# Patient Record
Sex: MALE | Race: WHITE | HISPANIC OR LATINO | Employment: OTHER | URBAN - METROPOLITAN AREA
[De-identification: names, ages, dates, MRNs, and addresses within clinical notes are randomized per-mention and may not be internally consistent; named-entity substitution may affect disease eponyms.]

---

## 2023-02-23 LAB
ANION GAP IN SER/PLAS: 12 MMOL/L (ref 10–20)
CALCIUM (MG/DL) IN SER/PLAS: 9.6 MG/DL (ref 8.6–10.3)
CARBON DIOXIDE, TOTAL (MMOL/L) IN SER/PLAS: 26 MMOL/L (ref 21–32)
CHLORIDE (MMOL/L) IN SER/PLAS: 105 MMOL/L (ref 98–107)
CREATININE (MG/DL) IN SER/PLAS: 1.44 MG/DL (ref 0.5–1.3)
GFR MALE: 47 ML/MIN/1.73M2
GLUCOSE (MG/DL) IN SER/PLAS: 86 MG/DL (ref 74–99)
INR IN PPP BY COAGULATION ASSAY: 1.9 (ref 0.9–1.1)
PARATHYRIN INTACT (PG/ML) IN SER/PLAS: 106 PG/ML (ref 18.5–88)
PHOSPHATE (MG/DL) IN SER/PLAS: 3 MG/DL (ref 2.5–4.9)
POTASSIUM (MMOL/L) IN SER/PLAS: 4.3 MMOL/L (ref 3.5–5.3)
PROTHROMBIN TIME (PT) IN PPP BY COAGULATION ASSAY: 22.1 SEC (ref 9.8–13.4)
SODIUM (MMOL/L) IN SER/PLAS: 139 MMOL/L (ref 136–145)
URATE (MG/DL) IN SER/PLAS: 4.9 MG/DL (ref 4–7.5)
UREA NITROGEN (MG/DL) IN SER/PLAS: 22 MG/DL (ref 6–23)

## 2023-03-28 LAB
INR IN PPP BY COAGULATION ASSAY: 1.7 (ref 0.9–1.1)
PROTHROMBIN TIME (PT) IN PPP BY COAGULATION ASSAY: 19.8 SEC (ref 9.8–13.4)

## 2023-04-26 LAB
INR IN PPP BY COAGULATION ASSAY: 2.2 (ref 0.9–1.1)
PROTHROMBIN TIME (PT) IN PPP BY COAGULATION ASSAY: 25.3 SEC (ref 9.8–13.4)

## 2023-05-25 LAB
INR IN PPP BY COAGULATION ASSAY: 2.3 (ref 0.9–1.1)
PROTHROMBIN TIME (PT) IN PPP BY COAGULATION ASSAY: 27.3 SEC (ref 9.8–13.4)

## 2023-06-21 LAB
INR IN PPP BY COAGULATION ASSAY: 1.9 (ref 0.9–1.1)
PROTHROMBIN TIME (PT) IN PPP BY COAGULATION ASSAY: 21.5 SEC (ref 9.8–12.8)

## 2023-07-19 LAB
INR IN PPP BY COAGULATION ASSAY: 2.3 (ref 0.9–1.1)
PROTHROMBIN TIME (PT) IN PPP BY COAGULATION ASSAY: 25.8 SEC (ref 9.8–12.8)

## 2023-08-16 LAB
ANION GAP IN SER/PLAS: 11 MMOL/L (ref 10–20)
CALCIUM (MG/DL) IN SER/PLAS: 9.4 MG/DL (ref 8.6–10.3)
CARBON DIOXIDE, TOTAL (MMOL/L) IN SER/PLAS: 27 MMOL/L (ref 21–32)
CHLORIDE (MMOL/L) IN SER/PLAS: 103 MMOL/L (ref 98–107)
CREATININE (MG/DL) IN SER/PLAS: 1.59 MG/DL (ref 0.5–1.3)
GFR MALE: 41 ML/MIN/1.73M2
GLUCOSE (MG/DL) IN SER/PLAS: 105 MG/DL (ref 74–99)
INR IN PPP BY COAGULATION ASSAY: 3.1 (ref 0.9–1.1)
PARATHYRIN INTACT (PG/ML) IN SER/PLAS: 70.7 PG/ML (ref 18.5–88)
PHOSPHATE (MG/DL) IN SER/PLAS: 3.2 MG/DL (ref 2.5–4.9)
POTASSIUM (MMOL/L) IN SER/PLAS: 4.4 MMOL/L (ref 3.5–5.3)
PROTHROMBIN TIME (PT) IN PPP BY COAGULATION ASSAY: 34.9 SEC (ref 9.8–12.8)
SODIUM (MMOL/L) IN SER/PLAS: 137 MMOL/L (ref 136–145)
UREA NITROGEN (MG/DL) IN SER/PLAS: 23 MG/DL (ref 6–23)

## 2023-09-13 LAB
INR IN PPP BY COAGULATION ASSAY: 1.8 (ref 0.9–1.1)
PROTHROMBIN TIME (PT) IN PPP BY COAGULATION ASSAY: 19.9 SEC (ref 9.8–12.8)

## 2023-10-11 ENCOUNTER — LAB (OUTPATIENT)
Dept: LAB | Facility: LAB | Age: 88
End: 2023-10-11
Payer: MEDICARE

## 2023-10-11 DIAGNOSIS — Z79.01 LONG TERM (CURRENT) USE OF ANTICOAGULANTS: Primary | ICD-10-CM

## 2023-10-11 LAB
INR PPP: 2.8 (ref 0.9–1.1)
PROTHROMBIN TIME: 31.7 SECONDS (ref 9.8–12.8)

## 2023-10-11 PROCEDURE — 85610 PROTHROMBIN TIME: CPT

## 2023-10-11 PROCEDURE — 36415 COLL VENOUS BLD VENIPUNCTURE: CPT

## 2023-11-08 ENCOUNTER — LAB (OUTPATIENT)
Dept: LAB | Facility: LAB | Age: 88
End: 2023-11-08
Payer: MEDICARE

## 2023-11-08 DIAGNOSIS — Z79.01 LONG TERM (CURRENT) USE OF ANTICOAGULANTS: Primary | ICD-10-CM

## 2023-11-08 LAB
INR PPP: 2.9 (ref 0.9–1.1)
PROTHROMBIN TIME: 33.4 SECONDS (ref 9.8–12.8)

## 2023-11-08 PROCEDURE — 85610 PROTHROMBIN TIME: CPT

## 2023-11-08 PROCEDURE — 36415 COLL VENOUS BLD VENIPUNCTURE: CPT

## 2023-11-22 DIAGNOSIS — N18.9 CHRONIC KIDNEY DISEASE, UNSPECIFIED CKD STAGE: ICD-10-CM

## 2023-11-28 RX ORDER — ALLOPURINOL 100 MG/1
100 TABLET ORAL DAILY
Qty: 90 TABLET | Refills: 2 | Status: SHIPPED | OUTPATIENT
Start: 2023-11-28

## 2023-12-06 ENCOUNTER — LAB (OUTPATIENT)
Dept: LAB | Facility: LAB | Age: 88
End: 2023-12-06
Payer: MEDICARE

## 2023-12-06 DIAGNOSIS — Z79.01 LONG TERM (CURRENT) USE OF ANTICOAGULANTS: Primary | ICD-10-CM

## 2023-12-06 LAB
INR PPP: 3.4 (ref 0.9–1.1)
PROTHROMBIN TIME: 39.4 SECONDS (ref 9.8–12.8)

## 2023-12-06 PROCEDURE — 36415 COLL VENOUS BLD VENIPUNCTURE: CPT

## 2023-12-06 PROCEDURE — 85610 PROTHROMBIN TIME: CPT

## 2023-12-11 ENCOUNTER — APPOINTMENT (OUTPATIENT)
Dept: PRIMARY CARE | Facility: CLINIC | Age: 88
End: 2023-12-11
Payer: MEDICARE

## 2023-12-28 ENCOUNTER — LAB (OUTPATIENT)
Dept: LAB | Facility: LAB | Age: 88
End: 2023-12-28
Payer: MEDICARE

## 2023-12-28 DIAGNOSIS — Z79.01 LONG TERM (CURRENT) USE OF ANTICOAGULANTS: Primary | ICD-10-CM

## 2023-12-28 LAB
INR PPP: 2.6 (ref 0.9–1.1)
PROTHROMBIN TIME: 29.1 SECONDS (ref 9.8–12.8)

## 2023-12-28 PROCEDURE — 36415 COLL VENOUS BLD VENIPUNCTURE: CPT

## 2023-12-28 PROCEDURE — 85610 PROTHROMBIN TIME: CPT

## 2024-01-25 ENCOUNTER — LAB (OUTPATIENT)
Dept: LAB | Facility: LAB | Age: 89
End: 2024-01-25
Payer: MEDICARE

## 2024-01-25 DIAGNOSIS — Z79.01 LONG TERM (CURRENT) USE OF ANTICOAGULANTS: Primary | ICD-10-CM

## 2024-01-25 LAB
INR PPP: 3.2 (ref 0.9–1.1)
PROTHROMBIN TIME: 36.1 SECONDS (ref 9.8–12.8)

## 2024-01-25 PROCEDURE — 36415 COLL VENOUS BLD VENIPUNCTURE: CPT

## 2024-01-25 PROCEDURE — 85610 PROTHROMBIN TIME: CPT

## 2024-02-22 ENCOUNTER — LAB (OUTPATIENT)
Dept: LAB | Facility: LAB | Age: 89
End: 2024-02-22
Payer: MEDICARE

## 2024-02-22 DIAGNOSIS — Z79.01 LONG TERM (CURRENT) USE OF ANTICOAGULANTS: Primary | ICD-10-CM

## 2024-02-22 DIAGNOSIS — I10 ESSENTIAL (PRIMARY) HYPERTENSION: ICD-10-CM

## 2024-02-22 DIAGNOSIS — N25.81 SECONDARY HYPERPARATHYROIDISM OF RENAL ORIGIN (MULTI): ICD-10-CM

## 2024-02-22 LAB
ANION GAP SERPL CALC-SCNC: 11 MMOL/L (ref 10–20)
BUN SERPL-MCNC: 24 MG/DL (ref 6–23)
CALCIUM SERPL-MCNC: 9.8 MG/DL (ref 8.6–10.3)
CHLORIDE SERPL-SCNC: 104 MMOL/L (ref 98–107)
CO2 SERPL-SCNC: 29 MMOL/L (ref 21–32)
CREAT SERPL-MCNC: 1.6 MG/DL (ref 0.5–1.3)
EGFRCR SERPLBLD CKD-EPI 2021: 41 ML/MIN/1.73M*2
GLUCOSE SERPL-MCNC: 89 MG/DL (ref 74–99)
INR PPP: 2.6 (ref 0.9–1.1)
POTASSIUM SERPL-SCNC: 4 MMOL/L (ref 3.5–5.3)
PROTHROMBIN TIME: 29.5 SECONDS (ref 9.8–12.8)
PTH-INTACT SERPL-MCNC: 51.3 PG/ML (ref 18.5–88)
SODIUM SERPL-SCNC: 140 MMOL/L (ref 136–145)

## 2024-02-22 PROCEDURE — 36415 COLL VENOUS BLD VENIPUNCTURE: CPT

## 2024-02-22 PROCEDURE — 85610 PROTHROMBIN TIME: CPT

## 2024-02-22 PROCEDURE — 83970 ASSAY OF PARATHORMONE: CPT

## 2024-02-22 PROCEDURE — 80048 BASIC METABOLIC PNL TOTAL CA: CPT

## 2024-02-26 ENCOUNTER — OFFICE VISIT (OUTPATIENT)
Dept: PRIMARY CARE | Facility: CLINIC | Age: 89
End: 2024-02-26
Payer: MEDICARE

## 2024-02-26 VITALS
DIASTOLIC BLOOD PRESSURE: 70 MMHG | HEIGHT: 70 IN | OXYGEN SATURATION: 96 % | BODY MASS INDEX: 21.33 KG/M2 | TEMPERATURE: 98 F | HEART RATE: 77 BPM | WEIGHT: 149 LBS | SYSTOLIC BLOOD PRESSURE: 110 MMHG

## 2024-02-26 DIAGNOSIS — E44.0 MODERATE PROTEIN-CALORIE MALNUTRITION (MULTI): ICD-10-CM

## 2024-02-26 DIAGNOSIS — N18.32 STAGE 3B CHRONIC KIDNEY DISEASE (MULTI): ICD-10-CM

## 2024-02-26 DIAGNOSIS — F03.90 DEMENTIA WITHOUT BEHAVIORAL DISTURBANCE (MULTI): ICD-10-CM

## 2024-02-26 DIAGNOSIS — Z00.00 MEDICARE ANNUAL WELLNESS VISIT, SUBSEQUENT: Primary | ICD-10-CM

## 2024-02-26 DIAGNOSIS — M1A.0790 CHRONIC IDIOPATHIC GOUT INVOLVING TOE WITHOUT TOPHUS, UNSPECIFIED LATERALITY: ICD-10-CM

## 2024-02-26 DIAGNOSIS — N25.81 SECONDARY HYPERPARATHYROIDISM OF RENAL ORIGIN (MULTI): ICD-10-CM

## 2024-02-26 PROCEDURE — 99497 ADVNCD CARE PLAN 30 MIN: CPT | Performed by: FAMILY MEDICINE

## 2024-02-26 PROCEDURE — 1036F TOBACCO NON-USER: CPT | Performed by: FAMILY MEDICINE

## 2024-02-26 PROCEDURE — G0439 PPPS, SUBSEQ VISIT: HCPCS | Performed by: FAMILY MEDICINE

## 2024-02-26 PROCEDURE — 1159F MED LIST DOCD IN RCRD: CPT | Performed by: FAMILY MEDICINE

## 2024-02-26 RX ORDER — CALCITRIOL 0.25 UG/1
0.25 CAPSULE ORAL
COMMUNITY
Start: 2017-11-04

## 2024-02-26 RX ORDER — METOPROLOL SUCCINATE 25 MG/1
TABLET, EXTENDED RELEASE ORAL
COMMUNITY

## 2024-02-26 RX ORDER — DONEPEZIL HYDROCHLORIDE 10 MG/1
10 TABLET, FILM COATED ORAL NIGHTLY
COMMUNITY
Start: 2020-09-18

## 2024-02-26 RX ORDER — MEMANTINE HYDROCHLORIDE 5 MG/1
5 TABLET ORAL 2 TIMES DAILY
COMMUNITY
End: 2024-02-26 | Stop reason: WASHOUT

## 2024-02-26 RX ORDER — LISINOPRIL 10 MG/1
TABLET ORAL
COMMUNITY
End: 2024-02-26 | Stop reason: WASHOUT

## 2024-02-26 RX ORDER — WARFARIN SODIUM 5 MG/1
5 TABLET ORAL
COMMUNITY
Start: 2024-01-31

## 2024-02-26 ASSESSMENT — PATIENT HEALTH QUESTIONNAIRE - PHQ9
2. FEELING DOWN, DEPRESSED OR HOPELESS: NOT AT ALL
SUM OF ALL RESPONSES TO PHQ9 QUESTIONS 1 AND 2: 0
1. LITTLE INTEREST OR PLEASURE IN DOING THINGS: NOT AT ALL

## 2024-02-26 NOTE — PROGRESS NOTES
Advance Care Planning Note     Discussion Date: 02/26/24   Discussion Participants: patient    The patient wishes to discuss Advance Care Planning today and the following is a brief summary of our discussion.     Patient has capacity to make their own medical decisions: Yes  Health Care Agent/Surrogate Decision Maker documented in chart: Yes    Documents on file and valid:  Advance Directive/Living Will: Yes   Health Care Power of : Yes  Other: [email or web address]    Communication of Medical Status/Prognosis:   na    Communication of Treatment Goals/Options:   yes     Treatment Decisions  Goals of Care: survival is paramount regardless of prognosis, treatment outcome, or burden   na  Follow Up Plan  na  Team Members  na  Time Statement: Total face to face time spent on advance care planning was >15 minutes with 16 minutes spent in counseling, including the explanation.    Marko Mcfarland, DO  2/26/2024 2:57 PM    Patient is here today with no issues problems or complaints is already had blood work and doing well from follow-up for his kidney specialist.  He has had a little bit of issues hearing    REVIEW OF SYSTEMS: 12 systems negative except for those mentioned in the HPI. Reviewed home risks with patient. Patient feels safe at home.    PHYSICAL EXAMINATION:   Constitutional: The patient is alert and oriented x3, in no acute  distress.  Eyes: Extraocular movements are intact. Pupils are equal and reactive to light  ENT: Bilateral ears are occluded with cerumen.  With patient's permission both are flushed clean patient is able to hear better with no complications. Nasal turbinates are within normal limits. Throat within normal limits.  Neck: Supple without lymphadenopathy or JVD.  Heart: Regular rate and rhythm without murmur, click, gallop, or rub.  Lungs: Clear to auscultation bilaterally. No rales, rhonchi, or  wheezing.  Abdomen: Soft, nontender, nondistended. Normoactive bowel sounds.   No palpable masses. Normal  percussion  Musculoskeletal: 5/5 motor, upper and lower extremities.  Neurologic: Cranial nerves II through XII fully intact. +2/4 DTRs  in ankle and knee.  Psychiatric: Good judgment and insight. Normal affect and mood. No cognitive defects noted.  Lymphatic: Negative as noted above.  Skin: no rashes or lesions    Assessment:  Per EMR    Plan:  Patient is doing excellent.  No issues problems or complaints.  Labs are reviewed otherwise medications to be refilled we will follow-up 6 months  Discussed with the patient and reviewed annual wellness questionnaire form as well as cognitive deficits. Also discussed with the patient immunizations and risks for colonoscopy and other screening procedures    This dictation was created using Dragon dictation and may contain errors

## 2024-03-12 ENCOUNTER — OFFICE VISIT (OUTPATIENT)
Dept: NEPHROLOGY | Facility: CLINIC | Age: 89
End: 2024-03-12
Payer: MEDICARE

## 2024-03-12 VITALS
WEIGHT: 145 LBS | HEART RATE: 74 BPM | DIASTOLIC BLOOD PRESSURE: 69 MMHG | BODY MASS INDEX: 20.76 KG/M2 | SYSTOLIC BLOOD PRESSURE: 103 MMHG | HEIGHT: 70 IN

## 2024-03-12 DIAGNOSIS — I10 ESSENTIAL HYPERTENSION: ICD-10-CM

## 2024-03-12 DIAGNOSIS — N18.31 STAGE 3A CHRONIC KIDNEY DISEASE (MULTI): Primary | ICD-10-CM

## 2024-03-12 PROCEDURE — 1159F MED LIST DOCD IN RCRD: CPT | Performed by: INTERNAL MEDICINE

## 2024-03-12 PROCEDURE — 3074F SYST BP LT 130 MM HG: CPT | Performed by: INTERNAL MEDICINE

## 2024-03-12 PROCEDURE — 1036F TOBACCO NON-USER: CPT | Performed by: INTERNAL MEDICINE

## 2024-03-12 PROCEDURE — 3078F DIAST BP <80 MM HG: CPT | Performed by: INTERNAL MEDICINE

## 2024-03-12 PROCEDURE — 99213 OFFICE O/P EST LOW 20 MIN: CPT | Performed by: INTERNAL MEDICINE

## 2024-03-12 NOTE — PROGRESS NOTES
Slim Saldivar   89 y.o.    @@  [facility]/Room: [id number]/Room/bed info not found    Subjective:   The patient is being seen for a routine clinic follow-up of chronic kidney disease. Recently, the disease has been stable. Disease complications:  No hyperkalemia, no hypocalcemia, no hyperphosphatemia, no metabolic acidosis, no coagulopathy, no uremic encephalopathy, no neuropathy and no renal osteodystrophy. The patient is currently asymptomatic. No associated symptoms are reported.       Meds:   Current Outpatient Medications   Medication Sig Dispense Refill    allopurinol (Zyloprim) 100 mg tablet TAKE 1 TABLET DAILY 90 tablet 2    calcitriol (Rocaltrol) 0.25 mcg capsule Take 1 capsule (0.25 mcg) by mouth. 2 Sunday and Saturday, 1 tab rest of week      donepezil (Aricept) 10 mg tablet Take 1 tablet (10 mg) by mouth once daily at bedtime.      metoprolol succinate XL (Toprol-XL) 25 mg 24 hr tablet Take by mouth once daily.      warfarin (Coumadin) 5 mg tablet Take 1 tablet (5 mg) by mouth once daily.       No current facility-administered medications for this visit.          ROS:  The patient is awake and oriented. No dizziness or lightheadedness. No chills and no fever. No headaches. No nausea and no vomiting. No shortness of breath. No cough. No sputum. No chest pain. No chest tightness. No abdominal pain. No diarrhea and no constipation. No hematemesis or hemoptysis. No hematuria. No rectal bleeding. No melena. No epistaxis. No urinary symptoms. No flank pain. No leg edema. No leg pain. No weakness. No itching. Overall, the rest of the review of systems is also negative.  12 point review of systems otherwise negative as stated in HPI.        Physical Examination:        Vitals:    03/12/24 1409   BP: 103/69   Pulse: 74     General: The patient is awake, oriented, and is not in any distress.  Head and Neck: Normocephalic. No periorbital edema.  Eyes: non-icteric  Respiratory: Symmetric air entry. Symmetric chest  expansion.No respiratory distress.  Cardiovascular: Symmetric peripheral pulses.  Skin: No maculopapular rash.  Abdomen: soft, nt/nd  Musculoskeletal: No peripheral edema in both left and right upper extremities.  No edema in either left or right lower extremities.  Neuro Exam: Speech is fluent. Moves extremities.    Imaging:  === 08/21/20 ===    US RENAL COMPLETE    - Impression -  Bilateral simple renal cysts which appear unchanged compared to March 2020 when accounting for differences in imaging technique.    Cortical thinning is present bilaterally.    I personally reviewed the images/study and I agree with the findings  as stated. This study was interpreted at [place], [place].       Blood Labs:  No results found for this or any previous visit (from the past 24 hour(s)).   Lab Results   Component Value Date    PTH 51.3 02/22/2024    PHOS 3.2 08/16/2023      Lab Results   Component Value Date    GLUCOSE 89 02/22/2024    CALCIUM 9.8 02/22/2024     02/22/2024    K 4.0 02/22/2024    CO2 29 02/22/2024     02/22/2024    BUN 24 (H) 02/22/2024    CREATININE 1.60 (H) 02/22/2024         Assessment and Plan:  1. Chronic kidney disease, stage III. Last Cr level is 1.6. Stable kidney function. His volume status is good. Normal potassium and bicarb level.     2. Secondary hyperparathyroidism. On calcitriol with good PTH level.     3. Dyslipidemia. He is on a statin and he is tolerating a statin     4. 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     5. Hypertension.      He will be seen in my office about 6 months for followup.           Landen Capps MD

## 2024-03-21 ENCOUNTER — LAB (OUTPATIENT)
Dept: LAB | Facility: LAB | Age: 89
End: 2024-03-21
Payer: MEDICARE

## 2024-03-21 DIAGNOSIS — Z79.01 LONG TERM (CURRENT) USE OF ANTICOAGULANTS: Primary | ICD-10-CM

## 2024-03-21 LAB
INR PPP: 2.3 (ref 0.9–1.1)
PROTHROMBIN TIME: 26.6 SECONDS (ref 9.8–12.8)

## 2024-03-21 PROCEDURE — 85610 PROTHROMBIN TIME: CPT

## 2024-03-21 PROCEDURE — 36415 COLL VENOUS BLD VENIPUNCTURE: CPT

## 2024-04-18 ENCOUNTER — LAB (OUTPATIENT)
Dept: LAB | Facility: LAB | Age: 89
End: 2024-04-18
Payer: MEDICARE

## 2024-04-18 DIAGNOSIS — Z79.01 LONG TERM (CURRENT) USE OF ANTICOAGULANTS: ICD-10-CM

## 2024-04-18 DIAGNOSIS — I48.11 LONGSTANDING PERSISTENT ATRIAL FIBRILLATION (MULTI): ICD-10-CM

## 2024-04-18 DIAGNOSIS — I48.19 OTHER PERSISTENT ATRIAL FIBRILLATION (MULTI): Primary | ICD-10-CM

## 2024-04-18 LAB
INR PPP: 1.2 (ref 0.9–1.1)
PROTHROMBIN TIME: 13.4 SECONDS (ref 9.8–12.8)

## 2024-04-18 PROCEDURE — 36415 COLL VENOUS BLD VENIPUNCTURE: CPT

## 2024-04-18 PROCEDURE — 85610 PROTHROMBIN TIME: CPT

## 2024-05-02 ENCOUNTER — LAB (OUTPATIENT)
Dept: LAB | Facility: LAB | Age: 89
End: 2024-05-02
Payer: MEDICARE

## 2024-05-02 DIAGNOSIS — I48.11 LONGSTANDING PERSISTENT ATRIAL FIBRILLATION (MULTI): ICD-10-CM

## 2024-05-02 DIAGNOSIS — I48.19 OTHER PERSISTENT ATRIAL FIBRILLATION (MULTI): ICD-10-CM

## 2024-05-02 DIAGNOSIS — Z79.01 LONG TERM (CURRENT) USE OF ANTICOAGULANTS: ICD-10-CM

## 2024-05-02 LAB
INR PPP: 1.4 (ref 0.9–1.1)
PROTHROMBIN TIME: 15.5 SECONDS (ref 9.8–12.8)

## 2024-05-02 PROCEDURE — 36415 COLL VENOUS BLD VENIPUNCTURE: CPT

## 2024-05-02 PROCEDURE — 85610 PROTHROMBIN TIME: CPT

## 2024-05-20 ENCOUNTER — LAB (OUTPATIENT)
Dept: LAB | Facility: LAB | Age: 89
End: 2024-05-20
Payer: MEDICARE

## 2024-05-20 DIAGNOSIS — Z79.01 LONG TERM (CURRENT) USE OF ANTICOAGULANTS: ICD-10-CM

## 2024-05-20 DIAGNOSIS — I48.11 LONGSTANDING PERSISTENT ATRIAL FIBRILLATION (MULTI): ICD-10-CM

## 2024-05-20 DIAGNOSIS — I48.19 OTHER PERSISTENT ATRIAL FIBRILLATION (MULTI): ICD-10-CM

## 2024-05-20 LAB
INR PPP: 1.8 (ref 0.9–1.1)
PROTHROMBIN TIME: 20.4 SECONDS (ref 9.8–12.8)

## 2024-05-20 PROCEDURE — 85610 PROTHROMBIN TIME: CPT

## 2024-05-20 PROCEDURE — 36415 COLL VENOUS BLD VENIPUNCTURE: CPT

## 2024-05-30 ENCOUNTER — LAB (OUTPATIENT)
Dept: LAB | Facility: LAB | Age: 89
End: 2024-05-30
Payer: MEDICARE

## 2024-05-30 DIAGNOSIS — Z79.01 LONG TERM (CURRENT) USE OF ANTICOAGULANTS: ICD-10-CM

## 2024-05-30 DIAGNOSIS — I48.19 OTHER PERSISTENT ATRIAL FIBRILLATION (MULTI): ICD-10-CM

## 2024-05-30 DIAGNOSIS — I48.11 LONGSTANDING PERSISTENT ATRIAL FIBRILLATION (MULTI): ICD-10-CM

## 2024-05-30 LAB
INR PPP: 1.5 (ref 0.9–1.1)
PROTHROMBIN TIME: 17.4 SECONDS (ref 9.8–12.8)

## 2024-05-30 PROCEDURE — 36415 COLL VENOUS BLD VENIPUNCTURE: CPT

## 2024-05-30 PROCEDURE — 85610 PROTHROMBIN TIME: CPT

## 2024-06-13 ENCOUNTER — LAB (OUTPATIENT)
Dept: LAB | Facility: LAB | Age: 89
End: 2024-06-13
Payer: MEDICARE

## 2024-06-13 DIAGNOSIS — I48.19 OTHER PERSISTENT ATRIAL FIBRILLATION (MULTI): ICD-10-CM

## 2024-06-13 DIAGNOSIS — Z79.01 LONG TERM (CURRENT) USE OF ANTICOAGULANTS: ICD-10-CM

## 2024-06-13 DIAGNOSIS — I48.11 LONGSTANDING PERSISTENT ATRIAL FIBRILLATION (MULTI): ICD-10-CM

## 2024-06-13 LAB
INR PPP: 1.5 (ref 0.9–1.1)
PROTHROMBIN TIME: 16.4 SECONDS (ref 9.8–12.8)

## 2024-06-13 PROCEDURE — 36415 COLL VENOUS BLD VENIPUNCTURE: CPT

## 2024-06-13 PROCEDURE — 85610 PROTHROMBIN TIME: CPT

## 2024-07-06 ENCOUNTER — APPOINTMENT (OUTPATIENT)
Dept: CARDIOLOGY | Facility: HOSPITAL | Age: 89
End: 2024-07-06
Payer: MEDICARE

## 2024-07-06 ENCOUNTER — HOSPITAL ENCOUNTER (OUTPATIENT)
Facility: HOSPITAL | Age: 89
Setting detail: OBSERVATION
End: 2024-07-06
Attending: EMERGENCY MEDICINE | Admitting: INTERNAL MEDICINE
Payer: MEDICARE

## 2024-07-06 ENCOUNTER — APPOINTMENT (OUTPATIENT)
Dept: RADIOLOGY | Facility: HOSPITAL | Age: 89
End: 2024-07-06
Payer: MEDICARE

## 2024-07-06 DIAGNOSIS — F03.911 AGITATION DUE TO DEMENTIA (MULTI): Primary | ICD-10-CM

## 2024-07-06 DIAGNOSIS — R79.1 ELEVATED INR: ICD-10-CM

## 2024-07-06 PROBLEM — F03.918 DEMENTIA WITH AGGRESSIVE BEHAVIOR (MULTI): Status: ACTIVE | Noted: 2024-02-26

## 2024-07-06 PROBLEM — R41.82 AMS (ALTERED MENTAL STATUS): Status: ACTIVE | Noted: 2024-07-06

## 2024-07-06 LAB
ALBUMIN SERPL BCP-MCNC: 3.7 G/DL (ref 3.4–5)
ALP SERPL-CCNC: 75 U/L (ref 33–136)
ALT SERPL W P-5'-P-CCNC: 9 U/L (ref 10–52)
AMPHETAMINES UR QL SCN: NORMAL
ANION GAP SERPL CALC-SCNC: 13 MMOL/L (ref 10–20)
APAP SERPL-MCNC: <10 UG/ML
APPEARANCE UR: CLEAR
AST SERPL W P-5'-P-CCNC: 15 U/L (ref 9–39)
BARBITURATES UR QL SCN: NORMAL
BASOPHILS # BLD AUTO: 0.06 X10*3/UL (ref 0–0.1)
BASOPHILS NFR BLD AUTO: 1.1 %
BENZODIAZ UR QL SCN: NORMAL
BILIRUB SERPL-MCNC: 1.1 MG/DL (ref 0–1.2)
BILIRUB UR STRIP.AUTO-MCNC: NEGATIVE MG/DL
BUN SERPL-MCNC: 21 MG/DL (ref 6–23)
BZE UR QL SCN: NORMAL
CALCIUM SERPL-MCNC: 9.3 MG/DL (ref 8.6–10.3)
CANNABINOIDS UR QL SCN: NORMAL
CARDIAC TROPONIN I PNL SERPL HS: 14 NG/L (ref 0–20)
CARDIAC TROPONIN I PNL SERPL HS: 16 NG/L (ref 0–20)
CHLORIDE SERPL-SCNC: 104 MMOL/L (ref 98–107)
CK SERPL-CCNC: 52 U/L (ref 0–325)
CO2 SERPL-SCNC: 25 MMOL/L (ref 21–32)
COLOR UR: ABNORMAL
CREAT SERPL-MCNC: 1.44 MG/DL (ref 0.5–1.3)
EGFRCR SERPLBLD CKD-EPI 2021: 46 ML/MIN/1.73M*2
EOSINOPHIL # BLD AUTO: 0.14 X10*3/UL (ref 0–0.4)
EOSINOPHIL NFR BLD AUTO: 2.6 %
ERYTHROCYTE [DISTWIDTH] IN BLOOD BY AUTOMATED COUNT: 13.2 % (ref 11.5–14.5)
ETHANOL SERPL-MCNC: <10 MG/DL
FENTANYL+NORFENTANYL UR QL SCN: NORMAL
GLUCOSE SERPL-MCNC: 91 MG/DL (ref 74–99)
GLUCOSE UR STRIP.AUTO-MCNC: NORMAL MG/DL
HCT VFR BLD AUTO: 43 % (ref 41–52)
HGB BLD-MCNC: 14.7 G/DL (ref 13.5–17.5)
IMM GRANULOCYTES # BLD AUTO: 0.01 X10*3/UL (ref 0–0.5)
IMM GRANULOCYTES NFR BLD AUTO: 0.2 % (ref 0–0.9)
INR PPP: 5.5 (ref 0.9–1.1)
KETONES UR STRIP.AUTO-MCNC: NEGATIVE MG/DL
LEUKOCYTE ESTERASE UR QL STRIP.AUTO: NEGATIVE
LYMPHOCYTES # BLD AUTO: 1.46 X10*3/UL (ref 0.8–3)
LYMPHOCYTES NFR BLD AUTO: 26.9 %
MAGNESIUM SERPL-MCNC: 1.68 MG/DL (ref 1.6–2.4)
MCH RBC QN AUTO: 30.9 PG (ref 26–34)
MCHC RBC AUTO-ENTMCNC: 34.2 G/DL (ref 32–36)
MCV RBC AUTO: 90 FL (ref 80–100)
METHADONE UR QL SCN: NORMAL
MONOCYTES # BLD AUTO: 0.47 X10*3/UL (ref 0.05–0.8)
MONOCYTES NFR BLD AUTO: 8.7 %
NEUTROPHILS # BLD AUTO: 3.29 X10*3/UL (ref 1.6–5.5)
NEUTROPHILS NFR BLD AUTO: 60.5 %
NITRITE UR QL STRIP.AUTO: NEGATIVE
NRBC BLD-RTO: 0 /100 WBCS (ref 0–0)
OPIATES UR QL SCN: NORMAL
OXYCODONE+OXYMORPHONE UR QL SCN: NORMAL
PCP UR QL SCN: NORMAL
PH UR STRIP.AUTO: 5.5 [PH]
PLATELET # BLD AUTO: 184 X10*3/UL (ref 150–450)
POTASSIUM SERPL-SCNC: 3.8 MMOL/L (ref 3.5–5.3)
PROT SERPL-MCNC: 6.1 G/DL (ref 6.4–8.2)
PROT UR STRIP.AUTO-MCNC: NEGATIVE MG/DL
PROTHROMBIN TIME: 62.7 SECONDS (ref 9.8–12.8)
RBC # BLD AUTO: 4.76 X10*6/UL (ref 4.5–5.9)
RBC # UR STRIP.AUTO: ABNORMAL /UL
RBC #/AREA URNS AUTO: >20 /HPF
SALICYLATES SERPL-MCNC: <3 MG/DL
SODIUM SERPL-SCNC: 138 MMOL/L (ref 136–145)
SP GR UR STRIP.AUTO: 1.01
UROBILINOGEN UR STRIP.AUTO-MCNC: NORMAL MG/DL
WBC # BLD AUTO: 5.4 X10*3/UL (ref 4.4–11.3)
WBC #/AREA URNS AUTO: ABNORMAL /HPF

## 2024-07-06 PROCEDURE — 99285 EMERGENCY DEPT VISIT HI MDM: CPT | Performed by: EMERGENCY MEDICINE

## 2024-07-06 PROCEDURE — 81001 URINALYSIS AUTO W/SCOPE: CPT

## 2024-07-06 PROCEDURE — 85025 COMPLETE CBC W/AUTO DIFF WBC: CPT

## 2024-07-06 PROCEDURE — 71045 X-RAY EXAM CHEST 1 VIEW: CPT

## 2024-07-06 PROCEDURE — 70450 CT HEAD/BRAIN W/O DYE: CPT

## 2024-07-06 PROCEDURE — 2500000004 HC RX 250 GENERAL PHARMACY W/ HCPCS (ALT 636 FOR OP/ED): Performed by: EMERGENCY MEDICINE

## 2024-07-06 PROCEDURE — G0378 HOSPITAL OBSERVATION PER HR: HCPCS

## 2024-07-06 PROCEDURE — 99223 1ST HOSP IP/OBS HIGH 75: CPT | Performed by: INTERNAL MEDICINE

## 2024-07-06 PROCEDURE — 96360 HYDRATION IV INFUSION INIT: CPT

## 2024-07-06 PROCEDURE — 99285 EMERGENCY DEPT VISIT HI MDM: CPT | Mod: 25

## 2024-07-06 PROCEDURE — 93005 ELECTROCARDIOGRAM TRACING: CPT

## 2024-07-06 PROCEDURE — 85610 PROTHROMBIN TIME: CPT

## 2024-07-06 PROCEDURE — 80053 COMPREHEN METABOLIC PANEL: CPT

## 2024-07-06 PROCEDURE — 84484 ASSAY OF TROPONIN QUANT: CPT

## 2024-07-06 PROCEDURE — 36415 COLL VENOUS BLD VENIPUNCTURE: CPT

## 2024-07-06 PROCEDURE — 82550 ASSAY OF CK (CPK): CPT

## 2024-07-06 PROCEDURE — 80307 DRUG TEST PRSMV CHEM ANLYZR: CPT

## 2024-07-06 PROCEDURE — 2500000001 HC RX 250 WO HCPCS SELF ADMINISTERED DRUGS (ALT 637 FOR MEDICARE OP): Performed by: INTERNAL MEDICINE

## 2024-07-06 PROCEDURE — 83735 ASSAY OF MAGNESIUM: CPT

## 2024-07-06 PROCEDURE — 80143 DRUG ASSAY ACETAMINOPHEN: CPT

## 2024-07-06 PROCEDURE — 71045 X-RAY EXAM CHEST 1 VIEW: CPT | Performed by: RADIOLOGY

## 2024-07-06 PROCEDURE — 70450 CT HEAD/BRAIN W/O DYE: CPT | Performed by: RADIOLOGY

## 2024-07-06 RX ORDER — ONDANSETRON 4 MG/1
4 TABLET, ORALLY DISINTEGRATING ORAL EVERY 8 HOURS PRN
Status: DISCONTINUED | OUTPATIENT
Start: 2024-07-06 | End: 2024-07-09 | Stop reason: HOSPADM

## 2024-07-06 RX ORDER — ACETAMINOPHEN 650 MG/1
650 SUPPOSITORY RECTAL EVERY 4 HOURS PRN
Status: DISCONTINUED | OUTPATIENT
Start: 2024-07-06 | End: 2024-07-09 | Stop reason: HOSPADM

## 2024-07-06 RX ORDER — METOCLOPRAMIDE HYDROCHLORIDE 5 MG/ML
5 INJECTION INTRAMUSCULAR; INTRAVENOUS EVERY 6 HOURS PRN
Status: DISCONTINUED | OUTPATIENT
Start: 2024-07-06 | End: 2024-07-07

## 2024-07-06 RX ORDER — DIVALPROEX SODIUM 125 MG/1
125 TABLET, DELAYED RELEASE ORAL ONCE
Status: COMPLETED | OUTPATIENT
Start: 2024-07-06 | End: 2024-07-06

## 2024-07-06 RX ORDER — ACETAMINOPHEN 160 MG/5ML
650 SOLUTION ORAL EVERY 4 HOURS PRN
Status: DISCONTINUED | OUTPATIENT
Start: 2024-07-06 | End: 2024-07-09 | Stop reason: HOSPADM

## 2024-07-06 RX ORDER — METOCLOPRAMIDE 10 MG/1
5 TABLET ORAL EVERY 6 HOURS PRN
Status: DISCONTINUED | OUTPATIENT
Start: 2024-07-06 | End: 2024-07-07

## 2024-07-06 RX ORDER — TALC
3 POWDER (GRAM) TOPICAL NIGHTLY PRN
Status: DISCONTINUED | OUTPATIENT
Start: 2024-07-06 | End: 2024-07-09 | Stop reason: HOSPADM

## 2024-07-06 RX ORDER — ACETAMINOPHEN 325 MG/1
650 TABLET ORAL EVERY 4 HOURS PRN
Status: DISCONTINUED | OUTPATIENT
Start: 2024-07-06 | End: 2024-07-09 | Stop reason: HOSPADM

## 2024-07-06 RX ORDER — PANTOPRAZOLE SODIUM 40 MG/10ML
40 INJECTION, POWDER, LYOPHILIZED, FOR SOLUTION INTRAVENOUS
Status: DISCONTINUED | OUTPATIENT
Start: 2024-07-07 | End: 2024-07-07

## 2024-07-06 RX ORDER — ONDANSETRON HYDROCHLORIDE 2 MG/ML
4 INJECTION, SOLUTION INTRAVENOUS EVERY 8 HOURS PRN
Status: DISCONTINUED | OUTPATIENT
Start: 2024-07-06 | End: 2024-07-09 | Stop reason: HOSPADM

## 2024-07-06 RX ORDER — PANTOPRAZOLE SODIUM 40 MG/1
40 TABLET, DELAYED RELEASE ORAL
Status: DISCONTINUED | OUTPATIENT
Start: 2024-07-07 | End: 2024-07-09 | Stop reason: HOSPADM

## 2024-07-06 RX ADMIN — DIVALPROEX SODIUM 125 MG: 125 TABLET, DELAYED RELEASE ORAL at 22:34

## 2024-07-06 RX ADMIN — Medication 3 MG: at 22:34

## 2024-07-06 RX ADMIN — SODIUM CHLORIDE, POTASSIUM CHLORIDE, SODIUM LACTATE AND CALCIUM CHLORIDE 1000 ML: 600; 310; 30; 20 INJECTION, SOLUTION INTRAVENOUS at 18:55

## 2024-07-06 SDOH — HEALTH STABILITY: MENTAL HEALTH: CONTENT: UNABLE TO ASSESS

## 2024-07-06 SDOH — SOCIAL STABILITY: SOCIAL NETWORK: PARENT/GUARDIAN/SIGNIFICANT OTHER INVOLVEMENT: NO INVOLVEMENT

## 2024-07-06 SDOH — HEALTH STABILITY: MENTAL HEALTH: HAVE YOU WISHED YOU WERE DEAD OR WISHED YOU COULD GO TO SLEEP AND NOT WAKE UP?: NO

## 2024-07-06 SDOH — HEALTH STABILITY: MENTAL HEALTH: SLEEP PATTERN: UNABLE TO ASSESS

## 2024-07-06 SDOH — SOCIAL STABILITY: SOCIAL INSECURITY: DO YOU FEEL UNSAFE GOING BACK TO THE PLACE WHERE YOU ARE LIVING?: UNABLE TO ASSESS

## 2024-07-06 SDOH — HEALTH STABILITY: MENTAL HEALTH: BEHAVIORS/MOOD: APPROPRIATE FOR SITUATION;FLAT AFFECT;COOPERATIVE

## 2024-07-06 SDOH — SOCIAL STABILITY: SOCIAL NETWORK: VISITOR BEHAVIORS: UNABLE TO ASSESS

## 2024-07-06 SDOH — HEALTH STABILITY: MENTAL HEALTH: SUICIDE ASSESSMENT: ADULT (C-SSRS)

## 2024-07-06 SDOH — SOCIAL STABILITY: SOCIAL INSECURITY: FAMILY BEHAVIORS: UNABLE TO ASSESS

## 2024-07-06 SDOH — SOCIAL STABILITY: SOCIAL INSECURITY: WERE YOU ABLE TO COMPLETE ALL THE BEHAVIORAL HEALTH SCREENINGS?: YES

## 2024-07-06 SDOH — HEALTH STABILITY: MENTAL HEALTH: NEEDS EXPRESSED: DENIES

## 2024-07-06 SDOH — SOCIAL STABILITY: SOCIAL INSECURITY: HAS ANYONE EVER THREATENED TO HURT YOUR FAMILY OR YOUR PETS?: UNABLE TO ASSESS

## 2024-07-06 SDOH — HEALTH STABILITY: MENTAL HEALTH: HALLUCINATION: UNABLE TO ASSESS

## 2024-07-06 SDOH — SOCIAL STABILITY: SOCIAL INSECURITY: DO YOU FEEL ANYONE HAS EXPLOITED OR TAKEN ADVANTAGE OF YOU FINANCIALLY OR OF YOUR PERSONAL PROPERTY?: UNABLE TO ASSESS

## 2024-07-06 SDOH — HEALTH STABILITY: MENTAL HEALTH: HAVE YOU EVER DONE ANYTHING, STARTED TO DO ANYTHING, OR PREPARED TO DO ANYTHING TO END YOUR LIFE?: NO

## 2024-07-06 SDOH — SOCIAL STABILITY: SOCIAL INSECURITY: DOES ANYONE TRY TO KEEP YOU FROM HAVING/CONTACTING OTHER FRIENDS OR DOING THINGS OUTSIDE YOUR HOME?: UNABLE TO ASSESS

## 2024-07-06 SDOH — SOCIAL STABILITY: SOCIAL INSECURITY: ARE THERE ANY APPARENT SIGNS OF INJURIES/BEHAVIORS THAT COULD BE RELATED TO ABUSE/NEGLECT?: UNABLE TO ASSESS

## 2024-07-06 SDOH — SOCIAL STABILITY: SOCIAL INSECURITY: HAVE YOU HAD THOUGHTS OF HARMING ANYONE ELSE?: YES

## 2024-07-06 SDOH — HEALTH STABILITY: MENTAL HEALTH: HAVE YOU ACTUALLY HAD ANY THOUGHTS OF KILLING YOURSELF?: NO

## 2024-07-06 SDOH — SOCIAL STABILITY: SOCIAL INSECURITY: ARE YOU OR HAVE YOU BEEN THREATENED OR ABUSED PHYSICALLY, EMOTIONALLY, OR SEXUALLY BY ANYONE?: UNABLE TO ASSESS

## 2024-07-06 SDOH — HEALTH STABILITY: MENTAL HEALTH

## 2024-07-06 SDOH — SOCIAL STABILITY: SOCIAL INSECURITY: ABUSE: ADULT

## 2024-07-06 ASSESSMENT — PATIENT HEALTH QUESTIONNAIRE - PHQ9
SUM OF ALL RESPONSES TO PHQ9 QUESTIONS 1 & 2: 0
2. FEELING DOWN, DEPRESSED OR HOPELESS: NOT AT ALL
1. LITTLE INTEREST OR PLEASURE IN DOING THINGS: NOT AT ALL

## 2024-07-06 ASSESSMENT — COGNITIVE AND FUNCTIONAL STATUS - GENERAL
MOBILITY SCORE: 19
TOILETING: A LITTLE
PATIENT BASELINE BEDBOUND: NO
MOVING TO AND FROM BED TO CHAIR: A LITTLE
WALKING IN HOSPITAL ROOM: A LITTLE
DAILY ACTIVITIY SCORE: 20
STANDING UP FROM CHAIR USING ARMS: A LITTLE
HELP NEEDED FOR BATHING: A LITTLE
PERSONAL GROOMING: A LITTLE
CLIMB 3 TO 5 STEPS WITH RAILING: A LOT
DRESSING REGULAR LOWER BODY CLOTHING: A LITTLE

## 2024-07-06 ASSESSMENT — ACTIVITIES OF DAILY LIVING (ADL)
TOILETING: INDEPENDENT
HEARING - RIGHT EAR: FUNCTIONAL
ADEQUATE_TO_COMPLETE_ADL: YES
WALKS IN HOME: INDEPENDENT
PATIENT'S MEMORY ADEQUATE TO SAFELY COMPLETE DAILY ACTIVITIES?: NO
DRESSING YOURSELF: NEEDS ASSISTANCE
FEEDING YOURSELF: NEEDS ASSISTANCE
JUDGMENT_ADEQUATE_SAFELY_COMPLETE_DAILY_ACTIVITIES: NO
GROOMING: NEEDS ASSISTANCE
BATHING: NEEDS ASSISTANCE
LACK_OF_TRANSPORTATION: NO
ASSISTIVE_DEVICE: EYEGLASSES
HEARING - LEFT EAR: FUNCTIONAL

## 2024-07-06 ASSESSMENT — LIFESTYLE VARIABLES
HOW OFTEN DO YOU HAVE A DRINK CONTAINING ALCOHOL: NEVER
SKIP TO QUESTIONS 9-10: 1
AUDIT-C TOTAL SCORE: 0
HOW MANY STANDARD DRINKS CONTAINING ALCOHOL DO YOU HAVE ON A TYPICAL DAY: PATIENT DOES NOT DRINK
AUDIT-C TOTAL SCORE: 0
HOW OFTEN DO YOU HAVE 6 OR MORE DRINKS ON ONE OCCASION: NEVER

## 2024-07-06 ASSESSMENT — COLUMBIA-SUICIDE SEVERITY RATING SCALE - C-SSRS
1. IN THE PAST MONTH, HAVE YOU WISHED YOU WERE DEAD OR WISHED YOU COULD GO TO SLEEP AND NOT WAKE UP?: NO
2. HAVE YOU ACTUALLY HAD ANY THOUGHTS OF KILLING YOURSELF?: NO
6. HAVE YOU EVER DONE ANYTHING, STARTED TO DO ANYTHING, OR PREPARED TO DO ANYTHING TO END YOUR LIFE?: NO
6. HAVE YOU EVER DONE ANYTHING, STARTED TO DO ANYTHING, OR PREPARED TO DO ANYTHING TO END YOUR LIFE?: NO
2. HAVE YOU ACTUALLY HAD ANY THOUGHTS OF KILLING YOURSELF?: NO
1. IN THE PAST MONTH, HAVE YOU WISHED YOU WERE DEAD OR WISHED YOU COULD GO TO SLEEP AND NOT WAKE UP?: NO

## 2024-07-06 ASSESSMENT — PAIN - FUNCTIONAL ASSESSMENT
PAIN_FUNCTIONAL_ASSESSMENT: 0-10
PAIN_FUNCTIONAL_ASSESSMENT: 0-10

## 2024-07-06 ASSESSMENT — PAIN SCALES - GENERAL
PAINLEVEL_OUTOF10: 0 - NO PAIN
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
## 2024-07-07 VITALS
BODY MASS INDEX: 18.76 KG/M2 | HEART RATE: 58 BPM | RESPIRATION RATE: 16 BRPM | WEIGHT: 134 LBS | TEMPERATURE: 97.7 F | SYSTOLIC BLOOD PRESSURE: 116 MMHG | HEIGHT: 71 IN | DIASTOLIC BLOOD PRESSURE: 73 MMHG | OXYGEN SATURATION: 97 %

## 2024-07-07 LAB
ALBUMIN SERPL BCP-MCNC: 3.5 G/DL (ref 3.4–5)
ALP SERPL-CCNC: 76 U/L (ref 33–136)
ALT SERPL W P-5'-P-CCNC: 10 U/L (ref 10–52)
ANION GAP SERPL CALC-SCNC: 11 MMOL/L (ref 10–20)
AST SERPL W P-5'-P-CCNC: 16 U/L (ref 9–39)
BILIRUB SERPL-MCNC: 0.9 MG/DL (ref 0–1.2)
BUN SERPL-MCNC: 24 MG/DL (ref 6–23)
CALCIUM SERPL-MCNC: 9 MG/DL (ref 8.6–10.3)
CHLORIDE SERPL-SCNC: 105 MMOL/L (ref 98–107)
CO2 SERPL-SCNC: 27 MMOL/L (ref 21–32)
CREAT SERPL-MCNC: 1.41 MG/DL (ref 0.5–1.3)
EGFRCR SERPLBLD CKD-EPI 2021: 48 ML/MIN/1.73M*2
ERYTHROCYTE [DISTWIDTH] IN BLOOD BY AUTOMATED COUNT: 13.4 % (ref 11.5–14.5)
GLUCOSE SERPL-MCNC: 93 MG/DL (ref 74–99)
HCT VFR BLD AUTO: 43.9 % (ref 41–52)
HGB BLD-MCNC: 14.5 G/DL (ref 13.5–17.5)
HOLD SPECIMEN: NORMAL
INR PPP: 5.6 (ref 0.9–1.1)
MCH RBC QN AUTO: 30.7 PG (ref 26–34)
MCHC RBC AUTO-ENTMCNC: 33 G/DL (ref 32–36)
MCV RBC AUTO: 93 FL (ref 80–100)
NRBC BLD-RTO: 0 /100 WBCS (ref 0–0)
PLATELET # BLD AUTO: 198 X10*3/UL (ref 150–450)
POTASSIUM SERPL-SCNC: 3.9 MMOL/L (ref 3.5–5.3)
PROT SERPL-MCNC: 5.8 G/DL (ref 6.4–8.2)
PROTHROMBIN TIME: 64.1 SECONDS (ref 9.8–12.8)
RBC # BLD AUTO: 4.73 X10*6/UL (ref 4.5–5.9)
SODIUM SERPL-SCNC: 139 MMOL/L (ref 136–145)
WBC # BLD AUTO: 6.2 X10*3/UL (ref 4.4–11.3)

## 2024-07-07 PROCEDURE — G0378 HOSPITAL OBSERVATION PER HR: HCPCS

## 2024-07-07 PROCEDURE — 36415 COLL VENOUS BLD VENIPUNCTURE: CPT | Performed by: INTERNAL MEDICINE

## 2024-07-07 PROCEDURE — 99233 SBSQ HOSP IP/OBS HIGH 50: CPT | Performed by: INTERNAL MEDICINE

## 2024-07-07 PROCEDURE — 85027 COMPLETE CBC AUTOMATED: CPT | Performed by: INTERNAL MEDICINE

## 2024-07-07 PROCEDURE — 2500000002 HC RX 250 W HCPCS SELF ADMINISTERED DRUGS (ALT 637 FOR MEDICARE OP, ALT 636 FOR OP/ED): Performed by: INTERNAL MEDICINE

## 2024-07-07 PROCEDURE — 2500000001 HC RX 250 WO HCPCS SELF ADMINISTERED DRUGS (ALT 637 FOR MEDICARE OP): Performed by: INTERNAL MEDICINE

## 2024-07-07 PROCEDURE — 85610 PROTHROMBIN TIME: CPT | Performed by: INTERNAL MEDICINE

## 2024-07-07 PROCEDURE — 80053 COMPREHEN METABOLIC PANEL: CPT | Performed by: INTERNAL MEDICINE

## 2024-07-07 RX ORDER — CALCITRIOL 0.25 UG/1
0.25 CAPSULE ORAL DAILY
Status: DISCONTINUED | OUTPATIENT
Start: 2024-07-07 | End: 2024-07-09 | Stop reason: HOSPADM

## 2024-07-07 RX ORDER — QUETIAPINE FUMARATE 25 MG/1
25 TABLET, FILM COATED ORAL NIGHTLY
Status: DISCONTINUED | OUTPATIENT
Start: 2024-07-07 | End: 2024-07-09 | Stop reason: HOSPADM

## 2024-07-07 RX ORDER — DONEPEZIL HYDROCHLORIDE 10 MG/1
10 TABLET, FILM COATED ORAL NIGHTLY
Status: DISCONTINUED | OUTPATIENT
Start: 2024-07-07 | End: 2024-07-09 | Stop reason: HOSPADM

## 2024-07-07 RX ORDER — ALLOPURINOL 100 MG/1
100 TABLET ORAL DAILY
Status: DISCONTINUED | OUTPATIENT
Start: 2024-07-07 | End: 2024-07-09 | Stop reason: HOSPADM

## 2024-07-07 RX ADMIN — Medication 3 MG: at 21:43

## 2024-07-07 RX ADMIN — QUETIAPINE FUMARATE 25 MG: 25 TABLET ORAL at 21:43

## 2024-07-07 RX ADMIN — ALLOPURINOL 100 MG: 100 TABLET ORAL at 10:57

## 2024-07-07 RX ADMIN — CALCITRIOL CAPSULES 0.25 MCG 0.25 MCG: 0.25 CAPSULE ORAL at 10:57

## 2024-07-07 RX ADMIN — DONEPEZIL HYDROCHLORIDE 10 MG: 10 TABLET ORAL at 21:43

## 2024-07-07 RX ADMIN — PANTOPRAZOLE SODIUM 40 MG: 40 TABLET, DELAYED RELEASE ORAL at 07:06

## 2024-07-07 ASSESSMENT — PAIN SCALES - GENERAL
PAINLEVEL_OUTOF10: 0 - NO PAIN
PAINLEVEL_OUTOF10: 0 - NO PAIN
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
## 2024-07-07 NOTE — PROGRESS NOTES
Slim Saldivar is a 89 y.o. male on day 0 of admission presenting with AMS (altered mental status).      Subjective   Patient doing well.  Talked about reason for being in the hospital.  He is much more calm and cooperative.  States that he is agreeable to going to rehab.  INR is elevated.  Denies taking extra Coumadin reported already eating breakfast    Objective     Last Recorded Vitals  /64 (BP Location: Right arm, Patient Position: Lying)   Pulse 62   Temp 36.4 °C (97.5 °F) (Temporal)   Resp 16   Wt 60.8 kg (134 lb)   SpO2 97%   Intake/Output last 3 Shifts:    Intake/Output Summary (Last 24 hours) at 7/7/2024 1021  Last data filed at 7/6/2024 2215  Gross per 24 hour   Intake 999 ml   Output 100 ml   Net 899 ml       Admission Weight  Weight: 68 kg (150 lb) (07/06/24 1752)    Daily Weight  07/06/24 : 60.8 kg (134 lb)      Physical Exam:  General: Not in acute distress, alert.  Disheveled  HEENT: PERRLA, head intact and normocephalic  Neck: Normal to inspection  Lungs: Clear to auscultation, work of breathing within normal limit  Cardiac: Regular rate and rhythm  Abdomen: Soft nontender, positive bowel sounds  : Exam deferred  Skin: Intact  Hematology: No petechia or excessive ecchymosis  Musculoskeletal: Without significant trauma  Neurological: Alert awake oriented, no focal deficit, cranial nerves grossly intact  Psych: Calm and cooperative    Relevant Results  Scheduled medications  allopurinol, 100 mg, oral, Daily  calcitriol, 0.25 mcg, oral, Daily  donepezil, 10 mg, oral, Nightly  pantoprazole, 40 mg, oral, Daily before breakfast   Or  pantoprazole, 40 mg, intravenous, Daily before breakfast      Continuous medications     PRN medications  PRN medications: acetaminophen **OR** acetaminophen **OR** acetaminophen, acetaminophen **OR** acetaminophen **OR** acetaminophen, melatonin, metoclopramide **OR** metoclopramide, ondansetron ODT **OR** ondansetron   Labs  Results from last 7 days   Lab Units  07/07/24  0638 07/06/24  1807   WBC AUTO x10*3/uL 6.2 5.4   HEMOGLOBIN g/dL 14.5 14.7   HEMATOCRIT % 43.9 43.0   PLATELETS AUTO x10*3/uL 198 184     Results from last 7 days   Lab Units 07/07/24  0638 07/06/24  1807   SODIUM mmol/L 139 138   POTASSIUM mmol/L 3.9 3.8   CHLORIDE mmol/L 105 104   CO2 mmol/L 27 25   BUN mg/dL 24* 21   CREATININE mg/dL 1.41* 1.44*   CALCIUM mg/dL 9.0 9.3   PROTEIN TOTAL g/dL 5.8* 6.1*   BILIRUBIN TOTAL mg/dL 0.9 1.1   ALK PHOS U/L 76 75   ALT U/L 10 9*   AST U/L 16 15   GLUCOSE mg/dL 93 91       CT head wo IV contrast    Result Date: 7/6/2024  Interpreted By:  Huyen Liu, STUDY: CT HEAD WO IV CONTRAST;  7/6/2024 7:59 pm   INDICATION: Signs/Symptoms:altered mental status.   COMPARISON: None.   ACCESSION NUMBER(S): [id number]   ORDERING CLINICIAN: CODY ARCEO   TECHNIQUE: Noncontrast axial CT scan of head was performed. Angled reformats in brain and bone windows were generated. The images were reviewed in bone, brain, blood and soft tissue windows.   FINDINGS:   The ventricles, cisterns and sulci are prominent, consistent with mild diffuse volume loss. There are areas of nonspecific white matter hypodensity, which are probably age-related or microvascular in nature.   Gray-white differentiation is intact and there is no evidence of acute cortical infarct. No mass, mass effect or midline shift is seen. There is no evidence of hemorrhage.   The visualized paranasal sinuses are clear. Nasal septal deviation to the right.       No evidence of acute cortical infarct or intracranial hemorrhage. Chronic changes as described. .   MACRO: None   Signed by: Huyen Liu 7/6/2024 8:21 PM Dictation workstation:   [id number]    XR chest 1 view    Result Date: 7/6/2024  Interpreted By:  Brendan Fonseca, STUDY: XR CHEST 1 VIEW;  7/6/2024 6:03 pm   INDICATION: Signs/Symptoms:Chest Pain.   COMPARISON: 10/03/2020   ACCESSION NUMBER(S): [id number]   ORDERING CLINICIAN: CODY ARCEO   FINDINGS:          CARDIOMEDIASTINAL SILHOUETTE: Cardiomediastinal silhouette is normal in size and configuration.   LUNGS: The lungs are hyperinflated but clear. No consolidation or effusion   ABDOMEN: No remarkable upper abdominal findings.   BONES: No acute osseous changes.       Hyperinflated lungs. No evidence of acute cardiopulmonary process.     MACRO: None   Signed by: Brendan Fonseca 7/6/2024 6:05 PM Dictation workstation:   [id number]          Assessment/Plan   Slim Saldivar is a 89 y.o. male on day 0 of admission presenting with AMS (altered mental status).  Principal Problem:    AMS (altered mental status)  Active Problems:    Dementia with aggressive behavior (Multi)    Stage 3b chronic kidney disease (Multi)    Supratherapeutic INR      89-year-old male with past medical history of dementia, CKD, atrial fibrillation on Coumadin, hyperlipidemia, mitral valve disorder, hypertension presented to emergency department for failure to thrive with agitation and inability to care for himself.    Agitation/altered mental status  Resolved  Patient is calm and cooperative and doing well  Could be sundowning at nighttime  Will monitor closely  As needed Seroquel ordered and scheduled Seroquel    Dementia  Continue with Aricept  Received Depakote one-time dose last night  Will monitor  PT OT evaluation    Failure to thrive  Could consider Remeron if patient has lack of appetite  PT OT evaluation    Gout  Continue with allopurinol    Atrial fibrillation  INR is supratherapeutic  Hold Coumadin and monitor INR  Likely combination of poor oral intake causing higher INR  Encouraging patient to increase oral intake  Telemetry    CKD  Creatinine close to baseline  Can discontinue IV fluids  Encouraging oral intake  UA is unremarkable on admission besides small amount of blood  Monitor clinically with supratherapeutic INR    DVT prophylaxis  SCDs  INR is supratherapeutic       Plan discussed with patient at bedside  High level of MDM based  on above issue and discussing plan    This note is created using voice recognition software. All efforts are made to minimize errors, if there are errors there due to transcription.    Alex Jerry  [facility]

## 2024-07-07 NOTE — CARE PLAN
The patient's goals for the shift include      The clinical goals for the shift include labs/vital signs stable, no complaints/agitation, and patient safety maintained

## 2024-07-07 NOTE — CARE PLAN
The patient's goals for the shift include     Problem: Pain - Adult  Goal: Verbalizes/displays adequate comfort level or baseline comfort level  Outcome: Progressing     Problem: Safety - Adult  Goal: Free from fall injury  Outcome: Progressing     Problem: Discharge Planning  Goal: Discharge to home or other facility with appropriate resources  Outcome: Progressing     Problem: Chronic Conditions and Co-morbidities  Goal: Patient's chronic conditions and co-morbidity symptoms are monitored and maintained or improved  Outcome: Progressing     Problem: Cognitive Loss/Dementia  Goal: I will return to previous cognitive status  Outcome: Progressing      The clinical goals for the shift include labs/vital signs stable, no complaints/agitation, and patient safety maintained    Over the shift, the patient did make progress toward the following goals. Patient's labs and vital signs stable. No complaints of agitation. Patient safety maintained.

## 2024-07-08 LAB
ALBUMIN SERPL BCP-MCNC: 3.6 G/DL (ref 3.4–5)
ALP SERPL-CCNC: 76 U/L (ref 33–136)
ALT SERPL W P-5'-P-CCNC: 9 U/L (ref 10–52)
ANION GAP SERPL CALC-SCNC: 13 MMOL/L (ref 10–20)
AST SERPL W P-5'-P-CCNC: 13 U/L (ref 9–39)
ATRIAL RATE: 75 BPM
ATRIAL RATE: 92 BPM
BASOPHILS # BLD AUTO: 0.06 X10*3/UL (ref 0–0.1)
BASOPHILS NFR BLD AUTO: 1 %
BILIRUB SERPL-MCNC: 0.9 MG/DL (ref 0–1.2)
BUN SERPL-MCNC: 22 MG/DL (ref 6–23)
CALCIUM SERPL-MCNC: 9.2 MG/DL (ref 8.6–10.3)
CHLORIDE SERPL-SCNC: 105 MMOL/L (ref 98–107)
CO2 SERPL-SCNC: 26 MMOL/L (ref 21–32)
CREAT SERPL-MCNC: 1.39 MG/DL (ref 0.5–1.3)
EGFRCR SERPLBLD CKD-EPI 2021: 48 ML/MIN/1.73M*2
EOSINOPHIL # BLD AUTO: 0.23 X10*3/UL (ref 0–0.4)
EOSINOPHIL NFR BLD AUTO: 3.7 %
ERYTHROCYTE [DISTWIDTH] IN BLOOD BY AUTOMATED COUNT: 13.6 % (ref 11.5–14.5)
GLUCOSE SERPL-MCNC: 94 MG/DL (ref 74–99)
HCT VFR BLD AUTO: 43.4 % (ref 41–52)
HGB BLD-MCNC: 14.3 G/DL (ref 13.5–17.5)
IMM GRANULOCYTES # BLD AUTO: 0.03 X10*3/UL (ref 0–0.5)
IMM GRANULOCYTES NFR BLD AUTO: 0.5 % (ref 0–0.9)
INR PPP: 4.6 (ref 0.9–1.1)
LYMPHOCYTES # BLD AUTO: 1.68 X10*3/UL (ref 0.8–3)
LYMPHOCYTES NFR BLD AUTO: 27.3 %
MAGNESIUM SERPL-MCNC: 1.85 MG/DL (ref 1.6–2.4)
MCH RBC QN AUTO: 29.9 PG (ref 26–34)
MCHC RBC AUTO-ENTMCNC: 32.9 G/DL (ref 32–36)
MCV RBC AUTO: 91 FL (ref 80–100)
MONOCYTES # BLD AUTO: 0.55 X10*3/UL (ref 0.05–0.8)
MONOCYTES NFR BLD AUTO: 8.9 %
NEUTROPHILS # BLD AUTO: 3.6 X10*3/UL (ref 1.6–5.5)
NEUTROPHILS NFR BLD AUTO: 58.6 %
NRBC BLD-RTO: 0 /100 WBCS (ref 0–0)
PLATELET # BLD AUTO: 190 X10*3/UL (ref 150–450)
POTASSIUM SERPL-SCNC: 3.5 MMOL/L (ref 3.5–5.3)
PROT SERPL-MCNC: 5.9 G/DL (ref 6.4–8.2)
PROTHROMBIN TIME: 52.4 SECONDS (ref 9.8–12.8)
Q ONSET: 222 MS
Q ONSET: 224 MS
QRS COUNT: 12 BEATS
QRS COUNT: 13 BEATS
QRS DURATION: 72 MS
QRS DURATION: 80 MS
QT INTERVAL: 384 MS
QT INTERVAL: 392 MS
QTC CALCULATION(BAZETT): 432 MS
QTC CALCULATION(BAZETT): 446 MS
QTC FREDERICIA: 415 MS
QTC FREDERICIA: 427 MS
R AXIS: 51 DEGREES
R AXIS: 67 DEGREES
RBC # BLD AUTO: 4.78 X10*6/UL (ref 4.5–5.9)
SODIUM SERPL-SCNC: 140 MMOL/L (ref 136–145)
T AXIS: 56 DEGREES
T AXIS: 65 DEGREES
T OFFSET: 416 MS
T OFFSET: 418 MS
VENTRICULAR RATE: 76 BPM
VENTRICULAR RATE: 78 BPM
WBC # BLD AUTO: 6.2 X10*3/UL (ref 4.4–11.3)

## 2024-07-08 PROCEDURE — 84075 ASSAY ALKALINE PHOSPHATASE: CPT | Performed by: INTERNAL MEDICINE

## 2024-07-08 PROCEDURE — 97535 SELF CARE MNGMENT TRAINING: CPT | Mod: GO

## 2024-07-08 PROCEDURE — 2500000004 HC RX 250 GENERAL PHARMACY W/ HCPCS (ALT 636 FOR OP/ED): Performed by: INTERNAL MEDICINE

## 2024-07-08 PROCEDURE — 99231 SBSQ HOSP IP/OBS SF/LOW 25: CPT | Performed by: INTERNAL MEDICINE

## 2024-07-08 PROCEDURE — 85610 PROTHROMBIN TIME: CPT | Performed by: INTERNAL MEDICINE

## 2024-07-08 PROCEDURE — 36415 COLL VENOUS BLD VENIPUNCTURE: CPT | Performed by: INTERNAL MEDICINE

## 2024-07-08 PROCEDURE — 96372 THER/PROPH/DIAG INJ SC/IM: CPT | Performed by: INTERNAL MEDICINE

## 2024-07-08 PROCEDURE — 83735 ASSAY OF MAGNESIUM: CPT | Performed by: INTERNAL MEDICINE

## 2024-07-08 PROCEDURE — 97161 PT EVAL LOW COMPLEX 20 MIN: CPT | Mod: GP

## 2024-07-08 PROCEDURE — 2500000001 HC RX 250 WO HCPCS SELF ADMINISTERED DRUGS (ALT 637 FOR MEDICARE OP): Performed by: INTERNAL MEDICINE

## 2024-07-08 PROCEDURE — 2500000002 HC RX 250 W HCPCS SELF ADMINISTERED DRUGS (ALT 637 FOR MEDICARE OP, ALT 636 FOR OP/ED): Performed by: INTERNAL MEDICINE

## 2024-07-08 PROCEDURE — 97165 OT EVAL LOW COMPLEX 30 MIN: CPT | Mod: GO

## 2024-07-08 PROCEDURE — 85025 COMPLETE CBC W/AUTO DIFF WBC: CPT | Performed by: INTERNAL MEDICINE

## 2024-07-08 PROCEDURE — G0378 HOSPITAL OBSERVATION PER HR: HCPCS

## 2024-07-08 RX ORDER — HALOPERIDOL 5 MG/ML
5 INJECTION INTRAMUSCULAR ONCE
Status: COMPLETED | OUTPATIENT
Start: 2024-07-08 | End: 2024-07-08

## 2024-07-08 RX ORDER — POTASSIUM CHLORIDE 20 MEQ/1
20 TABLET, EXTENDED RELEASE ORAL ONCE
Status: COMPLETED | OUTPATIENT
Start: 2024-07-08 | End: 2024-07-08

## 2024-07-08 RX ORDER — HALOPERIDOL 5 MG/ML
3 INJECTION INTRAMUSCULAR ONCE
Status: DISCONTINUED | OUTPATIENT
Start: 2024-07-08 | End: 2024-07-08

## 2024-07-08 RX ORDER — WARFARIN 3 MG/1
3 TABLET ORAL NIGHTLY
Qty: 30 TABLET | Refills: 0 | Status: SHIPPED | OUTPATIENT
Start: 2024-07-10

## 2024-07-08 RX ORDER — QUETIAPINE FUMARATE 25 MG/1
25 TABLET, FILM COATED ORAL NIGHTLY
Qty: 30 TABLET | Refills: 0 | Status: SHIPPED | OUTPATIENT
Start: 2024-07-08 | End: 2024-08-07

## 2024-07-08 RX ADMIN — HALOPERIDOL LACTATE 5 MG: 5 INJECTION, SOLUTION INTRAMUSCULAR at 21:13

## 2024-07-08 RX ADMIN — ALLOPURINOL 100 MG: 100 TABLET ORAL at 09:28

## 2024-07-08 RX ADMIN — QUETIAPINE FUMARATE 25 MG: 25 TABLET ORAL at 20:27

## 2024-07-08 RX ADMIN — DONEPEZIL HYDROCHLORIDE 10 MG: 10 TABLET ORAL at 20:26

## 2024-07-08 RX ADMIN — CALCITRIOL CAPSULES 0.25 MCG 0.25 MCG: 0.25 CAPSULE ORAL at 09:28

## 2024-07-08 RX ADMIN — POTASSIUM CHLORIDE 20 MEQ: 1500 TABLET, EXTENDED RELEASE ORAL at 09:28

## 2024-07-08 RX ADMIN — PANTOPRAZOLE SODIUM 40 MG: 40 TABLET, DELAYED RELEASE ORAL at 05:54

## 2024-07-08 ASSESSMENT — COGNITIVE AND FUNCTIONAL STATUS - GENERAL
DRESSING REGULAR LOWER BODY CLOTHING: A LITTLE
MOBILITY SCORE: 18
PERSONAL GROOMING: A LITTLE
DRESSING REGULAR UPPER BODY CLOTHING: A LITTLE
HELP NEEDED FOR BATHING: A LITTLE
TOILETING: A LITTLE
STANDING UP FROM CHAIR USING ARMS: A LITTLE
DAILY ACTIVITIY SCORE: 18
EATING MEALS: A LITTLE
STANDING UP FROM CHAIR USING ARMS: A LITTLE
TURNING FROM BACK TO SIDE WHILE IN FLAT BAD: A LITTLE
DAILY ACTIVITIY SCORE: 18
WALKING IN HOSPITAL ROOM: A LITTLE
MOVING FROM LYING ON BACK TO SITTING ON SIDE OF FLAT BED WITH BEDRAILS: A LITTLE
MOVING TO AND FROM BED TO CHAIR: A LITTLE
CLIMB 3 TO 5 STEPS WITH RAILING: A LITTLE
HELP NEEDED FOR BATHING: A LITTLE
TURNING FROM BACK TO SIDE WHILE IN FLAT BAD: A LITTLE
MOVING FROM LYING ON BACK TO SITTING ON SIDE OF FLAT BED WITH BEDRAILS: A LITTLE
PERSONAL GROOMING: A LITTLE
MOVING TO AND FROM BED TO CHAIR: A LITTLE
MOBILITY SCORE: 18
WALKING IN HOSPITAL ROOM: A LITTLE
DRESSING REGULAR LOWER BODY CLOTHING: A LITTLE
CLIMB 3 TO 5 STEPS WITH RAILING: A LITTLE
EATING MEALS: A LITTLE
DRESSING REGULAR UPPER BODY CLOTHING: A LITTLE
TOILETING: A LITTLE

## 2024-07-08 ASSESSMENT — PAIN - FUNCTIONAL ASSESSMENT
PAIN_FUNCTIONAL_ASSESSMENT: 0-10

## 2024-07-08 ASSESSMENT — PAIN SCALES - GENERAL
PAINLEVEL_OUTOF10: 0 - NO PAIN

## 2024-07-08 ASSESSMENT — ACTIVITIES OF DAILY LIVING (ADL)
HOME_MANAGEMENT_TIME_ENTRY: 10
BATHING_ASSISTANCE: MODERATE

## 2024-07-08 NOTE — DISCHARGE SUMMARY
Discharge Diagnosis  AMS (altered mental status)    Issues Requiring Follow-Up  Follow-up with primary care provider after discharge  We are recommending holding Coumadin on 7/9 and resuming it on 7/10/2024    Discharge Meds     Your medication list        START taking these medications        Instructions Last Dose Given Next Dose Due   QUEtiapine 25 mg tablet  Commonly known as: SEROquel      Take 1 tablet (25 mg) by mouth once daily at bedtime.              CHANGE how you take these medications        Instructions Last Dose Given Next Dose Due   warfarin 3 mg tablet  Commonly known as: Coumadin  Start taking on: July 10, 2024  What changed:   medication strength  how much to take  when to take this  These instructions start on July 10, 2024. If you are unsure what to do until then, ask your doctor or other care provider.      Take 1 tablet (3 mg) by mouth once daily at bedtime. Do not fill before July 10, 2024.              CONTINUE taking these medications        Instructions Last Dose Given Next Dose Due   allopurinol 100 mg tablet  Commonly known as: Zyloprim      TAKE 1 TABLET DAILY       calcitriol 0.25 mcg capsule  Commonly known as: Rocaltrol           donepezil 10 mg tablet  Commonly known as: Aricept           metoprolol succinate XL 25 mg 24 hr tablet  Commonly known as: Toprol-XL                     Where to Get Your Medications        These medications were sent to [id number] [place] [id number] [address] [address]      Phone: [phone]   QUEtiapine 25 mg tablet  warfarin 3 mg tablet         Test Results Pending At Discharge  Pending Labs       No current pending labs.            Hospital Course  89-year-old male with past medical history of dementia, CKD, atrial fibrillation on Coumadin, hyperlipidemia, mitral valve disorder, hypertension presented to emergency department for failure to thrive with agitation and inability to care for himself.   Patient was admitted and started on Seroquel after which she has been calm and cooperative and he was given one-time dose of Depakote at nighttime on the very first night.  He also had elevated INR for which Coumadin was held and his INR is coming down.  We are recommending to hold Coumadin until the 10th and that he can resume it.  His INR is downtrending and patient is doing really well.  He will be discharged.    New discharge date of 7/9/2024 to [facility]    32 minutes spent in discharge timing    Pertinent Physical Exam At Time of Discharge  General: Not in acute distress, alert  HEENT: PERRLA, head intact and normocephalic  Neck: Normal to inspection  Lungs: Clear to auscultation, work of breathing within normal limit  Cardiac: Regular rate and rhythm  Abdomen: Soft nontender, positive bowel sounds  : Exam deferred  Skin: Intact  Hematology: No petechia or excessive ecchymosis  Musculoskeletal: Without significant trauma  Neurological: Alert awake oriented, slightly confused, no focal deficit, cranial nerves grossly intact  Psych: Calm and cooperative    Outpatient Follow-Up  Future Appointments   Date Time Provider Department Center   8/26/2024  2:00 PM Jessie Martinez MD [id number] [place]   9/10/2024  2:30 PM Landen Capps MD [id number] [place]   9/26/2024  2:00 PM JOVON Lorenzana-CNP [id number] [place]         Alex Jerry MD

## 2024-07-08 NOTE — PROGRESS NOTES
"Nutrition Initial Assessment:   Nutrition Assessment    Reason for Assessment: Admission nursing screening (MST=3; unsure weight loss with poor PO)    Patient is a 89 y.o. male presenting with altered mental status and agitation with an inability to maintain his current living environment.   Past medical history includes dementia, CKD, atrial fibrillation on Coumadin, hyperlipidemia, mitral valve disorder, hypertension     Nutrition History:  Energy Intake: Fair 50-75 %, Good > 75 % (Regular diet (7/07) 100% breakfast & 100% lunch)  Food and Nutrient History: Pt answers all questions with \"I don't know\". He was not interested in breakfast while laying in bed, but once up in the chair he made a sandwich with the toast and eggs and ate very well. Called his spouse who is a patient here at [facility] (room 3003) due to a recent fall. She explains that pt has lost weight from his usual weight of 152 lbs. She also states his intake is \"hit or miss\". If he is not hungry, he will not eat. She notes that eating has become pretty tricky with him. She is hopeful that once they move to [facility] together and they resume eating together, he will start to eat better. She thinks the Ensure supplement (no екатерина) will be good for him.  Vitamin/Herbal Supplement Use: None noted on home med list.  Food Allergies/Intolerances:  None  GI Symptoms: None  Oral Problems: None   0-10 (Numeric) Pain Score: 0 - No pain      Anthropometrics:  Height: 180.3 cm (5' 11\")   Weight: 60.8 kg (134 lb)   BMI (Calculated): 18.7  IBW/kg (Dietitian Calculated): 78.02 kg  Percent of IBW: 77.91 %       Weight History:   Wt Readings from Last 10 Encounters:   07/06/24 60.8 kg (134 lb)   03/12/24 65.8 kg (145 lb)   02/26/24 67.6 kg (149 lb)   09/12/23 67.6 kg (149 lb)   08/21/23 65.3 kg (144 lb)   08/04/23 70.3 kg (155 lb)   03/14/23 70.5 kg (155 lb 8 oz)   09/20/22 70.3 kg (155 lb)   08/18/22 69.9 kg (154 lb)   05/12/22 68 kg (150 lb)       Weight Change %:  Weight " History / % Weight Change: loss of 6.8kg(10%) in 5 months  Significant Weight Loss: Yes    Nutrition Focused Physical Exam Findings:    Subcutaneous Fat Loss:   Orbital Fat Pads: Mild-Moderate (slight dark circles and slight hollowing)  Buccal Fat Pads: Mild-Moderate (flat cheeks, minimal bounce)  Triceps: Mild-Moderate (less than ample fat tissue)  Ribs: Mild-Moderate (ribs apparent, depressions between them less pronounced, iliac crest somewhat prominent)  Muscle Wasting:  Temporalis: Mild-Moderate (slight depression)  Pectoralis (Clavicular Region): Mild-Moderate (some protrusion of clavicle)  Deltoid/Trapezius: Mild-Moderate (slight protrusion of acromion process)  Interosseous: Mild-Moderate (slightly depressed area between thumb and forefinger)  Trapezius/Infraspinatus/Supraspinatus (Scapular Region): Defer  Quadriceps: Severe (depressions on inner and outer thigh)  Gastrocnemius: Severe (minimal muscle definition)  Edema:  Edema: none  Physical Findings:  Hair: Negative  Eyes: Negative  Mouth: Negative  Nails: Negative  Skin: Negative    Nutrition Significant Labs:  CBC Trend:   Results from last 7 days   Lab Units 07/08/24 0531 07/07/24  0638 07/06/24  1807   WBC AUTO x10*3/uL 6.2 6.2 5.4   RBC AUTO x10*6/uL 4.78 4.73 4.76   HEMOGLOBIN g/dL 14.3 14.5 14.7   HEMATOCRIT % 43.4 43.9 43.0   MCV fL 91 93 90   PLATELETS AUTO x10*3/uL 190 198 184    , BMP Trend:   Results from last 7 days   Lab Units 07/08/24 0531 07/07/24  0638 07/06/24  1807   GLUCOSE mg/dL 94 93 91   CALCIUM mg/dL 9.2 9.0 9.3   SODIUM mmol/L 140 139 138   POTASSIUM mmol/L 3.5 3.9 3.8   CO2 mmol/L 26 27 25   CHLORIDE mmol/L 105 105 104   BUN mg/dL 22 24* 21   CREATININE mg/dL 1.39* 1.41* 1.44*        Nutrition Specific Medications:  Current Outpatient Medications   Medication Instructions    allopurinol (ZYLOPRIM) 100 mg, oral, Daily    calcitriol (Rocaltrol) 0.25 mcg capsule Take 1 capsule (0.25 mcg) by mouth 5 times a week Mondays through  Fridays. Take 2 capsules (0.5 mcg) by mouth 2 times a week on Sundays and Saturdays.    donepezil (ARICEPT) 10 mg, oral, Nightly    metoprolol succinate XL (TOPROL-XL) 50 mg, oral, Daily    QUEtiapine (SEROQUEL) 25 mg, oral, Nightly    [START ON 7/10/2024] warfarin (COUMADIN) 3 mg, oral, Nightly         I/O:   Last BM Date: 07/07/24;      Dietary Orders (From admission, onward)       Start     Ordered    07/08/24 1445  Oral nutritional supplements  Until discontinued        Question Answer Comment   Deliver with All meals    Select supplement: Product from home - please specify    Additional Details ENSURE COMPACT - vanilla only        07/08/24 1444 07/06/24 2142  Adult diet Regular  Diet effective now        Question:  Diet type  Answer:  Regular    07/06/24 2141                     Estimated Needs:   Total Energy Estimated Needs (kCal):  (2125-3647 kcal (25-30 kcal/kg))     Total Protein Estimated Needs (g):  (61-73g protein (1.0-1.2g/kg))     Total Fluid Estimated Needs (mL):  (1500-1800mL (1mL/kcal))           Nutrition Diagnosis   Malnutrition Diagnosis  Patient has Malnutrition Diagnosis: Yes  Diagnosis Status: New  Malnutrition Diagnosis: Moderate malnutrition related to chronic disease or condition  As Evidenced by: cognitive decline with significant weight loss of 10% in 5 months and decreased oral intakes of <75% of estimated nutrition needs.    Nutrition Diagnosis  Patient has Nutrition Diagnosis: Yes  Diagnosis Status (1): New  Nutrition Diagnosis 1: Impaired ability to prepare foods/meals  Related to (1): necessity of hospital admit for placement while his spouse is unable to care for him.  As Evidenced by (1): Spouse currently in hospital due to need for surgery after a recent fall.       Nutrition Interventions/Recommendations         Nutrition Prescription:  Individualized Nutrition Prescription Provided for : Continue REGULAR diet as ordered.        Nutrition Interventions:   Interventions: Meals  and snacks, Medical food supplement  Meals and Snacks: General healthful diet  Goal: Send 3 meals daily with HS snack.  Medical Food Supplement: Commercial beverage  Goal: Supplement intakes with 4-ounce ENSURE COMPACT at all meals (220 kcal/9g protein each) - no chocolate.         Nutrition Education:   Pt not appropriate.       Nutrition Monitoring and Evaluation   Food/Nutrient Related History Monitoring  Monitoring and Evaluation Plan: Amount of food, Mealtime behavior  Amount of Food: Estimated amout of food  Criteria: Pt to consume >75% of meals, ONS and snacks.  Criteria: Pt to receive non-select meals automatically to encourage intakes.    Body Composition/Growth/Weight History  Monitoring and Evaluation Plan: Weight  Weight: Measured weight  Criteria: Maintain stable weight.                   Time Spent (min): 30 minutes

## 2024-07-08 NOTE — PROGRESS NOTES
07/08/24 1031   Discharge Planning   Living Arrangements Spouse/significant other   Support Systems Spouse/significant other;Children   Type of Residence Private residence   Home or Post Acute Services Post acute facilities (Rehab/SNF/etc)   Type of Post Acute Facility Services Long term care   Patient expects to be discharged to: referral sent to [place] for their memory unit     The pt has a  hx of dementia. He lives with his partner who is his caregiver but she is currently a pt here in the hospital. Family is  unable to manage him at home and would like placement. Spoke with the pt's partner, Idania Caal, who is his POA. She states that they are working on going to an apartment at [facility] but the final paperwork has not been completed yet. Family had been working with [facility] to get the pt into their memory unit. The POA agrees with placement at [facility]. Asked the DSC to send a referral.

## 2024-07-08 NOTE — HOSPITAL COURSE
89-year-old male with past medical history of dementia, CKD, atrial fibrillation on Coumadin, hyperlipidemia, mitral valve disorder, hypertension presented to emergency department for failure to thrive with agitation and inability to care for himself.  Patient was admitted and started on Seroquel after which she has been calm and cooperative and he was given one-time dose of Depakote at nighttime on the very first night.  He also had elevated INR for which Coumadin was held and his INR is coming down.  We are recommending to hold Coumadin until the 10th and that he can resume it.  His INR is downtrending and patient is doing really well.  He will be discharged.    32 minutes spent in discharge timing

## 2024-07-08 NOTE — PROGRESS NOTES
[facility] is reviewing the case for respite care.  Message sent to [facility] for pasrr.      3:45pm; Met with pt's significant other/POA and pt's son.  Discussed options for pt's respite care needs, as pt's POA remains in the hospital.  They are in agreement with moving forward with [facility].  [facility] needs to confirm payment plan, but have overall accepted.

## 2024-07-08 NOTE — PROGRESS NOTES
Occupational Therapy    Evaluation    Patient Name: Slim Saldivar  MRN: [id number]  Today's Date: 7/8/2024  Time Calculation  Start Time: 1115  Stop Time: 1131  Time Calculation (min): 16 min  3134/3134-A    Assessment  IP OT Assessment  OT Assessment: Received orders for OT eval 7/6. Completed chart review, and  completed OT eval. Pt with decreased overall endurance, and with cognitive deficits, has increased risk for falls/injury. Recommend SNF to increase safety and independence with ADL's.  Prognosis: Good  End of Session Communication: Bedside nurse  End of Session Patient Position: Up in chair, Alarm on    Plan:  Treatment Interventions: ADL retraining, Functional transfer training, UE strengthening/ROM, Endurance training, Neuromuscular reeducation  OT Frequency: 3 times per week  OT Discharge Recommendations: Moderate intensity level of continued care (SNF)  OT Recommended Transfer Status: Minimal assist, Assist of 1  OT - OK to Discharge: Yes (to next level of care)    Subjective     Current Problem:  1. Agitation due to dementia (Multi)  QUEtiapine (SEROquel) 25 mg tablet      2. Elevated INR  warfarin (Coumadin) 3 mg tablet          General:  General  Reason for Referral: ADL's; Safety Assessment  Referred By: Alex Jerry  Prior to Session Communication: Bedside nurse  Patient Position Received: Bed, 3 rail up, Alarm on    General Visit Information:  Per EMR: pt presenting with altered mental status and agitation with an inability to maintain his current living environment.  Per ED report, patient has been living with a female caregiver until she was recently hospitalized several days prior.  There is some question as to whether or not patient may have become aggressive resulting in her injuries however that has not been made clear.  Family does endorse baseline dementia with episodes of agitation/aggression which they state limits their ability to care for him in their own homes.  They endorse working  with case management/social work to get patient placed into a more stable living environment.  They state that they have been trying to check on the patient for the last 24 hours however he has been refusing contact.  When family was finally able to touch base, they noted the patient had been lying in bed most of the time and do not believe he had been maintaining his own ADLs effectively.  At the time of arrival, patient is noted to be in no acute distress and at his reported baseline.  Laboratory studies obtained are consistent with his baseline values.  However his INR was noted to be elevated at 5.5 with no signs of active bleeding.  Admitted for further evaluation.      On arrival, pt supine in bed.  Pt in no apparent distress and agreeable to therapy.    Precautions:  Medical Precautions: Fall precautions  Precautions Comment: bed/chair alarm    Pain:  Pain Assessment  Pain Assessment: 0-10  0-10 (Numeric) Pain Score: 0 - No pain    Objective     Cognition:  Overall Cognitive Status: Impaired (Pt with dementia, and per EMR, can get aggressive at times)  Orientation Level: Disoriented to situation, Disoriented to time, Disoriented to place (pt A&O to name only; unable to state )  Memory:  (Pt is a poor historian)  Safety/Judgement:  (poor safety with impaired judgement)  Impulsive:  (Pt required cuing for safety throughout the eval)     Home Living:  Pt is a poor historian, and most information obtained from EMR. Pt  lives with his s/o (caregiver) in a 1 floor condo with 1 KIEL. Pt's s/o is currently admitted at hospital. Pt has a walk in tub. PLOF MOD I with 3ww. States is in the process of looking into care home at [facility]     Prior Function:  Pt was completing ADL's independently, and relying on s/o for household tasks, meds, shopping and transportation     ADL:  Eating Assistance: Minimal  Eating Deficit:  (cuing and assist to prep breakfast tray)  Grooming Assistance: Minimal  Bathing Assistance: Moderate  UE  Dressing Assistance: Moderate  LE Dressing Assistance: Moderate  Toileting Assistance with Device: Moderate  ADL Comments: Pt requires assist with all ADL's at baseline    Activity Tolerance:  Endurance: Endurance does not limit participation in activity    Functional Mobility:  Bed mobility  Supine to sit: SBA     Transfers  Sit to stand: CGA  Stand to sit: CGA; Vcs for guidance      Ambulation/Stairs  Pt ambulated 50 ft with CGA; pt with 1 mild LOB requiring Min A to self correct     Sitting Balance:  Static Sitting Balance  Static Sitting-Comment/Number of Minutes: good  Dynamic Sitting Balance  Dynamic Sitting-Comments: fair+    Standing Balance:  Static Standing Balance  Static Standing-Comment/Number of Minutes: fair  Dynamic Standing Balance  Dynamic Standing-Comments: fair- (Pt with small LOB initially upon ambulation without DME)    Sensation:  Sensation Comment: pt denies any numbness/tingling    Strength:  Strength Comments: RADAMES NOEL's    Extremities: RUE   RUE : Within Functional Limits and LUE   LUE: Within Functional Limits    Outcome Measures: [facility] Daily Activity  Putting on and taking off regular lower body clothing: A little  Bathing (including washing, rinsing, drying): A little  Putting on and taking off regular upper body clothing: A little  Toileting, which includes using toilet, bedpan or urinal: A little  Taking care of personal grooming such as brushing teeth: A little  Eating Meals: A little  Daily Activity - Total Score: 18    EDUCATION:  Education  Individual(s) Educated: Patient  Education Provided:  (safety)  Education Comment: Pt did not have carry over with education provided  Education Documentation  Body Mechanics, taught by Shana Costa OT at 7/8/2024  1:59 PM.  Learner: Patient  Readiness: Acceptance  Method: Explanation  Response: Verbalizes Understanding    Precautions, taught by Shana Costa OT at 7/8/2024  1:59 PM.  Learner: Patient  Readiness: Acceptance  Method:  Explanation  Response: Verbalizes Understanding    Education Comments  No comments found.        Goals:   Encounter Problems       Encounter Problems (Active)       OT Goals       OT Goal 1 (Progressing)       Start:  07/08/24    Expected End:  07/22/24       Pt will complete all bed mobility independently safely            OT Goal 2 (Progressing)       Start:  07/08/24    Expected End:  07/22/24       Pt will complete ADL's and mobility with good sit balance and fair+ stand balance            OT Goal 3 (Progressing)       Start:  07/08/24    Expected End:  07/22/24       Pt with complete all transfers safely with SBA             OT Goal 4 (Progressing)       Start:  07/08/24    Expected End:  07/22/24       Pt will complete LB dressing ADL's with SBA using adaptive device(s) as needed           OT Goal 5 (Progressing)       Start:  07/08/24    Expected End:  07/22/24       Pt with complete grooming ADL's with supervision and good stand balance                 Treatment: Pt was able to complete bed mobility with SBA. Pt with good sit balance on EOB. Pt completed all transfers with CGA. Pt ambulated in room/olvera with CGA using wheeled walker. Pt stood at sink with fair stand balance to complete grooming ADL's. Pt returned to and remained in bedside chair with chair alarm on and call light within reach.    Pt did then require Min A to setup breakfast tray. Pt was able to feed himself, once tray setup.

## 2024-07-08 NOTE — PROGRESS NOTES
Occupational Therapy    Occupational Therapy    Evaluation    Patient Name: Slim Saldivar  MRN: [id number]  Today's Date: 7/8/2024  Time Calculation  Start Time: 1115  Stop Time: 1131  Time Calculation (min): 16 min  3134/3134-A    Assessment  IP OT Assessment  OT Assessment: Received orders for OT eval 7/6. Completed chart review, and  completed OT eval. Pt with decreased overall endurance, and with cognitive deficits, has increased risk for falls/injury. Recommend SNF to increase safety and independence with ADL's.  Prognosis: Good  End of Session Communication: Bedside nurse  End of Session Patient Position: Up in chair, Alarm on    Plan:  Treatment Interventions: ADL retraining, Functional transfer training, UE strengthening/ROM, Endurance training, Neuromuscular reeducation  OT Frequency: 3 times per week  OT Discharge Recommendations: Moderate intensity level of continued care (SNF)  OT Recommended Transfer Status: Minimal assist, Assist of 1  OT - OK to Discharge: Yes (to next level of care)    Subjective     Current Problem:  1. Agitation due to dementia (Multi)  QUEtiapine (SEROquel) 25 mg tablet      2. Elevated INR  warfarin (Coumadin) 3 mg tablet          General:  General  Reason for Referral: ADL's; Safety Assessment  Referred By: Alex Jerry  Prior to Session Communication: Bedside nurse  Patient Position Received: Bed, 3 rail up, Alarm on    General Visit Information:  Per EMR: pt presenting with altered mental status and agitation with an inability to maintain his current living environment.  Per ED report, patient has been living with a female caregiver until she was recently hospitalized several days prior.  There is some question as to whether or not patient may have become aggressive resulting in her injuries however that has not been made clear.  Family does endorse baseline dementia with episodes of agitation/aggression which they state limits their ability to care for him in their own homes.   They endorse working with case management/social work to get patient placed into a more stable living environment.  They state that they have been trying to check on the patient for the last 24 hours however he has been refusing contact.  When family was finally able to touch base, they noted the patient had been lying in bed most of the time and do not believe he had been maintaining his own ADLs effectively.  At the time of arrival, patient is noted to be in no acute distress and at his reported baseline.  Laboratory studies obtained are consistent with his baseline values.  However his INR was noted to be elevated at 5.5 with no signs of active bleeding.  Admitted for further evaluation.      On arrival, pt supine in bed.  Pt in no apparent distress and agreeable to therapy.    Precautions:  Medical Precautions: Fall precautions  Precautions Comment: bed/chair alarm    Pain:  Pain Assessment  Pain Assessment: 0-10  0-10 (Numeric) Pain Score: 0 - No pain    Objective     Cognition:  Overall Cognitive Status: Impaired (Pt with dementia, and per EMR, can get aggressive at times)  Orientation Level: Disoriented to situation, Disoriented to time, Disoriented to place (pt A&O to name only; unable to state )  Memory:  (Pt is a poor historian)  Safety/Judgement:  (poor safety with impaired judgement)  Impulsive:  (Pt required cuing for safety throughout the eval)       ADL:  Eating Assistance: Minimal  Eating Deficit:  (cuing and assist to prep breakfast tray)  Grooming Assistance: Minimal  Bathing Assistance: Moderate  UE Dressing Assistance: Moderate  LE Dressing Assistance: Moderate  Toileting Assistance with Device: Moderate  ADL Comments: Pt requires assist with all ADL's at baseline    Activity Tolerance:  Endurance: Endurance does not limit participation in activity    Functional Mobility:  Bed mobility  Supine to sit: SBA     Transfers  Sit to stand: CGA  Stand to sit: CGA; Vcs for guidance       Ambulation/Stairs  Pt ambulated 50 ft with CGA; pt with 1 mild LOB requiring Min A to self correct     Sitting Balance:  Static Sitting Balance  Static Sitting-Comment/Number of Minutes: good  Dynamic Sitting Balance  Dynamic Sitting-Comments: fair+    Standing Balance:  Static Standing Balance  Static Standing-Comment/Number of Minutes: fair  Dynamic Standing Balance  Dynamic Standing-Comments: fair- (Pt with small LOB initially upon ambulation without DME)    Sensation:  Sensation Comment: pt denies any numbness/tingling    Strength:  Strength Comments: RADAMES UDANYA WFL's    Extremities: RUE   RUE : Within Functional Limits and LUE   LUE: Within Functional Limits    Outcome Measures: [facility] Daily Activity  Putting on and taking off regular lower body clothing: A little  Bathing (including washing, rinsing, drying): A little  Putting on and taking off regular upper body clothing: A little  Toileting, which includes using toilet, bedpan or urinal: A little  Taking care of personal grooming such as brushing teeth: A little  Eating Meals: A little  Daily Activity - Total Score: 18    EDUCATION:  Education  Individual(s) Educated: Patient  Education Provided:  (safety)  Education Comment: Pt did not have carry over with education provided  Education Documentation  Body Mechanics, taught by Shana Costa OT at 7/8/2024  1:59 PM.  Learner: Patient  Readiness: Acceptance  Method: Explanation  Response: Verbalizes Understanding    Precautions, taught by Shana Costa OT at 7/8/2024  1:59 PM.  Learner: Patient  Readiness: Acceptance  Method: Explanation  Response: Verbalizes Understanding    Education Comments  No comments found.        Goals:   Encounter Problems       Encounter Problems (Active)       OT Goals       OT Goal 1 (Progressing)       Start:  07/08/24    Expected End:  07/22/24       Pt will complete all bed mobility independently safely            OT Goal 2 (Progressing)       Start:  07/08/24    Expected End:   07/22/24       Pt will complete ADL's and mobility with good sit balance and fair+ stand balance            OT Goal 3 (Progressing)       Start:  07/08/24    Expected End:  07/22/24       Pt with complete all transfers safely with SBA             OT Goal 4 (Progressing)       Start:  07/08/24    Expected End:  07/22/24       Pt will complete LB dressing ADL's with SBA using adaptive device(s) as needed           OT Goal 5 (Progressing)       Start:  07/08/24    Expected End:  07/22/24       Pt with complete grooming ADL's with supervision and good stand balance                 Treatment: Pt was able to complete bed mobility with SBA. Pt with fair+ sit balance on EOB. Pt completed all transfers with Min A. Pt ambulated in room/olvera with Min HHA (no DME). Pt stood at sink with joe stand balance to complete grooming ADL's. Pt returned to and remained in bedside chair with chair alarm on and call light within reach.
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
## 2024-07-08 NOTE — CARE PLAN
Problem: Pain - Adult  Goal: Verbalizes/displays adequate comfort level or baseline comfort level  Outcome: Progressing     Problem: Safety - Adult  Goal: Free from fall injury  Outcome: Progressing     Problem: Discharge Planning  Goal: Discharge to home or other facility with appropriate resources  Outcome: Progressing     Problem: Chronic Conditions and Co-morbidities  Goal: Patient's chronic conditions and co-morbidity symptoms are monitored and maintained or improved  Outcome: Progressing     Problem: Cognitive Loss/Dementia  Goal: I will return to previous cognitive status  Outcome: Progressing   The patient's goals for the shift include      The clinical goals for the shift include Pt will remain free from fall/injury

## 2024-07-09 VITALS
DIASTOLIC BLOOD PRESSURE: 73 MMHG | HEART RATE: 96 BPM | RESPIRATION RATE: 16 BRPM | WEIGHT: 134 LBS | HEIGHT: 71 IN | BODY MASS INDEX: 18.76 KG/M2 | SYSTOLIC BLOOD PRESSURE: 124 MMHG | TEMPERATURE: 98.1 F | OXYGEN SATURATION: 96 %

## 2024-07-09 PROCEDURE — 2500000001 HC RX 250 WO HCPCS SELF ADMINISTERED DRUGS (ALT 637 FOR MEDICARE OP): Performed by: INTERNAL MEDICINE

## 2024-07-09 PROCEDURE — 99239 HOSP IP/OBS DSCHRG MGMT >30: CPT | Performed by: INTERNAL MEDICINE

## 2024-07-09 PROCEDURE — G0378 HOSPITAL OBSERVATION PER HR: HCPCS

## 2024-07-09 RX ADMIN — ALLOPURINOL 100 MG: 100 TABLET ORAL at 09:04

## 2024-07-09 RX ADMIN — PANTOPRAZOLE SODIUM 40 MG: 40 TABLET, DELAYED RELEASE ORAL at 06:49

## 2024-07-09 RX ADMIN — CALCITRIOL CAPSULES 0.25 MCG 0.25 MCG: 0.25 CAPSULE ORAL at 09:04

## 2024-07-09 ASSESSMENT — COGNITIVE AND FUNCTIONAL STATUS - GENERAL
PERSONAL GROOMING: A LITTLE
DRESSING REGULAR LOWER BODY CLOTHING: A LITTLE
MOVING TO AND FROM BED TO CHAIR: A LITTLE
STANDING UP FROM CHAIR USING ARMS: A LITTLE
DAILY ACTIVITIY SCORE: 18
TURNING FROM BACK TO SIDE WHILE IN FLAT BAD: A LITTLE
MOVING FROM LYING ON BACK TO SITTING ON SIDE OF FLAT BED WITH BEDRAILS: A LITTLE
HELP NEEDED FOR BATHING: A LITTLE
EATING MEALS: A LITTLE
TOILETING: A LITTLE
CLIMB 3 TO 5 STEPS WITH RAILING: A LITTLE
MOBILITY SCORE: 18
WALKING IN HOSPITAL ROOM: A LITTLE
DRESSING REGULAR UPPER BODY CLOTHING: A LITTLE

## 2024-07-09 ASSESSMENT — PAIN SCALES - GENERAL: PAINLEVEL_OUTOF10: 0 - NO PAIN

## 2024-07-09 NOTE — PROGRESS NOTES
[facility] is able to accept pt today for respite.  Met with pt's POA/significant other who confirms this plan.  Transportation arranged for 11:30am; facility, bedside RN, TCC, pt's POA and pt's son (Herbert via voicemail) all aware.  [place], MAR, AVS and dc summary sent through [facility].

## 2024-07-09 NOTE — CARE PLAN
The patient's goals for the shift include      The clinical goals for the shift include no falls/able to get some rest

## 2024-07-09 NOTE — PROGRESS NOTES
Slim Saldivar is a 89 y.o. male on day 0 of admission presenting with AMS (altered mental status).      Subjective   Patient is calm and cooperative.  Case management and  working on placement    Objective     Last Recorded Vitals  /73 (BP Location: Right arm, Patient Position: Lying)   Pulse 96   Temp 36.7 °C (98.1 °F) (Temporal)   Resp 16   Wt 60.8 kg (134 lb)   SpO2 96%   Intake/Output last 3 Shifts:    Intake/Output Summary (Last 24 hours) at 7/9/2024 1207  Last data filed at 7/9/2024 0856  Gross per 24 hour   Intake 850 ml   Output --   Net 850 ml       Admission Weight  Weight: 68 kg (150 lb) (07/06/24 1752)    Daily Weight  07/06/24 : 60.8 kg (134 lb)      Physical Exam:  General: Not in acute distress, alert.  Disheveled  HEENT: PERRLA, head intact and normocephalic  Neck: Normal to inspection  Lungs: Clear to auscultation, work of breathing within normal limit  Cardiac: Regular rate and rhythm  Abdomen: Soft nontender, positive bowel sounds  : Exam deferred  Skin: Intact  Hematology: No petechia or excessive ecchymosis  Musculoskeletal: Without significant trauma  Neurological: Alert awake oriented, no focal deficit, cranial nerves grossly intact  Psych: Calm and cooperative    Relevant Results  Scheduled medications  allopurinol, 100 mg, oral, Daily  calcitriol, 0.25 mcg, oral, Daily  donepezil, 10 mg, oral, Nightly  pantoprazole, 40 mg, oral, Daily before breakfast  QUEtiapine, 25 mg, oral, Nightly      Continuous medications     PRN medications  PRN medications: acetaminophen **OR** acetaminophen **OR** acetaminophen, acetaminophen **OR** acetaminophen **OR** acetaminophen, melatonin, ondansetron ODT **OR** ondansetron   Labs  Results from last 7 days   Lab Units 07/08/24  0531 07/07/24  0638 07/06/24  1807   WBC AUTO x10*3/uL 6.2 6.2 5.4   HEMOGLOBIN g/dL 14.3 14.5 14.7   HEMATOCRIT % 43.4 43.9 43.0   PLATELETS AUTO x10*3/uL 190 198 184     Results from last 7 days   Lab Units  07/08/24  0531 07/07/24  0638 07/06/24  1807   SODIUM mmol/L 140 139 138   POTASSIUM mmol/L 3.5 3.9 3.8   CHLORIDE mmol/L 105 105 104   CO2 mmol/L 26 27 25   BUN mg/dL 22 24* 21   CREATININE mg/dL 1.39* 1.41* 1.44*   CALCIUM mg/dL 9.2 9.0 9.3   PROTEIN TOTAL g/dL 5.9* 5.8* 6.1*   BILIRUBIN TOTAL mg/dL 0.9 0.9 1.1   ALK PHOS U/L 76 76 75   ALT U/L 9* 10 9*   AST U/L 13 16 15   GLUCOSE mg/dL 94 93 91       CT head wo IV contrast    Result Date: 7/6/2024  Interpreted By:  Huyen Liu, STUDY: CT HEAD WO IV CONTRAST;  7/6/2024 7:59 pm   INDICATION: Signs/Symptoms:altered mental status.   COMPARISON: None.   ACCESSION NUMBER(S): [id number]   ORDERING CLINICIAN: CODY ARCEO   TECHNIQUE: Noncontrast axial CT scan of head was performed. Angled reformats in brain and bone windows were generated. The images were reviewed in bone, brain, blood and soft tissue windows.   FINDINGS:   The ventricles, cisterns and sulci are prominent, consistent with mild diffuse volume loss. There are areas of nonspecific white matter hypodensity, which are probably age-related or microvascular in nature.   Gray-white differentiation is intact and there is no evidence of acute cortical infarct. No mass, mass effect or midline shift is seen. There is no evidence of hemorrhage.   The visualized paranasal sinuses are clear. Nasal septal deviation to the right.       No evidence of acute cortical infarct or intracranial hemorrhage. Chronic changes as described. .   MACRO: None   Signed by: Huyen Liu 7/6/2024 8:21 PM Dictation workstation:   [id number]    XR chest 1 view    Result Date: 7/6/2024  Interpreted By:  Brendan Fonseca, STUDY: XR CHEST 1 VIEW;  7/6/2024 6:03 pm   INDICATION: Signs/Symptoms:Chest Pain.   COMPARISON: 10/03/2020   ACCESSION NUMBER(S): [id number]   ORDERING CLINICIAN: CODY ARCEO   FINDINGS:         CARDIOMEDIASTINAL SILHOUETTE: Cardiomediastinal silhouette is normal in size and configuration.   LUNGS: The lungs are  hyperinflated but clear. No consolidation or effusion   ABDOMEN: No remarkable upper abdominal findings.   BONES: No acute osseous changes.       Hyperinflated lungs. No evidence of acute cardiopulmonary process.     MACRO: None   Signed by: Brendan Fonseca 7/6/2024 6:05 PM Dictation workstation:   [id number]          Assessment/Plan   Slim Saldivar is a 89 y.o. male on day 0 of admission presenting with AMS (altered mental status).  Principal Problem:    AMS (altered mental status)  Active Problems:    Dementia with aggressive behavior (Multi)    Stage 3b chronic kidney disease (Multi)    Supratherapeutic INR      89-year-old male with past medical history of dementia, CKD, atrial fibrillation on Coumadin, hyperlipidemia, mitral valve disorder, hypertension presented to emergency department for failure to thrive with agitation and inability to care for himself.    Agitation/altered mental status  Resolved  Patient is calm and cooperative and doing well  Could be sundowning at nighttime  Will monitor closely  As needed Seroquel ordered and scheduled Seroquel    Dementia  Continue with Aricept  Received Depakote one-time dose last night  Will monitor  PT OT evaluation    Failure to thrive  Could consider Remeron if patient has lack of appetite  PT OT evaluation    Gout  Continue with allopurinol    Atrial fibrillation  Hold Coumadin tonight and resume from 7/10/2024  Telemetry    CKD  Creatinine close to baseline  Off IV fluids  Patient is eating and drinking  Small amount of hematuria on urinalysis which is likely secondary to supratherapeutic INR    DVT prophylaxis  SCDs  INR is supratherapeutic       Plan discussed with patient at bedside along with primary nurse and   Low level of MDM based on above issue and discussing plan    This note is created using voice recognition software. All efforts are made to minimize errors, if there are errors there due to transcription.    Alex  Claritza  Hospitalist

## 2024-07-09 NOTE — CARE PLAN
The patient's goals for the shift include      The clinical goals for the shift include no falls/able to get some rest    Problem: Fall/Injury  Goal: Not fall by end of shift  Outcome: Progressing  Goal: Be free from injury by end of the shift  Outcome: Progressing  Goal: Verbalize understanding of personal risk factors for fall in the hospital  Outcome: Progressing  Goal: Verbalize understanding of risk factor reduction measures to prevent injury from fall in the home  Outcome: Progressing  Goal: Use assistive devices by end of the shift  Outcome: Progressing     Problem: Skin  Goal: Participates in plan/prevention/treatment measures  Outcome: Progressing  Goal: Prevent/manage excess moisture  Outcome: Progressing  Goal: Prevent/minimize sheer/friction injuries  Outcome: Progressing  Goal: Promote/optimize nutrition  Outcome: Progressing

## 2024-07-15 ENCOUNTER — APPOINTMENT (OUTPATIENT)
Dept: PRIMARY CARE | Facility: CLINIC | Age: 89
End: 2024-07-15
Payer: MEDICARE

## 2024-07-15 DIAGNOSIS — F03.918 DEMENTIA WITH AGGRESSIVE BEHAVIOR (MULTI): Primary | ICD-10-CM

## 2024-07-15 DIAGNOSIS — N18.32 STAGE 3B CHRONIC KIDNEY DISEASE (MULTI): ICD-10-CM

## 2024-07-15 PROCEDURE — 1157F ADVNC CARE PLAN IN RCRD: CPT | Performed by: FAMILY MEDICINE

## 2024-07-15 PROCEDURE — 1036F TOBACCO NON-USER: CPT | Performed by: FAMILY MEDICINE

## 2024-07-15 PROCEDURE — 1159F MED LIST DOCD IN RCRD: CPT | Performed by: FAMILY MEDICINE

## 2024-07-15 PROCEDURE — 99443 PR PHYS/QHP TELEPHONE EVALUATION 21-30 MIN: CPT | Performed by: FAMILY MEDICINE

## 2024-07-15 ASSESSMENT — PATIENT HEALTH QUESTIONNAIRE - PHQ9
1. LITTLE INTEREST OR PLEASURE IN DOING THINGS: NOT AT ALL
SUM OF ALL RESPONSES TO PHQ9 QUESTIONS 1 AND 2: 0
2. FEELING DOWN, DEPRESSED OR HOPELESS: NOT AT ALL

## 2024-07-15 NOTE — PROGRESS NOTES
TCM   Form needs filled out - on desk   #[phone] - yasmin bright, daughter   Sundowning around 4pm - pacing, nervous, shakes, anxiety

## 2024-07-15 NOTE — PROGRESS NOTES
Virtual or Telephone Consent    A telephone visit (audio only) between the patient (at the originating site) and the provider (at the distant site) was utilized to provide this telehealth service.   Verbal consent was requested and obtained from Slim Saldivar as well as daughter Lynda on this date, 07/15/24 for a telehealth visit.   This is a follow-up call from discharge as secondary to dementia and getting ready go into by [facility].  We do have health evaluation forms patient to go into assisted living care help with bathing as well as also with medications.  Patient has been doing well is having some issues with sundowning around 4 PM like the Seroquel to take at that time.  Otherwise she has been doing well.  Has signed DNR CC paperwork and it is noted in the chart with his evaluation to the assisted living.    Assessment per EMR    Plan: 22 minutes on the phone as well as coordination of care review of chart production and paperwork.  Daughter will  paperwork tomorrow.  Answered all other questions and concerns

## 2024-07-18 ENCOUNTER — PATIENT OUTREACH (OUTPATIENT)
Dept: PRIMARY CARE | Facility: CLINIC | Age: 89
End: 2024-07-18
Payer: MEDICARE

## 2024-07-18 ENCOUNTER — DOCUMENTATION (OUTPATIENT)
Dept: PRIMARY CARE | Facility: CLINIC | Age: 89
End: 2024-07-18
Payer: MEDICARE

## 2024-07-18 NOTE — PROGRESS NOTES
CM will not enroll in TCM program at this time. Was discharged from [facility] memory unit yesterday where he was in respite care to assisted living. Patient had virtual appt with Dr Mcfarland on 7/15/24.    Keystone Flap Text: The defect edges were debeveled with a #15 scalpel blade.  Given the location of the defect, shape of the defect a keystone flap was deemed most appropriate.  Using a sterile surgical marker, an appropriate keystone flap was drawn incorporating the defect, outlining the appropriate donor tissue and placing the expected incisions within the relaxed skin tension lines where possible. The area thus outlined was incised deep to adipose tissue with a #15 scalpel blade.  The skin margins were undermined to an appropriate distance in all directions around the primary defect and laterally outward around the flap utilizing iris scissors.

## 2024-07-24 LAB
ATRIAL RATE: 75 BPM
ATRIAL RATE: 92 BPM
Q ONSET: 222 MS
Q ONSET: 224 MS
QRS COUNT: 12 BEATS
QRS COUNT: 13 BEATS
QRS DURATION: 72 MS
QRS DURATION: 80 MS
QT INTERVAL: 384 MS
QT INTERVAL: 392 MS
QTC CALCULATION(BAZETT): 432 MS
QTC CALCULATION(BAZETT): 446 MS
QTC FREDERICIA: 415 MS
QTC FREDERICIA: 427 MS
R AXIS: 51 DEGREES
R AXIS: 67 DEGREES
T AXIS: 56 DEGREES
T AXIS: 65 DEGREES
T OFFSET: 416 MS
T OFFSET: 418 MS
VENTRICULAR RATE: 76 BPM
VENTRICULAR RATE: 78 BPM

## 2024-08-08 ENCOUNTER — APPOINTMENT (OUTPATIENT)
Dept: NEUROLOGY | Facility: CLINIC | Age: 89
End: 2024-08-08
Payer: MEDICARE

## 2024-08-25 PROBLEM — E55.9 VITAMIN D DEFICIENCY: Status: ACTIVE | Noted: 2019-02-28

## 2024-08-25 PROBLEM — N25.81 SECONDARY HYPERPARATHYROIDISM OF RENAL ORIGIN (MULTI): Status: RESOLVED | Noted: 2024-02-26 | Resolved: 2024-08-25

## 2024-08-25 PROBLEM — N25.81 HYPERPARATHYROIDISM, SECONDARY (MULTI): Status: ACTIVE | Noted: 2024-08-25

## 2024-08-25 PROBLEM — D64.9 ANEMIA: Status: ACTIVE | Noted: 2024-08-25

## 2024-08-25 PROBLEM — Z00.00 MEDICARE ANNUAL WELLNESS VISIT, SUBSEQUENT: Status: RESOLVED | Noted: 2024-02-26 | Resolved: 2024-08-25

## 2024-08-25 PROBLEM — I05.9 MITRAL VALVE DISORDER: Status: ACTIVE | Noted: 2024-08-25

## 2024-08-26 ENCOUNTER — APPOINTMENT (OUTPATIENT)
Dept: PRIMARY CARE | Facility: CLINIC | Age: 89
End: 2024-08-26
Payer: MEDICARE

## 2024-09-10 ENCOUNTER — APPOINTMENT (OUTPATIENT)
Dept: NEPHROLOGY | Facility: CLINIC | Age: 89
End: 2024-09-10
Payer: MEDICARE

## 2024-09-26 ENCOUNTER — APPOINTMENT (OUTPATIENT)
Dept: NEUROLOGY | Facility: CLINIC | Age: 89
End: 2024-09-26
Payer: MEDICARE

## 2024-10-28 ENCOUNTER — APPOINTMENT (OUTPATIENT)
Dept: RADIOLOGY | Facility: HOSPITAL | Age: 89
End: 2024-10-28
Payer: MEDICARE

## 2024-10-28 ENCOUNTER — APPOINTMENT (OUTPATIENT)
Dept: CARDIOLOGY | Facility: HOSPITAL | Age: 89
End: 2024-10-28
Payer: MEDICARE

## 2024-10-28 ENCOUNTER — HOSPITAL ENCOUNTER (INPATIENT)
Facility: HOSPITAL | Age: 89
End: 2024-10-28
Attending: EMERGENCY MEDICINE | Admitting: INTERNAL MEDICINE
Payer: MEDICARE

## 2024-10-28 DIAGNOSIS — R79.1 SUPRATHERAPEUTIC INR: ICD-10-CM
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
## 2024-10-28 DIAGNOSIS — R33.9 URINARY RETENTION: ICD-10-CM

## 2024-10-28 DIAGNOSIS — N17.9 AKI (ACUTE KIDNEY INJURY) (CMS-HCC): Primary | ICD-10-CM

## 2024-10-28 DIAGNOSIS — I48.91 ATRIAL FIBRILLATION, UNSPECIFIED TYPE (MULTI): ICD-10-CM

## 2024-10-28 LAB
ALBUMIN SERPL BCP-MCNC: 3.8 G/DL (ref 3.4–5)
ALP SERPL-CCNC: 64 U/L (ref 33–136)
ALT SERPL W P-5'-P-CCNC: 12 U/L (ref 10–52)
ANION GAP SERPL CALC-SCNC: 15 MMOL/L (ref 10–20)
APPEARANCE UR: ABNORMAL
AST SERPL W P-5'-P-CCNC: 20 U/L (ref 9–39)
BASOPHILS # BLD AUTO: 0.03 X10*3/UL (ref 0–0.1)
BASOPHILS NFR BLD AUTO: 0.5 %
BILIRUB SERPL-MCNC: 0.7 MG/DL (ref 0–1.2)
BILIRUB UR STRIP.AUTO-MCNC: NEGATIVE MG/DL
BUN SERPL-MCNC: 33 MG/DL (ref 6–23)
CALCIUM SERPL-MCNC: 8.8 MG/DL (ref 8.6–10.3)
CARDIAC TROPONIN I PNL SERPL HS: 19 NG/L (ref 0–20)
CHLORIDE SERPL-SCNC: 102 MMOL/L (ref 98–107)
CO2 SERPL-SCNC: 26 MMOL/L (ref 21–32)
COLOR UR: YELLOW
CREAT SERPL-MCNC: 2.99 MG/DL (ref 0.5–1.3)
EGFRCR SERPLBLD CKD-EPI 2021: 19 ML/MIN/1.73M*2
EOSINOPHIL # BLD AUTO: 0.2 X10*3/UL (ref 0–0.4)
EOSINOPHIL NFR BLD AUTO: 3 %
ERYTHROCYTE [DISTWIDTH] IN BLOOD BY AUTOMATED COUNT: 14.4 % (ref 11.5–14.5)
GLUCOSE SERPL-MCNC: 86 MG/DL (ref 74–99)
GLUCOSE UR STRIP.AUTO-MCNC: NEGATIVE MG/DL
HCT VFR BLD AUTO: 39.3 % (ref 41–52)
HGB BLD-MCNC: 13 G/DL (ref 13.5–17.5)
IMM GRANULOCYTES # BLD AUTO: 0.03 X10*3/UL (ref 0–0.5)
IMM GRANULOCYTES NFR BLD AUTO: 0.5 % (ref 0–0.9)
INR PPP: 4.1 (ref 0.9–1.1)
KETONES UR STRIP.AUTO-MCNC: NEGATIVE MG/DL
LEUKOCYTE ESTERASE UR QL STRIP.AUTO: ABNORMAL
LYMPHOCYTES # BLD AUTO: 1.66 X10*3/UL (ref 0.8–3)
LYMPHOCYTES NFR BLD AUTO: 25.2 %
MAGNESIUM SERPL-MCNC: 1.75 MG/DL (ref 1.6–2.4)
MCH RBC QN AUTO: 31 PG (ref 26–34)
MCHC RBC AUTO-ENTMCNC: 33.1 G/DL (ref 32–36)
MCV RBC AUTO: 94 FL (ref 80–100)
MONOCYTES # BLD AUTO: 0.8 X10*3/UL (ref 0.05–0.8)
MONOCYTES NFR BLD AUTO: 12.1 %
NEUTROPHILS # BLD AUTO: 3.87 X10*3/UL (ref 1.6–5.5)
NEUTROPHILS NFR BLD AUTO: 58.7 %
NITRITE UR QL STRIP.AUTO: NEGATIVE
NRBC BLD-RTO: 0 /100 WBCS (ref 0–0)
PH UR STRIP.AUTO: 5 [PH]
PLATELET # BLD AUTO: 201 X10*3/UL (ref 150–450)
POTASSIUM SERPL-SCNC: 4 MMOL/L (ref 3.5–5.3)
PROT SERPL-MCNC: 6.4 G/DL (ref 6.4–8.2)
PROT UR STRIP.AUTO-MCNC: NEGATIVE MG/DL
PROTHROMBIN TIME: 47.2 SECONDS (ref 9.8–12.8)
RBC # BLD AUTO: 4.2 X10*6/UL (ref 4.5–5.9)
RBC # UR STRIP.AUTO: NEGATIVE /UL
RBC #/AREA URNS AUTO: ABNORMAL /HPF
SODIUM SERPL-SCNC: 139 MMOL/L (ref 136–145)
SP GR UR STRIP.AUTO: 1.02
SQUAMOUS #/AREA URNS AUTO: ABNORMAL /HPF
UROBILINOGEN UR STRIP.AUTO-MCNC: ABNORMAL MG/DL
WBC # BLD AUTO: 6.6 X10*3/UL (ref 4.4–11.3)
WBC #/AREA URNS AUTO: ABNORMAL /HPF

## 2024-10-28 PROCEDURE — 70450 CT HEAD/BRAIN W/O DYE: CPT | Performed by: RADIOLOGY

## 2024-10-28 PROCEDURE — 72125 CT NECK SPINE W/O DYE: CPT | Performed by: RADIOLOGY

## 2024-10-28 PROCEDURE — 99285 EMERGENCY DEPT VISIT HI MDM: CPT | Performed by: EMERGENCY MEDICINE

## 2024-10-28 PROCEDURE — 71045 X-RAY EXAM CHEST 1 VIEW: CPT

## 2024-10-28 PROCEDURE — 85610 PROTHROMBIN TIME: CPT | Performed by: EMERGENCY MEDICINE

## 2024-10-28 PROCEDURE — 72125 CT NECK SPINE W/O DYE: CPT

## 2024-10-28 PROCEDURE — 85025 COMPLETE CBC W/AUTO DIFF WBC: CPT | Performed by: EMERGENCY MEDICINE

## 2024-10-28 PROCEDURE — 36415 COLL VENOUS BLD VENIPUNCTURE: CPT | Performed by: EMERGENCY MEDICINE

## 2024-10-28 PROCEDURE — 93005 ELECTROCARDIOGRAM TRACING: CPT

## 2024-10-28 PROCEDURE — 83735 ASSAY OF MAGNESIUM: CPT | Performed by: EMERGENCY MEDICINE

## 2024-10-28 PROCEDURE — 80053 COMPREHEN METABOLIC PANEL: CPT | Performed by: EMERGENCY MEDICINE

## 2024-10-28 PROCEDURE — 81001 URINALYSIS AUTO W/SCOPE: CPT | Performed by: EMERGENCY MEDICINE

## 2024-10-28 PROCEDURE — 70450 CT HEAD/BRAIN W/O DYE: CPT

## 2024-10-28 PROCEDURE — 71045 X-RAY EXAM CHEST 1 VIEW: CPT | Performed by: RADIOLOGY

## 2024-10-28 PROCEDURE — 99223 1ST HOSP IP/OBS HIGH 75: CPT | Performed by: INTERNAL MEDICINE

## 2024-10-28 PROCEDURE — 84484 ASSAY OF TROPONIN QUANT: CPT | Performed by: EMERGENCY MEDICINE

## 2024-10-28 PROCEDURE — 87086 URINE CULTURE/COLONY COUNT: CPT | Mod: STJLAB | Performed by: EMERGENCY MEDICINE

## 2024-10-28 PROCEDURE — 93010 ELECTROCARDIOGRAM REPORT: CPT | Performed by: EMERGENCY MEDICINE

## 2024-10-28 PROCEDURE — 99285 EMERGENCY DEPT VISIT HI MDM: CPT

## 2024-10-28 RX ORDER — ACETAMINOPHEN 650 MG/1
650 SUPPOSITORY RECTAL EVERY 4 HOURS PRN
Status: DISCONTINUED | OUTPATIENT
Start: 2024-10-28 | End: 2024-11-08 | Stop reason: HOSPADM

## 2024-10-28 RX ORDER — ONDANSETRON 4 MG/1
4 TABLET, ORALLY DISINTEGRATING ORAL EVERY 8 HOURS PRN
Status: DISCONTINUED | OUTPATIENT
Start: 2024-10-28 | End: 2024-11-08 | Stop reason: HOSPADM

## 2024-10-28 RX ORDER — TALC
3 POWDER (GRAM) TOPICAL NIGHTLY PRN
Status: DISCONTINUED | OUTPATIENT
Start: 2024-10-28 | End: 2024-11-08 | Stop reason: HOSPADM

## 2024-10-28 RX ORDER — ONDANSETRON HYDROCHLORIDE 2 MG/ML
4 INJECTION, SOLUTION INTRAVENOUS EVERY 8 HOURS PRN
Status: DISCONTINUED | OUTPATIENT
Start: 2024-10-28 | End: 2024-11-08 | Stop reason: HOSPADM

## 2024-10-28 RX ORDER — METOCLOPRAMIDE HYDROCHLORIDE 5 MG/ML
5 INJECTION INTRAMUSCULAR; INTRAVENOUS EVERY 6 HOURS PRN
Status: DISCONTINUED | OUTPATIENT
Start: 2024-10-28 | End: 2024-10-29

## 2024-10-28 RX ORDER — ACETAMINOPHEN 160 MG/5ML
650 SOLUTION ORAL EVERY 4 HOURS PRN
Status: DISCONTINUED | OUTPATIENT
Start: 2024-10-28 | End: 2024-11-08 | Stop reason: HOSPADM

## 2024-10-28 RX ORDER — ACETAMINOPHEN 325 MG/1
650 TABLET ORAL EVERY 4 HOURS PRN
Status: DISCONTINUED | OUTPATIENT
Start: 2024-10-28 | End: 2024-11-08 | Stop reason: HOSPADM

## 2024-10-28 RX ORDER — METOCLOPRAMIDE 10 MG/1
5 TABLET ORAL EVERY 6 HOURS PRN
Status: DISCONTINUED | OUTPATIENT
Start: 2024-10-28 | End: 2024-10-29

## 2024-10-28 RX ORDER — LORAZEPAM 0.5 MG/1
0.25 TABLET ORAL ONCE
Status: DISCONTINUED | OUTPATIENT
Start: 2024-10-28 | End: 2024-10-29

## 2024-10-28 RX ORDER — PANTOPRAZOLE SODIUM 40 MG/10ML
40 INJECTION, POWDER, LYOPHILIZED, FOR SOLUTION INTRAVENOUS
Status: DISCONTINUED | OUTPATIENT
Start: 2024-10-29 | End: 2024-11-08 | Stop reason: HOSPADM

## 2024-10-28 RX ORDER — PANTOPRAZOLE SODIUM 40 MG/1
40 TABLET, DELAYED RELEASE ORAL
Status: DISCONTINUED | OUTPATIENT
Start: 2024-10-29 | End: 2024-11-08 | Stop reason: HOSPADM

## 2024-10-28 ASSESSMENT — PAIN - FUNCTIONAL ASSESSMENT
PAIN_FUNCTIONAL_ASSESSMENT: 0-10
PAIN_FUNCTIONAL_ASSESSMENT: 0-10

## 2024-10-28 ASSESSMENT — LIFESTYLE VARIABLES
EVER HAD A DRINK FIRST THING IN THE MORNING TO STEADY YOUR NERVES TO GET RID OF A HANGOVER: NO
HAVE PEOPLE ANNOYED YOU BY CRITICIZING YOUR DRINKING: NO
TOTAL SCORE: 0
EVER FELT BAD OR GUILTY ABOUT YOUR DRINKING: NO
HAVE YOU EVER FELT YOU SHOULD CUT DOWN ON YOUR DRINKING: NO

## 2024-10-28 ASSESSMENT — COLUMBIA-SUICIDE SEVERITY RATING SCALE - C-SSRS
2. HAVE YOU ACTUALLY HAD ANY THOUGHTS OF KILLING YOURSELF?: NO
1. IN THE PAST MONTH, HAVE YOU WISHED YOU WERE DEAD OR WISHED YOU COULD GO TO SLEEP AND NOT WAKE UP?: NO
6. HAVE YOU EVER DONE ANYTHING, STARTED TO DO ANYTHING, OR PREPARED TO DO ANYTHING TO END YOUR LIFE?: NO

## 2024-10-28 ASSESSMENT — PAIN SCALES - GENERAL
PAINLEVEL_OUTOF10: 0 - NO PAIN
PAINLEVEL_OUTOF10: 0 - NO PAIN

## 2024-10-29 ENCOUNTER — APPOINTMENT (OUTPATIENT)
Dept: RADIOLOGY | Facility: HOSPITAL | Age: 89
End: 2024-10-29
Payer: MEDICARE
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
## 2024-10-29 PROBLEM — N30.00 ACUTE CYSTITIS: Status: ACTIVE | Noted: 2024-10-29

## 2024-10-29 LAB
ALBUMIN SERPL BCP-MCNC: 3.1 G/DL (ref 3.4–5)
ALP SERPL-CCNC: 53 U/L (ref 33–136)
ALT SERPL W P-5'-P-CCNC: 9 U/L (ref 10–52)
ANION GAP SERPL CALC-SCNC: 14 MMOL/L (ref 10–20)
APTT PPP: 50 SECONDS (ref 27–38)
AST SERPL W P-5'-P-CCNC: 16 U/L (ref 9–39)
BILIRUB SERPL-MCNC: 0.5 MG/DL (ref 0–1.2)
BUN SERPL-MCNC: 31 MG/DL (ref 6–23)
CALCIUM SERPL-MCNC: 8.3 MG/DL (ref 8.6–10.3)
CHLORIDE SERPL-SCNC: 106 MMOL/L (ref 98–107)
CO2 SERPL-SCNC: 25 MMOL/L (ref 21–32)
CREAT SERPL-MCNC: 2.59 MG/DL (ref 0.5–1.3)
CREAT UR-MCNC: 58.5 MG/DL (ref 20–370)
CREAT UR-MCNC: 58.5 MG/DL (ref 20–370)
EGFRCR SERPLBLD CKD-EPI 2021: 23 ML/MIN/1.73M*2
ERYTHROCYTE [DISTWIDTH] IN BLOOD BY AUTOMATED COUNT: 14.4 % (ref 11.5–14.5)
GLUCOSE SERPL-MCNC: 102 MG/DL (ref 74–99)
HCT VFR BLD AUTO: 34.3 % (ref 41–52)
HGB BLD-MCNC: 10.6 G/DL (ref 13.5–17.5)
HOLD SPECIMEN: NORMAL
INR PPP: 4.1 (ref 0.9–1.1)
MCH RBC QN AUTO: 30.1 PG (ref 26–34)
MCHC RBC AUTO-ENTMCNC: 30.9 G/DL (ref 32–36)
MCV RBC AUTO: 97 FL (ref 80–100)
NRBC BLD-RTO: 0 /100 WBCS (ref 0–0)
PLATELET # BLD AUTO: 154 X10*3/UL (ref 150–450)
POTASSIUM SERPL-SCNC: 3.8 MMOL/L (ref 3.5–5.3)
PROT SERPL-MCNC: 5.2 G/DL (ref 6.4–8.2)
PROTHROMBIN TIME: 46.9 SECONDS (ref 9.8–12.8)
RBC # BLD AUTO: 3.52 X10*6/UL (ref 4.5–5.9)
SODIUM SERPL-SCNC: 141 MMOL/L (ref 136–145)
SODIUM UR-SCNC: 71 MMOL/L
SODIUM/CREAT UR-RTO: 121 MMOL/G CREAT
UREA/CREAT UR-SRTO: 5.2 G/G CREAT
UUN UR-MCNC: 304 MG/DL
WBC # BLD AUTO: 7.9 X10*3/UL (ref 4.4–11.3)

## 2024-10-29 PROCEDURE — 84540 ASSAY OF URINE/UREA-N: CPT | Performed by: INTERNAL MEDICINE

## 2024-10-29 PROCEDURE — 51701 INSERT BLADDER CATHETER: CPT

## 2024-10-29 PROCEDURE — 2500000001 HC RX 250 WO HCPCS SELF ADMINISTERED DRUGS (ALT 637 FOR MEDICARE OP): Performed by: INTERNAL MEDICINE

## 2024-10-29 PROCEDURE — 2500000004 HC RX 250 GENERAL PHARMACY W/ HCPCS (ALT 636 FOR OP/ED): Performed by: INTERNAL MEDICINE

## 2024-10-29 PROCEDURE — 99233 SBSQ HOSP IP/OBS HIGH 50: CPT | Performed by: INTERNAL MEDICINE

## 2024-10-29 PROCEDURE — 85027 COMPLETE CBC AUTOMATED: CPT | Performed by: INTERNAL MEDICINE

## 2024-10-29 PROCEDURE — 76770 US EXAM ABDO BACK WALL COMP: CPT

## 2024-10-29 PROCEDURE — 2500000004 HC RX 250 GENERAL PHARMACY W/ HCPCS (ALT 636 FOR OP/ED): Performed by: EMERGENCY MEDICINE

## 2024-10-29 PROCEDURE — 36415 COLL VENOUS BLD VENIPUNCTURE: CPT | Performed by: INTERNAL MEDICINE

## 2024-10-29 PROCEDURE — 1100000001 HC PRIVATE ROOM DAILY

## 2024-10-29 PROCEDURE — 2500000002 HC RX 250 W HCPCS SELF ADMINISTERED DRUGS (ALT 637 FOR MEDICARE OP, ALT 636 FOR OP/ED): Performed by: INTERNAL MEDICINE

## 2024-10-29 PROCEDURE — 99222 1ST HOSP IP/OBS MODERATE 55: CPT | Performed by: UROLOGY

## 2024-10-29 PROCEDURE — 76770 US EXAM ABDO BACK WALL COMP: CPT | Performed by: RADIOLOGY

## 2024-10-29 PROCEDURE — 96372 THER/PROPH/DIAG INJ SC/IM: CPT | Performed by: EMERGENCY MEDICINE

## 2024-10-29 PROCEDURE — 84300 ASSAY OF URINE SODIUM: CPT | Performed by: INTERNAL MEDICINE

## 2024-10-29 PROCEDURE — 99221 1ST HOSP IP/OBS SF/LOW 40: CPT | Performed by: NURSE PRACTITIONER

## 2024-10-29 PROCEDURE — 80053 COMPREHEN METABOLIC PANEL: CPT | Performed by: INTERNAL MEDICINE

## 2024-10-29 PROCEDURE — 96372 THER/PROPH/DIAG INJ SC/IM: CPT | Performed by: INTERNAL MEDICINE

## 2024-10-29 PROCEDURE — 85610 PROTHROMBIN TIME: CPT | Performed by: INTERNAL MEDICINE

## 2024-10-29 RX ORDER — LORAZEPAM 2 MG/ML
0.5 INJECTION INTRAMUSCULAR ONCE
Status: COMPLETED | OUTPATIENT
Start: 2024-10-29 | End: 2024-10-29

## 2024-10-29 RX ORDER — DIVALPROEX SODIUM 125 MG/1
250 CAPSULE, COATED PELLETS ORAL 2 TIMES DAILY
COMMUNITY
Start: 2024-10-10

## 2024-10-29 RX ORDER — WARFARIN 4 MG/1
4 TABLET ORAL DAILY
COMMUNITY
End: 2024-11-08 | Stop reason: HOSPADM

## 2024-10-29 RX ORDER — ASCORBIC ACID 1000 MG
30 TABLET ORAL DAILY PRN
COMMUNITY
Start: 2024-07-16

## 2024-10-29 RX ORDER — CEFTRIAXONE 1 G/50ML
1 INJECTION, SOLUTION INTRAVENOUS EVERY 24 HOURS
Status: DISCONTINUED | OUTPATIENT
Start: 2024-10-29 | End: 2024-10-30

## 2024-10-29 RX ORDER — LORAZEPAM 0.5 MG/1
0.5 TABLET ORAL 2 TIMES DAILY
COMMUNITY
Start: 2024-09-03

## 2024-10-29 RX ORDER — TAMSULOSIN HYDROCHLORIDE 0.4 MG/1
0.4 CAPSULE ORAL DAILY
Status: DISCONTINUED | OUTPATIENT
Start: 2024-10-29 | End: 2024-11-08 | Stop reason: HOSPADM

## 2024-10-29 RX ORDER — HALOPERIDOL 5 MG/ML
5 INJECTION INTRAMUSCULAR ONCE
Status: COMPLETED | OUTPATIENT
Start: 2024-10-29 | End: 2024-10-29

## 2024-10-29 RX ORDER — CALCIUM CARBONATE 500(1250)
2 TABLET ORAL 3 TIMES DAILY PRN
COMMUNITY
Start: 2024-07-16

## 2024-10-29 RX ORDER — ACETAMINOPHEN 325 MG/1
650 TABLET ORAL EVERY 4 HOURS PRN
COMMUNITY
Start: 2024-07-16

## 2024-10-29 RX ORDER — DEXTROSE, SODIUM CHLORIDE, SODIUM LACTATE, POTASSIUM CHLORIDE, AND CALCIUM CHLORIDE 5; .6; .31; .03; .02 G/100ML; G/100ML; G/100ML; G/100ML; G/100ML
75 INJECTION, SOLUTION INTRAVENOUS CONTINUOUS
Status: ACTIVE | OUTPATIENT
Start: 2024-10-29 | End: 2024-10-29

## 2024-10-29 RX ORDER — QUETIAPINE FUMARATE 25 MG/1
25 TABLET, FILM COATED ORAL ONCE AS NEEDED
Status: DISCONTINUED | OUTPATIENT
Start: 2024-10-29 | End: 2024-10-30

## 2024-10-29 RX ORDER — VENLAFAXINE 50 MG/1
50 TABLET ORAL DAILY
COMMUNITY
Start: 2024-10-15

## 2024-10-29 RX ORDER — MELATONIN 10 MG/ML
2 DROPS ORAL DAILY
COMMUNITY
Start: 2024-08-27

## 2024-10-29 SDOH — ECONOMIC STABILITY: TRANSPORTATION INSECURITY
IN THE PAST 12 MONTHS, HAS LACK OF TRANSPORTATION KEPT YOU FROM MEDICAL APPOINTMENTS OR FROM GETTING MEDICATIONS?: PATIENT UNABLE TO ANSWER

## 2024-10-29 SDOH — SOCIAL STABILITY: SOCIAL INSECURITY: DO YOU FEEL UNSAFE GOING BACK TO THE PLACE WHERE YOU ARE LIVING?: UNABLE TO ASSESS

## 2024-10-29 SDOH — ECONOMIC STABILITY: HOUSING INSECURITY: AT ANY TIME IN THE PAST 12 MONTHS, WERE YOU HOMELESS OR LIVING IN A SHELTER (INCLUDING NOW)?: PATIENT UNABLE TO ANSWER

## 2024-10-29 SDOH — SOCIAL STABILITY: SOCIAL NETWORK
DO YOU BELONG TO ANY CLUBS OR ORGANIZATIONS SUCH AS CHURCH GROUPS, UNIONS, FRATERNAL OR ATHLETIC GROUPS, OR SCHOOL GROUPS?: PATIENT UNABLE TO ANSWER

## 2024-10-29 SDOH — ECONOMIC STABILITY: FOOD INSECURITY
WITHIN THE PAST 12 MONTHS, YOU WORRIED THAT YOUR FOOD WOULD RUN OUT BEFORE YOU GOT THE MONEY TO BUY MORE.: PATIENT UNABLE TO ANSWER

## 2024-10-29 SDOH — SOCIAL STABILITY: SOCIAL INSECURITY: WERE YOU ABLE TO COMPLETE ALL THE BEHAVIORAL HEALTH SCREENINGS?: NO

## 2024-10-29 SDOH — HEALTH STABILITY: PHYSICAL HEALTH: ON AVERAGE, HOW MANY DAYS PER WEEK DO YOU ENGAGE IN MODERATE TO STRENUOUS EXERCISE (LIKE A BRISK WALK)?: 0 DAYS

## 2024-10-29 SDOH — SOCIAL STABILITY: SOCIAL INSECURITY: ABUSE: ADULT

## 2024-10-29 SDOH — SOCIAL STABILITY: SOCIAL INSECURITY: DOES ANYONE TRY TO KEEP YOU FROM HAVING/CONTACTING OTHER FRIENDS OR DOING THINGS OUTSIDE YOUR HOME?: UNABLE TO ASSESS

## 2024-10-29 SDOH — ECONOMIC STABILITY: FOOD INSECURITY
HOW HARD IS IT FOR YOU TO PAY FOR THE VERY BASICS LIKE FOOD, HOUSING, MEDICAL CARE, AND HEATING?: PATIENT UNABLE TO ANSWER

## 2024-10-29 SDOH — SOCIAL STABILITY: SOCIAL INSECURITY
WITHIN THE LAST YEAR, HAVE YOU BEEN KICKED, HIT, SLAPPED, OR OTHERWISE PHYSICALLY HURT BY YOUR PARTNER OR EX-PARTNER?: PATIENT UNABLE TO ANSWER

## 2024-10-29 SDOH — ECONOMIC STABILITY: FOOD INSECURITY
WITHIN THE PAST 12 MONTHS, THE FOOD YOU BOUGHT JUST DIDN'T LAST AND YOU DIDN'T HAVE MONEY TO GET MORE.: PATIENT UNABLE TO ANSWER

## 2024-10-29 SDOH — SOCIAL STABILITY: SOCIAL INSECURITY: HAS ANYONE EVER THREATENED TO HURT YOUR FAMILY OR YOUR PETS?: UNABLE TO ASSESS

## 2024-10-29 SDOH — SOCIAL STABILITY: SOCIAL INSECURITY: ARE THERE ANY APPARENT SIGNS OF INJURIES/BEHAVIORS THAT COULD BE RELATED TO ABUSE/NEGLECT?: UNABLE TO ASSESS

## 2024-10-29 SDOH — SOCIAL STABILITY: SOCIAL INSECURITY
WITHIN THE LAST YEAR, HAVE YOU BEEN RAPED OR FORCED TO HAVE ANY KIND OF SEXUAL ACTIVITY BY YOUR PARTNER OR EX-PARTNER?: PATIENT UNABLE TO ANSWER

## 2024-10-29 SDOH — SOCIAL STABILITY: SOCIAL INSECURITY: HAVE YOU HAD THOUGHTS OF HARMING ANYONE ELSE?: UNABLE TO ASSESS

## 2024-10-29 SDOH — HEALTH STABILITY: MENTAL HEALTH
DO YOU FEEL STRESS - TENSE, RESTLESS, NERVOUS, OR ANXIOUS, OR UNABLE TO SLEEP AT NIGHT BECAUSE YOUR MIND IS TROUBLED ALL THE TIME - THESE DAYS?: PATIENT UNABLE TO ANSWER

## 2024-10-29 SDOH — HEALTH STABILITY: MENTAL HEALTH: HOW OFTEN DO YOU HAVE SIX OR MORE DRINKS ON ONE OCCASION?: PATIENT UNABLE TO ANSWER

## 2024-10-29 SDOH — HEALTH STABILITY: PHYSICAL HEALTH
HOW OFTEN DO YOU NEED TO HAVE SOMEONE HELP YOU WHEN YOU READ INSTRUCTIONS, PAMPHLETS, OR OTHER WRITTEN MATERIAL FROM YOUR DOCTOR OR PHARMACY?: ALWAYS

## 2024-10-29 SDOH — SOCIAL STABILITY: SOCIAL INSECURITY: ARE YOU OR HAVE YOU BEEN THREATENED OR ABUSED PHYSICALLY, EMOTIONALLY, OR SEXUALLY BY ANYONE?: UNABLE TO ASSESS

## 2024-10-29 SDOH — SOCIAL STABILITY: SOCIAL NETWORK: HOW OFTEN DO YOU GET TOGETHER WITH FRIENDS OR RELATIVES?: PATIENT UNABLE TO ANSWER

## 2024-10-29 SDOH — SOCIAL STABILITY: SOCIAL NETWORK: HOW OFTEN DO YOU ATTEND CHURCH OR RELIGIOUS SERVICES?: PATIENT UNABLE TO ANSWER

## 2024-10-29 SDOH — ECONOMIC STABILITY: HOUSING INSECURITY
IN THE LAST 12 MONTHS, WAS THERE A TIME WHEN YOU WERE NOT ABLE TO PAY THE MORTGAGE OR RENT ON TIME?: PATIENT UNABLE TO ANSWER

## 2024-10-29 SDOH — ECONOMIC STABILITY: INCOME INSECURITY
IN THE PAST 12 MONTHS HAS THE ELECTRIC, GAS, OIL, OR WATER COMPANY THREATENED TO SHUT OFF SERVICES IN YOUR HOME?: PATIENT UNABLE TO ANSWER

## 2024-10-29 SDOH — SOCIAL STABILITY: SOCIAL INSECURITY: HAVE YOU HAD ANY THOUGHTS OF HARMING ANYONE ELSE?: UNABLE TO ASSESS

## 2024-10-29 SDOH — HEALTH STABILITY: MENTAL HEALTH: HOW MANY DRINKS CONTAINING ALCOHOL DO YOU HAVE ON A TYPICAL DAY WHEN YOU ARE DRINKING?: PATIENT UNABLE TO ANSWER

## 2024-10-29 SDOH — SOCIAL STABILITY: SOCIAL NETWORK: IN A TYPICAL WEEK, HOW MANY TIMES DO YOU TALK ON THE PHONE WITH FAMILY, FRIENDS, OR NEIGHBORS?: PATIENT UNABLE TO ANSWER

## 2024-10-29 SDOH — SOCIAL STABILITY: SOCIAL INSECURITY: WITHIN THE LAST YEAR, HAVE YOU BEEN AFRAID OF YOUR PARTNER OR EX-PARTNER?: PATIENT UNABLE TO ANSWER

## 2024-10-29 SDOH — HEALTH STABILITY: PHYSICAL HEALTH: ON AVERAGE, HOW MANY MINUTES DO YOU ENGAGE IN EXERCISE AT THIS LEVEL?: 0 MIN

## 2024-10-29 SDOH — SOCIAL STABILITY: SOCIAL INSECURITY: DO YOU FEEL ANYONE HAS EXPLOITED OR TAKEN ADVANTAGE OF YOU FINANCIALLY OR OF YOUR PERSONAL PROPERTY?: UNABLE TO ASSESS

## 2024-10-29 SDOH — ECONOMIC STABILITY: HOUSING INSECURITY: IN THE PAST 12 MONTHS, HOW MANY TIMES HAVE YOU MOVED WHERE YOU WERE LIVING?: 0

## 2024-10-29 SDOH — SOCIAL STABILITY: SOCIAL INSECURITY
WITHIN THE LAST YEAR, HAVE YOU BEEN HUMILIATED OR EMOTIONALLY ABUSED IN OTHER WAYS BY YOUR PARTNER OR EX-PARTNER?: PATIENT UNABLE TO ANSWER

## 2024-10-29 SDOH — SOCIAL STABILITY: SOCIAL NETWORK: HOW OFTEN DO YOU ATTEND MEETINGS OF THE CLUBS OR ORGANIZATIONS YOU BELONG TO?: PATIENT UNABLE TO ANSWER

## 2024-10-29 SDOH — SOCIAL STABILITY: SOCIAL INSECURITY: ARE YOU MARRIED, WIDOWED, DIVORCED, SEPARATED, NEVER MARRIED, OR LIVING WITH A PARTNER?: PATIENT UNABLE TO ANSWER

## 2024-10-29 SDOH — HEALTH STABILITY: MENTAL HEALTH: HOW OFTEN DO YOU HAVE A DRINK CONTAINING ALCOHOL?: PATIENT UNABLE TO ANSWER

## 2024-10-29 ASSESSMENT — COGNITIVE AND FUNCTIONAL STATUS - GENERAL
DRESSING REGULAR LOWER BODY CLOTHING: A LITTLE
WALKING IN HOSPITAL ROOM: A LITTLE
HELP NEEDED FOR BATHING: A LOT
MOBILITY SCORE: 18
MOVING FROM LYING ON BACK TO SITTING ON SIDE OF FLAT BED WITH BEDRAILS: A LITTLE
WALKING IN HOSPITAL ROOM: A LITTLE
PERSONAL GROOMING: A LOT
DAILY ACTIVITIY SCORE: 18
CLIMB 3 TO 5 STEPS WITH RAILING: A LITTLE
TOILETING: A LITTLE
STANDING UP FROM CHAIR USING ARMS: A LITTLE
HELP NEEDED FOR BATHING: A LOT
DRESSING REGULAR LOWER BODY CLOTHING: A LOT
EATING MEALS: A LITTLE
DAILY ACTIVITIY SCORE: 13
CLIMB 3 TO 5 STEPS WITH RAILING: A LITTLE
DRESSING REGULAR UPPER BODY CLOTHING: A LOT
DAILY ACTIVITIY SCORE: 13
PERSONAL GROOMING: A LOT
MOVING TO AND FROM BED TO CHAIR: A LITTLE
TOILETING: A LOT
TOILETING: A LOT
HELP NEEDED FOR BATHING: A LITTLE
TURNING FROM BACK TO SIDE WHILE IN FLAT BAD: A LITTLE
DRESSING REGULAR LOWER BODY CLOTHING: A LOT
PERSONAL GROOMING: A LITTLE
EATING MEALS: A LITTLE
WALKING IN HOSPITAL ROOM: A LITTLE
DRESSING REGULAR UPPER BODY CLOTHING: A LITTLE
MOBILITY SCORE: 18
MOVING TO AND FROM BED TO CHAIR: A LITTLE
STANDING UP FROM CHAIR USING ARMS: A LITTLE
STANDING UP FROM CHAIR USING ARMS: A LITTLE
TURNING FROM BACK TO SIDE WHILE IN FLAT BAD: A LITTLE
EATING MEALS: A LITTLE
MOVING TO AND FROM BED TO CHAIR: A LITTLE
PATIENT BASELINE BEDBOUND: NO
DRESSING REGULAR UPPER BODY CLOTHING: A LOT
MOVING FROM LYING ON BACK TO SITTING ON SIDE OF FLAT BED WITH BEDRAILS: A LITTLE
MOBILITY SCORE: 20
CLIMB 3 TO 5 STEPS WITH RAILING: A LITTLE

## 2024-10-29 ASSESSMENT — ACTIVITIES OF DAILY LIVING (ADL)
WALKS IN HOME: NEEDS ASSISTANCE
HEARING - RIGHT EAR: FUNCTIONAL
HEARING - LEFT EAR: FUNCTIONAL
LACK_OF_TRANSPORTATION: NO
ADEQUATE_TO_COMPLETE_ADL: NO
DRESSING YOURSELF: NEEDS ASSISTANCE
LACK_OF_TRANSPORTATION: PATIENT UNABLE TO ANSWER
GROOMING: NEEDS ASSISTANCE
FEEDING YOURSELF: NEEDS ASSISTANCE
BATHING: NEEDS ASSISTANCE
PATIENT'S MEMORY ADEQUATE TO SAFELY COMPLETE DAILY ACTIVITIES?: NO
JUDGMENT_ADEQUATE_SAFELY_COMPLETE_DAILY_ACTIVITIES: NO
TOILETING: NEEDS ASSISTANCE
LACK_OF_TRANSPORTATION: PATIENT UNABLE TO ANSWER

## 2024-10-29 ASSESSMENT — LIFESTYLE VARIABLES
SKIP TO QUESTIONS 9-10: 0
HOW OFTEN DO YOU HAVE 6 OR MORE DRINKS ON ONE OCCASION: NEVER
PRESCIPTION_ABUSE_PAST_12_MONTHS: NO
AUDIT-C TOTAL SCORE: 0
HOW OFTEN DO YOU HAVE A DRINK CONTAINING ALCOHOL: NEVER
HOW MANY STANDARD DRINKS CONTAINING ALCOHOL DO YOU HAVE ON A TYPICAL DAY: PATIENT DOES NOT DRINK
SKIP TO QUESTIONS 9-10: 1
AUDIT-C TOTAL SCORE: -1
AUDIT-C TOTAL SCORE: 0
SUBSTANCE_ABUSE_PAST_12_MONTHS: NO

## 2024-10-29 ASSESSMENT — PAIN SCALES - GENERAL
PAINLEVEL_OUTOF10: 0 - NO PAIN
PAINLEVEL_OUTOF10: 0 - NO PAIN

## 2024-10-29 ASSESSMENT — PAIN - FUNCTIONAL ASSESSMENT
PAIN_FUNCTIONAL_ASSESSMENT: 0-10
PAIN_FUNCTIONAL_ASSESSMENT: UNABLE TO SELF-REPORT

## 2024-10-29 ASSESSMENT — PATIENT HEALTH QUESTIONNAIRE - PHQ9
2. FEELING DOWN, DEPRESSED OR HOPELESS: NOT AT ALL
1. LITTLE INTEREST OR PLEASURE IN DOING THINGS: NOT AT ALL
SUM OF ALL RESPONSES TO PHQ9 QUESTIONS 1 & 2: 0

## 2024-10-29 ASSESSMENT — PAIN SCALES - WONG BAKER: WONGBAKER_NUMERICALRESPONSE: NO HURT

## 2024-10-29 NOTE — PROGRESS NOTES
10/29/24 1143   Discharge Planning   Living Arrangements Alone   Support Systems Children   Assistance Needed yes   Type of Residence Assisted living   Care Facility Name [facility] in memory care unit   Home or Post Acute Services Post acute facilities (Rehab/SNF/etc)   Type of Post Acute Facility Services Assisted living   Expected Discharge Disposition Home   Does the patient need discharge transport arranged? Yes   RoundTrip coordination needed? Yes   Has discharge transport been arranged? No   Financial Resource Strain   How hard is it for you to pay for the very basics like food, housing, medical care, and heating? Not hard   Housing Stability   In the last 12 months, was there a time when you were not able to pay the mortgage or rent on time? N   At any time in the past 12 months, were you homeless or living in a shelter (including now)? N   Transportation Needs   In the past 12 months, has lack of transportation kept you from medical appointments or from getting medications? no   In the past 12 months, has lack of transportation kept you from meetings, work, or from getting things needed for daily living? No     Spoke to patient's son, Herbert, on the phone to explain my role in discharge planning. He states patient lives at [facility] in the memory care unit. Plan is for patient to return to facility when medically ready.

## 2024-10-29 NOTE — ASSESSMENT & PLAN NOTE
FLORIN with creatinine of 2.99.  Baseline creatinine noted to be approximately 1.50.  Suspected mechanism is postrenal obstruction with patient having greater than 1200 cc of urine on bladder scan and ultimately in excess of 1500 cc of urine once straight cath was performed.    Will start Flomax at this time.  Does have noted BPH on imaging.  If patient continues to demonstrate difficulty with voiding despite initiation of Flomax, would consider urology consultation at that time.  Now that his obstruction has been resolved, monitor renal function with morning labs.  Anticipate interval improvement in creatinine

## 2024-10-29 NOTE — ASSESSMENT & PLAN NOTE
History of dementia with baseline described as A&O x 1.  His mentation does wax and wane.  One-time dose of as needed Haldol was administered for acute agitation and aggression.  Home medications to be reviewed and reconciled pending verification.  Additional as needed Haldol to be made available for agitation/aggressive features as it relates to his known dementia

## 2024-10-29 NOTE — ASSESSMENT & PLAN NOTE
Fall without any immediate traumatic sequelae.  Concern with patient on Coumadin however no evidence of intracranial bleed or other significant injury.  Holding Coumadin at this time due to supratherapeutic INR.  Maintain fall precautions.  PT/OT assessment

## 2024-10-29 NOTE — PROGRESS NOTES
Pharmacy Medication History Review    Slim Saldivar is a 89 y.o. male admitted for FLORIN (acute kidney injury) (CMS-[facility]). Pharmacy reviewed the patient's [id number] medications and allergies for accuracy.    The list below reflects the updated PTA list.   Prior to Admission Medications   Prescriptions Last Dose Informant   Anti-DiarrheaL, loperamide, 2 mg tablet Unknown Other   Sig: Take 1 tablet (2 mg) by mouth 3 times a day as needed for diarrhea.   LORazepam (Ativan) 0.5 mg tablet Unknown Other   Sig: Take 0.5 tablets (0.25 mg) by mouth 2 times a day.   Milk of Magnesia 400 mg/5 mL suspension Unknown Other   Sig: Take 30 mL by mouth once daily as needed for constipation.   QUEtiapine (SEROquel) 25 mg tablet Unknown Other   Sig: Take 1 tablet (25 mg) by mouth once daily at bedtime.   acetaminophen (Tylenol) 325 mg tablet Unknown Other   Sig: Take 2 tablets (650 mg) by mouth every 4 hours if needed for mild pain (1 - 3).   allopurinol (Zyloprim) 100 mg tablet Unknown Other   Sig: TAKE 1 TABLET DAILY   cholecalciferol, vitamin D3, 50 mcg (2,000 unit) tablet,chewable Unknown Other   Sig: Chew 2 tablets once daily.   divalproex sprinkle (Depakote Sprinkle) 125 mg DR capsule Unknown Other   Sig: Take 2 capsules (250 mg) by mouth 2 times a day.   metoprolol succinate XL (Toprol-XL) 50 mg 24 hr tablet Unknown Other   Sig: Take 1 tablet (50 mg) by mouth once daily.   venlafaxine (Effexor) 50 mg tablet Unknown Other   Sig: Take 1 tablet (50 mg) by mouth once daily.   warfarin (Coumadin) 3 mg tablet Not Taking Other   Sig: Take 1 tablet (3 mg) by mouth once daily at bedtime. Do not fill before July 10, 2024.   Patient not taking: Reported on 10/29/2024   warfarin (Coumadin) 4 mg tablet Unknown Other   Sig: Take 1 tablet (4 mg) by mouth once daily. Take as directed per After Visit Summary.      Facility-Administered Medications: None        The list below reflects the updated allergy list. Please review each  "documented allergy for additional clarification and justification.  Allergies  Reviewed by Patria Mena RN on 10/28/2024   No Known Allergies         Patient was unable to be assessed for M2B at discharge.     Sources:   [facility] Medication Review Report printed at facility 10/2/28, faxed from [facility] ED  EPIC dispense report run 10/29/AM    Medications ADDED:  Acetaminophen 325mg   Loperamide 2mg  Divalproex 125mg DR cap  Lorazepam 0.5mg  Milk of Magnesia  Venlafaxine 50mg  Vitamin D 2000IU  Warfarin 4mg   Medications CHANGED:  Warfarin strength changed from 3mg --> 4mg  Medications REMOVED/MARKED NOT TAKING:   Calcitriol 0.25mcg  Donepezil 10mg     Additional Comments:  None    Sushila Aquino, PharmD  Transitions of Care Pharmacist  10/29/24     Secure Chat preferred   If no response call [phone] or Vocera \"Med Rec\"    "

## 2024-10-29 NOTE — ED PROVIDER NOTES
EMERGENCY DEPARTMENT ENCOUNTER      Pt Name: Slim Saldivar  MRN: [id number]  Birthdate 1935  Date of evaluation: 10/28/2024  Provider: Mark Blunt DO    CHIEF COMPLAINT       Chief Complaint   Patient presents with    Fall     HISTORY OF PRESENT ILLNESS    Slim Saldivar is a 89 y.o. year old male who presents to the ER for fall on Coumadin.  Per EMS the patient fell, landed on his butt, did not hit his head.  Initially the facility-concerned that he may have hit his head, refer on for interview revealed that he was leaning over his bed when he slumped backwards and landed without head injury.  He is oriented x 1 at baseline.  Patient denies chest pain shortness of breath nausea vomiting changes to urine or stool or acute pain in his head back or extremities.  EMS reports that he did get himself back up after falling.  PMH dementia, hypertension, A-fib on Coumadin, CKD 3     PAST MEDICAL HISTORY     Past Medical History:   Diagnosis Date    Anemia     Chronic kidney disease     Varicella      CURRENT MEDICATIONS       Previous Medications    ALLOPURINOL (ZYLOPRIM) 100 MG TABLET    TAKE 1 TABLET DAILY    CALCITRIOL (ROCALTROL) 0.25 MCG CAPSULE    Take 1 capsule (0.25 mcg) by mouth 5 times a week Mondays through Fridays. Take 2 capsules (0.5 mcg) by mouth 2 times a week on Sundays and Saturdays.    DONEPEZIL (ARICEPT) 10 MG TABLET    Take 1 tablet (10 mg) by mouth once daily at bedtime.    METOPROLOL SUCCINATE XL (TOPROL-XL) 25 MG 24 HR TABLET    Take 2 tablets (50 mg) by mouth once daily.    QUETIAPINE (SEROQUEL) 25 MG TABLET    Take 1 tablet (25 mg) by mouth once daily at bedtime.    WARFARIN (COUMADIN) 3 MG TABLET    Take 1 tablet (3 mg) by mouth once daily at bedtime. Do not fill before July 10, 2024.     SURGICAL HISTORY       Past Surgical History:   Procedure Laterality Date    CIRCUMCISION, PRIMARY      OTHER SURGICAL HISTORY  01/20/2022    Tonsillectomy    TONSILLECTOMY      VASECTOMY      NITA  TOOTH EXTRACTION       ALLERGIES     Patient has no known allergies.  FAMILY HISTORY       Family History   Problem Relation Name Age of Onset    Hypertension Mother Vida Saldivar     Stroke Mother Vida Saldivar     Heart disease Father Bird Saldivar     Cancer Sister Ashley Brownlee      SOCIAL HISTORY       Social History     Tobacco Use    Smoking status: Former     Current packs/day: 1.00     Types: Cigarettes    Smokeless tobacco: Never   Vaping Use    Vaping status: Never Used   Substance Use Topics    Alcohol use: Not Currently    Drug use: Never     PHYSICAL EXAM  (up to 7 for level 4, 8 or more for level 5)     ED Triage Vitals   Temperature Heart Rate Respirations BP   10/28/24 2144 10/28/24 2142 10/28/24 2142 10/28/24 2142   36.5 °C (97.7 °F) 93 18 147/87      Pulse Ox Temp Source Heart Rate Source Patient Position   10/28/24 2142 10/28/24 2144 10/28/24 2142 10/28/24 2142   95 % Temporal Monitor Sitting      BP Location FiO2 (%)     10/28/24 2142 --     Right arm        Physical Exam  Constitutional:       General: He is not in acute distress.     Appearance: He is not ill-appearing, toxic-appearing or diaphoretic.   HENT:      Head: Normocephalic and atraumatic.      Mouth/Throat:      Mouth: Mucous membranes are moist.      Pharynx: Oropharynx is clear.   Eyes:      Extraocular Movements: Extraocular movements intact.      Pupils: Pupils are equal, round, and reactive to light.   Cardiovascular:      Rate and Rhythm: Normal rate and regular rhythm.      Pulses:           Radial pulses are 2+ on the right side and 2+ on the left side.        Dorsalis pedis pulses are 2+ on the right side and 2+ on the left side.   Pulmonary:      Effort: Pulmonary effort is normal. No respiratory distress.      Breath sounds: No stridor. No wheezing, rhonchi or rales.   Chest:      Chest wall: No tenderness.   Abdominal:      General: Abdomen is flat.      Palpations: Abdomen is soft.      Tenderness: There is no abdominal  tenderness. There is no guarding or rebound.   Musculoskeletal:      Right shoulder: No deformity or tenderness. Normal range of motion.      Left shoulder: No deformity or tenderness. Normal range of motion.      Right upper arm: No deformity or tenderness.      Left upper arm: No deformity or tenderness.      Right elbow: No deformity. Normal range of motion. No tenderness.      Left elbow: No deformity. Normal range of motion. No tenderness.      Right forearm: No deformity or tenderness.      Left forearm: No deformity or tenderness.      Right wrist: No deformity, tenderness or snuff box tenderness. Normal range of motion.      Left wrist: No deformity, tenderness or snuff box tenderness. Normal range of motion.      Right hand: No tenderness. Normal range of motion.      Left hand: No tenderness. Normal range of motion.      Cervical back: Neck supple. No deformity or tenderness.      Thoracic back: No deformity or tenderness.      Lumbar back: No deformity or tenderness.      Right hip: No deformity or tenderness. Normal range of motion.      Left hip: No deformity or tenderness. Normal range of motion.      Right upper leg: No deformity or tenderness.      Left upper leg: No deformity or tenderness.      Right knee: No deformity. Normal range of motion. No tenderness.      Left knee: No deformity. Normal range of motion. No tenderness.      Right lower leg: No deformity or tenderness.      Left lower leg: No deformity or tenderness.      Right ankle: No deformity. No tenderness.      Left ankle: No deformity. No tenderness.      Right foot: Normal range of motion. No deformity or tenderness.      Left foot: Normal range of motion. No deformity or tenderness.   Skin:     General: Skin is warm and dry.      Capillary Refill: Capillary refill takes less than 2 seconds.      Findings: No bruising or lesion.   Neurological:      General: No focal deficit present.      Mental Status: Mental status is at baseline.       Cranial Nerves: No cranial nerve deficit.      Sensory: No sensory deficit.      Motor: No weakness.      Coordination: Coordination normal.      Comments: Oriented x self, birthday        DIAGNOSTIC RESULTS   LABS:  Labs Reviewed   CBC WITH AUTO DIFFERENTIAL - Abnormal       Result Value    WBC 6.6      nRBC 0.0      RBC 4.20 (*)     Hemoglobin 13.0 (*)     Hematocrit 39.3 (*)     MCV 94      MCH 31.0      MCHC 33.1      RDW 14.4      Platelets 201      Neutrophils % 58.7      Immature Granulocytes %, Automated 0.5      Lymphocytes % 25.2      Monocytes % 12.1      Eosinophils % 3.0      Basophils % 0.5      Neutrophils Absolute 3.87      Immature Granulocytes Absolute, Automated 0.03      Lymphocytes Absolute 1.66      Monocytes Absolute 0.80      Eosinophils Absolute 0.20      Basophils Absolute 0.03     COMPREHENSIVE METABOLIC PANEL - Abnormal    Glucose 86      Sodium 139      Potassium 4.0      Chloride 102      Bicarbonate 26      Anion Gap 15      Urea Nitrogen 33 (*)     Creatinine 2.99 (*)     eGFR 19 (*)     Calcium 8.8      Albumin 3.8      Alkaline Phosphatase 64      Total Protein 6.4      AST 20      Bilirubin, Total 0.7      ALT 12     PROTIME-INR - Abnormal    Protime 47.2 (*)     INR 4.1 (*)    URINALYSIS WITH REFLEX CULTURE AND MICROSCOPIC - Abnormal    Color, Urine Yellow      Appearance, Urine Cloudy (*)     Specific Gravity, Urine 1.025      pH, Urine 5.0      Protein, Urine NEGATIVE      Glucose, Urine NEGATIVE      Blood, Urine NEGATIVE      Ketones, Urine NEGATIVE      Bilirubin, Urine NEGATIVE      Urobilinogen, Urine NORM      Nitrite, Urine NEGATIVE      Leukocyte Esterase, Urine 250 Dain/µL (*)    MICROSCOPIC ONLY, URINE - Abnormal    WBC, Urine 11-20 (*)     RBC, Urine 3-5      Squamous Epithelial Cells, Urine 1-9 (SPARSE)     MAGNESIUM - Normal    Magnesium 1.75     TROPONIN I, HIGH SENSITIVITY - Normal    Troponin I, High Sensitivity 19      Narrative:     Less than 99th  percentile of normal range cutoff-  Female and children under 18 years old <14 ng/L; Male <21 ng/L: Negative  Repeat testing should be performed if clinically indicated.     Female and children under 18 years old 14-50 ng/L; Male 21-50 ng/L:  Consistent with possible cardiac damage and possible increased clinical   risk. Serial measurements may help to assess extent of myocardial damage.     >50 ng/L: Consistent with cardiac damage, increased clinical risk and  myocardial infarction. Serial measurements may help assess extent of   myocardial damage.      NOTE: Children less than 1 year old may have higher baseline troponin   levels and results should be interpreted in conjunction with the overall   clinical context.     NOTE: Troponin I testing is performed using a different   testing methodology at [facility] than at other   [facility]. Direct result comparisons should only   be made within the same method.   URINE CULTURE   URINALYSIS WITH REFLEX CULTURE AND MICROSCOPIC    Narrative:     The following orders were created for panel order Urinalysis with Reflex Culture and Microscopic.  Procedure                               Abnormality         Status                     ---------                               -----------         ------                     Urinalysis with Reflex C...[[id number]]  Abnormal            Final result               Extra Urine Gray Tube[[id number]]                            In process                   Please view results for these tests on the individual orders.   EXTRA URINE GRAY TUBE   CBC   COMPREHENSIVE METABOLIC PANEL     All other labs were within normal range or not returned as of this dictation.  Imaging  CT head wo IV contrast   Final Result   1.  No acute intracranial hemorrhage or depressed calvarial fracture.   2.  No acute fracture at the cervical spine. Degenerative changes.                  MACRO:   None.        Signed by: Saiar Bloom 10/28/2024  11:05 PM   Dictation workstation:   [id number]      CT cervical spine wo IV contrast   Final Result   1.  No acute intracranial hemorrhage or depressed calvarial fracture.   2.  No acute fracture at the cervical spine. Degenerative changes.                  MACRO:   None.        Signed by: Saira Bloom 10/28/2024 11:05 PM   Dictation workstation:   [id number]      XR chest 1 view   Final Result   Mild left basilar subsegmental atelectasis        MACRO:   None        Signed by: Sarmad Corbett 10/28/2024 10:31 PM   Dictation workstation:   [id number]         Procedure  Procedures  EMERGENCY DEPARTMENT COURSE/MDM:   Medical Decision Making  =================Attending note===============    The patient was seen by the resident/fellow.  I have personally performed a substantive portion of the encounter.  I have seen and examined the patient; agree with the workup, evaluation, MDM,   management and diagnosis.  The care plan has been discussed with the resident; I have reviewed the resident's note and agree with the documented findings.      This is a 89 y.o. male who presents to ER after a fall at his nursing facility.  He is on Coumadin.  Initially there is concern he may be a head injury on anticoagulation.  EMS reports they watched a security video and the patient did not violently fall to the floor.  They state that he was up by his bed any leaned onto the bed and then slid down to the floor.  Did not think there is any significant head injury.  There is no loss of consciousness or vomiting.  Patient has significant dementia and can give no further reliable history.  He states he did not fall.  He also has a history of chronic kidney disease.  He is not having any complaints of pain at this time.    Head atraumatic.  No cervical spine tenderness.  No pain with movement of the extremities.  No pain in the hip.  No pain in the back of the neck.  Heart regular.  Lungs clear.  Abdomen soft and nontender.    EKG:  "Atrial fibrillation with a rate of 93.  QTc 450.  No ST changes.            ==========================================        Vitals:    Vitals:    10/28/24 2142 10/28/24 2144 10/28/24 2300   BP: 147/87  (!) 146/101   BP Location: Right arm     Patient Position: Sitting     Pulse: 93  86   Resp: 18  11   Temp:  36.5 °C (97.7 °F)    TempSrc:  Temporal    SpO2: 95%  97%   Weight: 63.5 kg (140 lb)     Height: 1.702 m (5' 7\")       Slim Saldivar is a male 89 y.o. who presents to the ER for fall on coumadin. On arrival the patients vital signs were: Afebrile, Reguar HR, Normotensive, Tachypneic, and Normoxic on room air. History obtained from: patient and EMS .  Given fall on Coumadin plan for cardiac workup, UA, CT head and C-spine.  ED Course as of 10/29/24 0103   Mon Oct 28, 2024   2204 Protime-INR(!)  Supratherapeutic INR [CB]   2204 CBC and Auto Differential(!)  Normocytic anemia only slightly decreased from baseline, no acute leukocytosis leukopenia, thrombocytosis or thrombocytopenia [CB]   2322 Troponin I, High Sensitivity: 19  Not elevated doubt ACS [CB]   2322 Comprehensive Metabolic Panel(!)  FLORIN, otherwise no acute electrolyte or hepatic abnormality [CB]   2323 CT head wo IV contrast  1.  No acute intracranial hemorrhage or depressed calvarial fracture.  2.  No acute fracture at the cervical spine. Degenerative changes.   [CB]   2323 XR chest 1 view  Mild left basilar subsegmental atelectasis [CB]   Tue Oct 29, 2024   0103 Urinalysis with Reflex Culture and Microscopic(!)  No bacteria or nitrite, doubt UTI [CB]      ED Course User Index  [CB] Mark Blnut DO         Diagnoses as of 10/29/24 0103   Fall, initial encounter   Supratherapeutic INR   FLORIN (acute kidney injury) (CMS-[facility], located within [facility])       External Records Reviewed: I reviewed recent and relevant outside records including inpatient notes, outpatient records    Shared decision making for disposition  Patient and/or patient´s representative was counseled " regarding labs, imaging, likely diagnosis. All questions were answered. Recommendation was made   for Admission given the need for further escalation of care to inpatient management. Patient agreed and was admitted in stable condition. Admitting team was notified of any pending labs or imaging to ensure continuity of care.     ED Medications administered this visit:    Medications   lactated Ringer's bolus 1,000 mL (has no administration in time range)   pantoprazole (ProtoNix) EC tablet 40 mg (has no administration in time range)     Or   pantoprazole (ProtoNix) injection 40 mg (has no administration in time range)   acetaminophen (Tylenol) tablet 650 mg (has no administration in time range)     Or   acetaminophen (Tylenol) oral liquid 650 mg (has no administration in time range)     Or   acetaminophen (Tylenol) suppository 650 mg (has no administration in time range)   acetaminophen (Tylenol) tablet 650 mg (has no administration in time range)     Or   acetaminophen (Tylenol) oral liquid 650 mg (has no administration in time range)     Or   acetaminophen (Tylenol) suppository 650 mg (has no administration in time range)   ondansetron ODT (Zofran-ODT) disintegrating tablet 4 mg (has no administration in time range)     Or   ondansetron (Zofran) injection 4 mg (has no administration in time range)   metoclopramide (Reglan) tablet 5 mg (has no administration in time range)     Or   metoclopramide (Reglan) injection 5 mg (has no administration in time range)   melatonin tablet 3 mg (has no administration in time range)   LORazepam (Ativan) tablet 0.25 mg (0.25 mg oral Not Given 10/29/24 0002)   LORazepam (Ativan) injection 0.5 mg (0.5 mg intramuscular Given 10/29/24 0010)       New Prescriptions from this visit:    New Prescriptions    No medications on file       Follow-up:  Jessie Martinez MD  [id number] [facility] [id number] [phone]    In 1 day          Final Impression:   1. Fall, initial encounter     2. Supratherapeutic INR    3. FLORIN (acute kidney injury) (CMS-HCC)          Please excuse any misspellings or unintended errors related to the Dragon speech recognition software used to dictate this note.    I reviewed the case with the attending ED physician. The attending ED physician agrees with the plan.      Mark Blunt DO  Resident  10/29/24 0103

## 2024-10-29 NOTE — ASSESSMENT & PLAN NOTE
FLORIN with creatinine of 2.99.  Baseline creatinine noted to be approximately 1.50.  Suspected mechanism is postrenal obstruction with patient having greater than 1200 cc of urine on bladder scan and ultimately in excess of 1500 cc of urine once straight cath was performed.  Will continue Calhoun catheter drainage  Renal ultrasound  Urine creatinine urine urea and urine sodium  IV fluid gentle hydration   Will start Flomax at this time.  Does have noted BPH on imaging.    If patient continues to demonstrate difficulty with voiding despite initiation of Flomax, would consider urology consultation at that time.    Repeat labs in a.m.     Is This A New Presentation, Or A Follow-Up?: Skin Lesions How Severe Is Your Skin Lesion?: mild Have Your Skin Lesions Been Treated?: not been treated

## 2024-10-29 NOTE — CONSULTS
Inpatient consult to Urology  Consult performed by: JOVON Ramirez-CNP  Consult ordered by: Chivo Garsia MD  Reason for consult: Acute urinary reternsion, FLORIN and Hematuria  Assessment/Recommendations: Attending addendum: Plan developed in conjunction with Augusta Lorenzo CNP.  Imaging, workup independently reviewed.    In summary, 89-year-old male with dementia who was admitted following a fall, found to have severe urinary retention with 1500 cc of urine present.  He was straight catheterized and then subsequently was unable to void again and had a Watts catheter placed.  His INR is pathologically high at 4.1 and he has had associated gross hematuria since catheter placement.    Plan:  -Hematuria is very common following decompression of a severely distended bladder, particularly in the setting of anticoagulation  -Patient is being treated for his supratherapeutic INR, I suspect once his INR returns to normal hematuria should improve  -Manually irrigate as needed  -FLORIN likely of some degree of obstructive nature, trend and renally dose meds      History Of Present Illness  Slim Saldivar is a 89 y.o. male with PMHx of dementia, anemia, CKD, atrial fibrillation on coumadin, hyperlipidemia, mitral valve disorder that presented to the emergency department after a unwitnessed fall.  Patient unable to provide history with information obtained from EMR.  Patient is alert to self only and is noted to have sundowners.  Per EMR, patient was leaning over his bed when he became lightheaded after prolonged leaning which caused him to fall backwards and land on his backside.  It was noticed by nursing that patient had abdominal distention with patient being bladder scanned for 1063 and was straight cathed for 1500cc of urine.  Patient was again unable to urinate with watts catheter inserted and 1300cc of red tinged urine drained from bladder.      At time of consult, vital signs reported Tmax 36, HR 59, resp  15, /82, Sp02 96% on room air.  Labs reported no leukocytosis WBC 7.9, H&H 10.6/34.3, platelets 154, glucose 102, BUN/Scr 31/2.59, and INR 4.1.  Baseline creatinine around 1.5. A renal ultrasound reported suspected mild bilateral hydronephrosis of uncertain etiology.       Past Medical History  Past Medical History:   Diagnosis Date    Anemia     Chronic kidney disease     Varicella         Surgical History  Past Surgical History:   Procedure Laterality Date    CIRCUMCISION, PRIMARY      OTHER SURGICAL HISTORY  01/20/2022    Tonsillectomy    TONSILLECTOMY      VASECTOMY      WISDOM TOOTH EXTRACTION           Social History  Social Drivers of Health     Tobacco Use: Medium Risk (10/29/2024)    Patient History     Smoking Tobacco Use: Former     Smokeless Tobacco Use: Never     Passive Exposure: Past   Alcohol Use: Patient Unable To Answer (10/29/2024)    AUDIT-C     Frequency of Alcohol Consumption: Patient unable to answer     Average Number of Drinks: Patient unable to answer     Frequency of Binge Drinking: Patient unable to answer   Financial Resource Strain: Low Risk  (10/29/2024)    Overall Financial Resource Strain (CARDIA)     Difficulty of Paying Living Expenses: Not hard at all   Food Insecurity: Patient Unable To Answer (10/29/2024)    Hunger Vital Sign     Worried About Running Out of Food in the Last Year: Patient unable to answer     Ran Out of Food in the Last Year: Patient unable to answer   Transportation Needs: No Transportation Needs (10/29/2024)    PRAPARE - Transportation     Lack of Transportation (Medical): No     Lack of Transportation (Non-Medical): No   Physical Activity: Inactive (10/29/2024)    Exercise Vital Sign     Days of Exercise per Week: 0 days     Minutes of Exercise per Session: 0 min   Stress: Patient Unable To Answer (10/29/2024)    [employer] [place] [employer] - Occupational Stress Questionnaire     Feeling of Stress : Patient unable to answer   Social  Connections: Patient Unable To Answer (10/29/2024)    Social Connection and Isolation Panel [NHANES]     Frequency of Communication with Friends and Family: Patient unable to answer     Frequency of Social Gatherings with Friends and Family: Patient unable to answer     Attends Adventism Services: Patient unable to answer     Active Member of Clubs or Organizations: Patient unable to answer     Attends Club or Organization Meetings: Patient unable to answer     Marital Status: Patient unable to answer   Intimate Partner Violence: Patient Unable To Answer (10/29/2024)    Humiliation, Afraid, Rape, and Kick questionnaire     Fear of Current or Ex-Partner: Patient unable to answer     Emotionally Abused: Patient unable to answer     Physically Abused: Patient unable to answer     Sexually Abused: Patient unable to answer   Depression: Not at risk (10/29/2024)    PHQ-2     PHQ-2 Score: 0   Housing Stability: Low Risk  (10/29/2024)    Housing Stability Vital Sign     Unable to Pay for Housing in the Last Year: No     Number of Times Moved in the Last Year: 0     Homeless in the Last Year: No   Utilities: Patient Unable To Answer (10/29/2024)    [facility] Utilities     Threatened with loss of utilities: Patient unable to answer   Digital Equity: Not on file   Health Literacy: Inadequate Health Literacy (10/29/2024)     Health Literacy     Frequency of need for help with medical instructions: Always        Family History  Family History   Problem Relation Name Age of Onset    Hypertension Mother Vida Saldivar     Stroke Mother Vida Saldivar     Heart disease Father Bird Saldivar     Cancer Sister Ashley Brownlee         Home Medications  Prior to Admission medications    Medication Sig Start Date End Date Taking? Authorizing Provider   acetaminophen (Tylenol) 325 mg tablet Take 2 tablets (650 mg) by mouth every 4 hours if needed for mild pain (1 - 3). 7/16/24  Yes Historical Provider, MD   Anti-DiarrheaL, loperamide, 2 mg tablet Take  1 tablet (2 mg) by mouth 3 times a day as needed for diarrhea. 7/16/24  Yes Historical Provider, MD   cholecalciferol, vitamin D3, 50 mcg (2,000 unit) tablet,chewable Chew 2 tablets once daily. 8/27/24  Yes Historical Provider, MD   divalproex sprinkle (Depakote Sprinkle) 125 mg DR capsule Take 2 capsules (250 mg) by mouth 2 times a day. 10/10/24  Yes Historical Provider, MD   LORazepam (Ativan) 0.5 mg tablet Take 0.5 tablets (0.25 mg) by mouth 2 times a day. 9/3/24  Yes Historical Provider, MD   Milk of Magnesia 400 mg/5 mL suspension Take 30 mL by mouth once daily as needed for constipation. 7/16/24  Yes Historical Provider, MD   venlafaxine (Effexor) 50 mg tablet Take 1 tablet (50 mg) by mouth once daily. 10/15/24  Yes Historical Provider, MD   allopurinol (Zyloprim) 100 mg tablet TAKE 1 TABLET DAILY 11/28/23   Landen Capps MD   metoprolol succinate XL (Toprol-XL) 50 mg 24 hr tablet Take 1 tablet (50 mg) by mouth once daily.    Historical Provider, MD   QUEtiapine (SEROquel) 25 mg tablet Take 1 tablet (25 mg) by mouth once daily at bedtime. 7/8/24 8/7/24  Alex Jerry MD   warfarin (Coumadin) 3 mg tablet Take 1 tablet (3 mg) by mouth once daily at bedtime. Do not fill before July 10, 2024.  Patient not taking: Reported on 10/29/2024 7/10/24   Alex Jerry MD   warfarin (Coumadin) 4 mg tablet Take 1 tablet (4 mg) by mouth once daily. Take as directed per After Visit Summary.    Historical Provider, MD   calcitriol (Rocaltrol) 0.25 mcg capsule Take 1 capsule (0.25 mcg) by mouth 5 times a week Mondays through Fridays. Take 2 capsules (0.5 mcg) by mouth 2 times a week on Sundays and Saturdays. 11/4/17 10/29/24  Historical Provider, MD   donepezil (Aricept) 10 mg tablet Take 1 tablet (10 mg) by mouth once daily at bedtime. 9/18/20 10/29/24  Historical Provider, MD        Allergies  Patient has no known allergies.    Review of Systems  Review of Systems   Unable to perform ROS: Dementia        Physical Exam  Physical  "Exam  Vitals reviewed.   Constitutional:       General: He is awake.      Appearance: Normal appearance.   Cardiovascular:      Rate and Rhythm: Normal rate and regular rhythm.      Pulses: Normal pulses.      Heart sounds: Normal heart sounds.   Pulmonary:      Effort: Pulmonary effort is normal.      Breath sounds: Normal breath sounds and air entry.   Abdominal:      General: Abdomen is flat. There is no distension.      Palpations: Abdomen is soft.      Tenderness: There is no abdominal tenderness. There is no right CVA tenderness or left CVA tenderness.   Genitourinary:     Comments: Calhoun catheter with red tinged urine, some small clots seen in tubing.  Calhoun draining without issues.   Musculoskeletal:         General: Normal range of motion.      Cervical back: Normal range of motion.   Skin:     General: Skin is warm and dry.   Neurological:      General: No focal deficit present.      Mental Status: He is alert and oriented to person, place, and time.   Psychiatric:         Behavior: Behavior is cooperative.          Medications  Scheduled medications  cefTRIAXone, 1 g, intravenous, q24h  pantoprazole, 40 mg, oral, Daily before breakfast   Or  pantoprazole, 40 mg, intravenous, Daily before breakfast  tamsulosin, 0.4 mg, oral, Daily      Continuous medications  dextrose 5 % and lactated Ringer's, 75 mL/hr, Last Rate: 75 mL/hr (10/29/24 1405)      PRN medications  PRN medications: acetaminophen **OR** acetaminophen **OR** acetaminophen, acetaminophen **OR** acetaminophen **OR** acetaminophen, melatonin, ondansetron ODT **OR** ondansetron, QUEtiapine     Last Recorded Vitals  Heart Rate:  [59-93]   Temp:  [36 °C (96.8 °F)-36.5 °C (97.7 °F)]   Resp:  [11-20]   BP: (129-173)/()   Height:  [170.2 cm (5' 7\")]   Weight:  [63 kg (139 lb)-63.5 kg (140 lb)]   SpO2:  [95 %-97 %]      Input/Output    Intake/Output Summary (Last 24 hours) at 10/29/2024 1419  Last data filed at 10/29/2024 1406  Gross per 24 hour "   Intake 1563.75 ml   Output 2800 ml   Net -1236.25 ml        Labs  Results for orders placed or performed during the hospital encounter of 10/28/24 (from the past 24 hours)   CBC and Auto Differential   Result Value Ref Range    WBC 6.6 4.4 - 11.3 x10*3/uL    nRBC 0.0 0.0 - 0.0 /100 WBCs    RBC 4.20 (L) 4.50 - 5.90 x10*6/uL    Hemoglobin 13.0 (L) 13.5 - 17.5 g/dL    Hematocrit 39.3 (L) 41.0 - 52.0 %    MCV 94 80 - 100 fL    MCH 31.0 26.0 - 34.0 pg    MCHC 33.1 32.0 - 36.0 g/dL    RDW 14.4 11.5 - 14.5 %    Platelets 201 150 - 450 x10*3/uL    Neutrophils % 58.7 40.0 - 80.0 %    Immature Granulocytes %, Automated 0.5 0.0 - 0.9 %    Lymphocytes % 25.2 13.0 - 44.0 %    Monocytes % 12.1 2.0 - 10.0 %    Eosinophils % 3.0 0.0 - 6.0 %    Basophils % 0.5 0.0 - 2.0 %    Neutrophils Absolute 3.87 1.60 - 5.50 x10*3/uL    Immature Granulocytes Absolute, Automated 0.03 0.00 - 0.50 x10*3/uL    Lymphocytes Absolute 1.66 0.80 - 3.00 x10*3/uL    Monocytes Absolute 0.80 0.05 - 0.80 x10*3/uL    Eosinophils Absolute 0.20 0.00 - 0.40 x10*3/uL    Basophils Absolute 0.03 0.00 - 0.10 x10*3/uL   Comprehensive Metabolic Panel   Result Value Ref Range    Glucose 86 74 - 99 mg/dL    Sodium 139 136 - 145 mmol/L    Potassium 4.0 3.5 - 5.3 mmol/L    Chloride 102 98 - 107 mmol/L    Bicarbonate 26 21 - 32 mmol/L    Anion Gap 15 10 - 20 mmol/L    Urea Nitrogen 33 (H) 6 - 23 mg/dL    Creatinine 2.99 (H) 0.50 - 1.30 mg/dL    eGFR 19 (L) >60 mL/min/1.73m*2    Calcium 8.8 8.6 - 10.3 mg/dL    Albumin 3.8 3.4 - 5.0 g/dL    Alkaline Phosphatase 64 33 - 136 U/L    Total Protein 6.4 6.4 - 8.2 g/dL    AST 20 9 - 39 U/L    Bilirubin, Total 0.7 0.0 - 1.2 mg/dL    ALT 12 10 - 52 U/L   Magnesium   Result Value Ref Range    Magnesium 1.75 1.60 - 2.40 mg/dL   Protime-INR   Result Value Ref Range    Protime 47.2 (H) 9.8 - 12.8 seconds    INR 4.1 (H) 0.9 - 1.1   Troponin I, High Sensitivity   Result Value Ref Range    Troponin I, High Sensitivity 19 0 - 20 ng/L   ECG  12 lead   Result Value Ref Range    Ventricular Rate 93 BPM    Atrial Rate 416 BPM    QRS Duration 82 ms    QT Interval 362 ms    QTC Calculation(Bazett) 450 ms    R Axis 67 degrees    T Axis 8 degrees    QRS Count 16 beats    Q Onset 224 ms    T Offset 405 ms    QTC Fredericia 419 ms   Urinalysis with Reflex Culture and Microscopic   Result Value Ref Range    Color, Urine Yellow Straw, Yellow    Appearance, Urine Cloudy (N) Clear    Specific Gravity, Urine 1.025 1.005 - 1.035    pH, Urine 5.0 5.0, 5.5, 6.0, 6.5, 7.0, 7.5, 8.0    Protein, Urine NEGATIVE NEGATIVE, 10 (TRACE) mg/dL    Glucose, Urine NEGATIVE NEGATIVE mg/dL    Blood, Urine NEGATIVE NEGATIVE    Ketones, Urine NEGATIVE NEGATIVE mg/dL    Bilirubin, Urine NEGATIVE NEGATIVE    Urobilinogen, Urine NORM NORM mg/dL    Nitrite, Urine NEGATIVE NEGATIVE    Leukocyte Esterase, Urine 250 Dain/µL (A) NEGATIVE   Extra Urine Gray Tube   Result Value Ref Range    Extra Tube Hold for add-ons.    Microscopic Only, Urine   Result Value Ref Range    WBC, Urine 11-20 (A) 1-5, NONE /HPF    RBC, Urine 3-5 NONE, 1-2, 3-5 /HPF    Squamous Epithelial Cells, Urine 1-9 (SPARSE) Reference range not established. /HPF   CBC   Result Value Ref Range    WBC 7.9 4.4 - 11.3 x10*3/uL    nRBC 0.0 0.0 - 0.0 /100 WBCs    RBC 3.52 (L) 4.50 - 5.90 x10*6/uL    Hemoglobin 10.6 (L) 13.5 - 17.5 g/dL    Hematocrit 34.3 (L) 41.0 - 52.0 %    MCV 97 80 - 100 fL    MCH 30.1 26.0 - 34.0 pg    MCHC 30.9 (L) 32.0 - 36.0 g/dL    RDW 14.4 11.5 - 14.5 %    Platelets 154 150 - 450 x10*3/uL   Comprehensive metabolic panel   Result Value Ref Range    Glucose 102 (H) 74 - 99 mg/dL    Sodium 141 136 - 145 mmol/L    Potassium 3.8 3.5 - 5.3 mmol/L    Chloride 106 98 - 107 mmol/L    Bicarbonate 25 21 - 32 mmol/L    Anion Gap 14 10 - 20 mmol/L    Urea Nitrogen 31 (H) 6 - 23 mg/dL    Creatinine 2.59 (H) 0.50 - 1.30 mg/dL    eGFR 23 (L) >60 mL/min/1.73m*2    Calcium 8.3 (L) 8.6 - 10.3 mg/dL    Albumin 3.1 (L) 3.4 - 5.0  g/dL    Alkaline Phosphatase 53 33 - 136 U/L    Total Protein 5.2 (L) 6.4 - 8.2 g/dL    AST 16 9 - 39 U/L    Bilirubin, Total 0.5 0.0 - 1.2 mg/dL    ALT 9 (L) 10 - 52 U/L   Coagulation Screen   Result Value Ref Range    Protime 46.9 (H) 9.8 - 12.8 seconds    INR 4.1 (H) 0.9 - 1.1    aPTT 50 (H) 27 - 38 seconds   Urea Nitrogen, Urine Random   Result Value Ref Range    Urea Nitrogen, Urine Random 304 mg/dL    Creatinine, Urine Random 58.5 20.0 - 370.0 mg/dL    Urea Nitrogen/Creatinine Ratio 5.2 Not established. g/g creat   Sodium, Urine Random   Result Value Ref Range    Sodium, Urine Random 71 mmol/L    Creatinine, Urine Random 58.5 20.0 - 370.0 mg/dL    Sodium/Creatinine Ratio 121 Not established. mmol/g Creat       Imaging  ECG 12 lead    Result Date: 10/29/2024  Atrial fibrillation Abnormal ECG When compared with ECG of 06-JUL-2024 20:30, T wave inversion now evident in Inferior leads    CT head wo IV contrast    Result Date: 10/28/2024  Interpreted By:  Saira Bloom, STUDY: CT HEAD WO IV CONTRAST; CT CERVICAL SPINE WO IV CONTRAST;  10/28/2024 10:30 pm   INDICATION: Signs/Symptoms:fall on coumadin.   COMPARISON: CT head 07/06/2024   ACCESSION NUMBER(S): [id number]; [id number]   ORDERING CLINICIAN: MICHELLE CAMARENA   TECHNIQUE: Axial noncontrast images of the head  with coronal  and sagittal reconstructed images . Axial noncontrast images of the cervical spine with coronal and sagittal reconstructed images.   FINDINGS: BRAIN PARENCHYMA: There are periventricular white matter hypodensities, probably representing chronic microvascular ischemic changes. Otherwise, the gray-white matter interfaces are preserved. No acute territorial infarct, mass effect or midline shift. HEMORRHAGE: No acute intracranial hemorrhage. VENTRICLES and EXTRA-AXIAL SPACES: Prominent, consistent with diffuse parenchymal volume loss. No extra-axial fluid collection. EXTRACRANIAL SOFT TISSUES: Within normal limits. CALVARIUM: No depressed  calvarial fracture. PARANASAL SINUSES: No air-fluid levels. MASTOIDS: Within normal limits.   CERVICAL SPINE: ALIGNMENT: Within normal limits. VERTEBRAE: No acute fracture. DISCS: There is multilevel degenerative disc disease with osteophytosis. There is multilevel facet arthropathy. PREVERTEBRAL SOFT TISSUES: No prevertebral soft tissue swelling. LUNG APICES: No acute findings at the visualized lung apices.         1.  No acute intracranial hemorrhage or depressed calvarial fracture. 2.  No acute fracture at the cervical spine. Degenerative changes.       MACRO: None.   Signed by: Saira Bloom 10/28/2024 11:05 PM Dictation workstation:   [id number]    CT cervical spine wo IV contrast    Result Date: 10/28/2024  Interpreted By:  Saira Bloom, STUDY: CT HEAD WO IV CONTRAST; CT CERVICAL SPINE WO IV CONTRAST;  10/28/2024 10:30 pm   INDICATION: Signs/Symptoms:fall on coumadin.   COMPARISON: CT head 07/06/2024   ACCESSION NUMBER(S): [id number]; [id number]   ORDERING CLINICIAN: MICHELLE CAMARENA   TECHNIQUE: Axial noncontrast images of the head  with coronal  and sagittal reconstructed images . Axial noncontrast images of the cervical spine with coronal and sagittal reconstructed images.   FINDINGS: BRAIN PARENCHYMA: There are periventricular white matter hypodensities, probably representing chronic microvascular ischemic changes. Otherwise, the gray-white matter interfaces are preserved. No acute territorial infarct, mass effect or midline shift. HEMORRHAGE: No acute intracranial hemorrhage. VENTRICLES and EXTRA-AXIAL SPACES: Prominent, consistent with diffuse parenchymal volume loss. No extra-axial fluid collection. EXTRACRANIAL SOFT TISSUES: Within normal limits. CALVARIUM: No depressed calvarial fracture. PARANASAL SINUSES: No air-fluid levels. MASTOIDS: Within normal limits.   CERVICAL SPINE: ALIGNMENT: Within normal limits. VERTEBRAE: No acute fracture. DISCS: There is multilevel degenerative disc disease  with osteophytosis. There is multilevel facet arthropathy. PREVERTEBRAL SOFT TISSUES: No prevertebral soft tissue swelling. LUNG APICES: No acute findings at the visualized lung apices.         1.  No acute intracranial hemorrhage or depressed calvarial fracture. 2.  No acute fracture at the cervical spine. Degenerative changes.       MACRO: None.   Signed by: Saira Bloom 10/28/2024 11:05 PM Dictation workstation:   [id number]    XR chest 1 view    Result Date: 10/28/2024  Interpreted By:  Sarmad Corbett, STUDY: XR CHEST 1 VIEW;  10/28/2024 10:00 pm   INDICATION: Signs/Symptoms:Chest Pain.   COMPARISON: 7/6/2024   ACCESSION NUMBER(S): [id number]   ORDERING CLINICIAN: MICHELLE CAMARENA   FINDINGS: The cardiac silhouette is stable in size. Mild left basilar subsegmental atelectasis. No significant pleural effusion. No pneumothorax.       Mild left basilar subsegmental atelectasis   MACRO: None   Signed by: Sarmad Corbett 10/28/2024 10:31 PM Dictation workstation:   [id number]         Plan  #Hematuria - on coumadin   #Urinary Retention    - Continue to hold anticoagulation.  Most recent INR 4.1  - Maintain watts catheter.  Manually irrigate Q4 sterile water until urine clear to light pink and to maintain patency.  - Will plan for TOV prior to discharge and when patient more ambulatory.     #FLORIN on CKD  - BUN/Scr 31/2.59.  Baseline creatinine around 1.5.   - Should improve with watts placement.   - Renally dose medications  - Avoid nephrotoxins     Plan of care discussed.  Further recommendations as per Dr. Hyatt.     JOVON Ramirez-CNP    I spent 45 minutes in the professional and overall care of this patient.

## 2024-10-29 NOTE — DISCHARGE INSTRUCTIONS
-Please follow up with your PCP in 7-10 days, please call to schedule   --recommend discussing risk/benefits of continuing coumadin/warfarin with your PCP/Cardiology   -Please follow up with urology in 1 week, please call to schedule   -maintain watts catheter or straight cath q8hrs until follow up with urology    -Please check daily INR and adjust warfarin as needed until INR is at goal 2-3

## 2024-10-29 NOTE — ED TRIAGE NOTES
Pt here for fall, aox1, at baseline, pt on coumadin. Witnessed fall from snf on video. Pt did not hit head per video. DNR [facility]. From [facility]

## 2024-10-29 NOTE — PROGRESS NOTES
Slim Saldivar is a 89 y.o. male on day 0 of admission presenting with FLORIN (acute kidney injury) (CMS-[facility]).      Subjective   Slim Saldivar is a 89 y.o. male presenting with chief complaint of fall while on Coumadin.  Fall was unwitnessed but patient was able to recount the events leading up to his hospital admission.  Of note, he is best described as alert and oriented x 1 with noted sundowning.  He reports that he was leaning over his bed when he became lightheaded after prolonged leaning causing him to fall backwards and landing on his backside.  He denies any head injury or loss of consciousness.  Denies any pain related to the injury or any difficulties with movement/ambulation.  Initial evaluation at the time of admission revealed a significant FLORIN with a creatinine of 2.99.  Baseline creatinine is approximately 1.50.  Upon arrival to the medical floors, patient's bedside nurse noted abdominal distention and suprapubic distention with bladder scan revealing greater than 1200 cc of retained urine.  Straight cath x 1 produced over 1.5 L of urine and subsequent reduction in patient's abdominal and suprapubic symptoms.   10/29: Patient was seen and examined.  He was drowsy but arousable when I saw this morning.  Alert oriented x 1 to first name only.  Still confused but was able to deny chest pain or abdominal pain.  He still had suprapubic fullness this morning.  So I have ordered a Calhoun catheter placement for continuous drainage.  I will get a renal ultrasound.  Will consider urology and nephrology in the next 24 hours depending on the renal ultrasound findings.  Will also get start him on gentle hydration.  Urine creatinine urine urea and urine sodium to be accurate FeNa/Feurea.       Objective     Last Recorded Vitals  BP (!) 142/99 (BP Location: Right arm, Patient Position: Lying)   Pulse 86   Temp 36 °C (96.8 °F) (Temporal)   Resp 14   Wt 63 kg (139 lb)   SpO2 97%   Intake/Output last 3  Shifts:    Intake/Output Summary (Last 24 hours) at 10/29/2024 1016  Last data filed at 10/29/2024 0452  Gross per 24 hour   Intake 1060 ml   Output 1500 ml   Net -440 ml       Admission Weight  Weight: 63.5 kg (140 lb) (10/28/24 2142)    Daily Weight  10/29/24 : 63 kg (139 lb)    Image Results  ECG 12 lead  Atrial fibrillation  Abnormal ECG  When compared with ECG of 06-JUL-2024 20:30,  T wave inversion now evident in Inferior leads      Physical Exam  Constitutional:       Appearance: Confused elderly looking  male with dry mucous membrane afebrile anicteric .   HENT:      Head: Normocephalic and atraumatic.      Nose: Nose normal.      Mouth/Throat:      Mouth: Mucous membranes are moist.   Eyes:      Extraocular Movements: Extraocular movements intact.      Conjunctiva/sclera: Conjunctivae normal.      Pupils: Pupils are equal, round, and reactive to light.   Cardiovascular:      Rate and Rhythm: Normal rate and regular rhythm.      Pulses: Normal pulses.      Heart sounds: Normal heart sounds.   Pulmonary:      Effort: Pulmonary effort is normal.      Breath sounds: Normal breath sounds.   Abdominal:      General: Bowel sounds are normal.      Palpations: Abdomen is soft.   Musculoskeletal:         General: Normal range of motion.      Cervical back: Normal range of motion and neck supple.   Skin:     General: Skin is warm and dry.   Neurological:      General: No focal deficit present.      Mental Status: He is drowsy but arousable.  A and O x 1 mental status is at baseline.   Psychiatric:         Mood and Affect: Mood normal.         Behavior: Confused  Relevant Results               Assessment/Plan                  Assessment & Plan  FLORIN (acute kidney injury) (CMS-[facility])  FLORIN with creatinine of 2.99.  Baseline creatinine noted to be approximately 1.50.  Suspected mechanism is postrenal obstruction with patient having greater than 1200 cc of urine on bladder scan and ultimately in excess of 1500 cc of  urine once straight cath was performed.  Will continue Calhoun catheter drainage  Renal ultrasound  Urine creatinine urine urea and urine sodium  IV fluid gentle hydration   Will start Flomax at this time.  Does have noted BPH on imaging.    If patient continues to demonstrate difficulty with voiding despite initiation of Flomax, would consider urology consultation at that time.    Repeat labs in a.m.    Dementia with aggressive behavior (Multi)  History of dementia with baseline described as A&O x 1.  His mentation does wax and wane.  One-time dose of as needed Haldol was administered for acute agitation and aggression.  Home medications to be reviewed and reconciled pending verification.  Additional as needed Haldol to be made available for agitation/aggressive features as it relates to his known dementia  Fall  Fall without any immediate traumatic sequelae.  Concern with patient on Coumadin however no evidence of intracranial bleed or other significant injury.  Holding Coumadin at this time due to supratherapeutic INR.  Maintain fall precautions.  PT/OT assessment  Supratherapeutic INR  4.1 at the time of admission.  No evidence of active bleeding.    Continue to hold Coumadin at this time.  Check repeat coagulation studies with morning blood work    Acute cystitis  Started on IV antibiotics with ceftriaxone  Urine cultures are still pending  Trend CBC daily         Spent 35 minutes in the follow-up management of this patient today       Chivo Garsia MD

## 2024-10-29 NOTE — PROGRESS NOTES
"Nutrition Initial Assessment:   Nutrition Assessment    Reason for Assessment: Admission nursing screening (MST for weight loss and decreased appetite)    Patient is a 89 y.o. male presenting with chief complaint of fall while on Coumadin.     Past Medical History:   Diagnosis Date    Anemia     Chronic kidney disease     Varicella      Past Surgical History:   Procedure Laterality Date    CIRCUMCISION, PRIMARY      OTHER SURGICAL HISTORY  01/20/2022    Tonsillectomy    TONSILLECTOMY      VASECTOMY      WISDOM TOOTH EXTRACTION           Nutrition History:  Food and Nutrient History: Pt familiar to me from 3 months ago when he was hospitalized. He was eating poorly at that time. Today, he is not interested in eating either. Nursing have been working on him all morning to establish IV access. Pt is tired and barely audible when he speaks, but makes eye contact and accepts fluids. Tried to call his place of residence ([employer] [phone]) but no answer and tried to charli his spouse, but number is changed/disconnected. Pt was on a regular diet prior to admit. His weight shows he has maintained it over the past 3 months.  Vitamin/Herbal Supplement Use: Meds include Vitamin D3  Food Allergies/Intolerances:  None  GI Symptoms: None  Oral Problems: None  0-10 (Numeric) Pain Score: 0 - No pain  Jeff-Baker FACES Pain Rating: No hurt      Anthropometrics:  Height: 170.2 cm (5' 7\")   Weight: 63 kg (139 lb)   BMI (Calculated): 21.77  IBW/kg (Dietitian Calculated): 67.13 kg  Percent of IBW: 94.59 %       Weight History:   Wt Readings from Last 10 Encounters:   10/29/24 63 kg (139 lb)   07/06/24 60.8 kg (134 lb)   03/12/24 65.8 kg (145 lb)   02/26/24 67.6 kg (149 lb)   09/12/23 67.6 kg (149 lb)   08/21/23 65.3 kg (144 lb)   08/04/23 70.3 kg (155 lb)   03/14/23 70.5 kg (155 lb 8 oz)   09/20/22 70.3 kg (155 lb)   08/18/22 69.9 kg (154 lb)       Weight Change %:  Significant Weight Loss: No    Nutrition Focused Physical Exam " Findings:    Subcutaneous Fat Loss:   Orbital Fat Pads: Mild-Moderate (slight dark circles and slight hollowing)  Buccal Fat Pads: Mild-Moderate (flat cheeks, minimal bounce)  Triceps: Mild-Moderate (less than ample fat tissue)  Ribs: Defer  Muscle Wasting:  Temporalis: Mild-Moderate (slight depression)  Pectoralis (Clavicular Region): Mild-Moderate (some protrusion of clavicle)  Deltoid/Trapezius: Mild-Moderate (slight protrusion of acromion process)  Interosseous: Defer  Trapezius/Infraspinatus/Supraspinatus (Scapular Region): Defer  Quadriceps: Defer  Gastrocnemius: Defer  Edema:  Edema: none  Physical Findings:  Skin: Negative    Nutrition Significant Labs:  CBC Trend:   Results from last 7 days   Lab Units 10/29/24  0556 10/28/24  2150   WBC AUTO x10*3/uL 7.9 6.6   RBC AUTO x10*6/uL 3.52* 4.20*   HEMOGLOBIN g/dL 10.6* 13.0*   HEMATOCRIT % 34.3* 39.3*   MCV fL 97 94   PLATELETS AUTO x10*3/uL 154 201    , BMP Trend:   Results from last 7 days   Lab Units 10/29/24  0556 10/28/24  2150   GLUCOSE mg/dL 102* 86   CALCIUM mg/dL 8.3* 8.8   SODIUM mmol/L 141 139   POTASSIUM mmol/L 3.8 4.0   CO2 mmol/L 25 26   CHLORIDE mmol/L 106 102   BUN mg/dL 31* 33*   CREATININE mg/dL 2.59* 2.99*        Nutrition Specific Medications:  Scheduled medications  cefTRIAXone, 1 g, intravenous, q24h  pantoprazole, 40 mg, oral, Daily before breakfast   Or  pantoprazole, 40 mg, intravenous, Daily before breakfast  tamsulosin, 0.4 mg, oral, Daily      Continuous medications  dextrose 5 % and lactated Ringer's, 75 mL/hr, Last Rate: 75 mL/hr (10/29/24 1137)      PRN medications  PRN medications: acetaminophen **OR** acetaminophen **OR** acetaminophen, acetaminophen **OR** acetaminophen **OR** acetaminophen, melatonin, ondansetron ODT **OR** ondansetron, QUEtiapine      I/O:    ;      Dietary Orders (From admission, onward)       Start     Ordered    10/29/24 0854  Oral nutritional supplements  Until discontinued        Question Answer Comment    Deliver with All meals    Select supplement: Product from home - please specify    Additional Details ENSURE COMPACT - no chocolate        10/29/24 0854    10/29/24 0400  May Not Participate in Room Service  ( ROOM SERVICE MAY NOT PARTICIPATE)  Once        Question:  .  Answer:  Yes    10/29/24 0359    10/28/24 2357  Adult diet Regular  Diet effective now        Question:  Diet type  Answer:  Regular    10/28/24 2356                     Estimated Needs:   Total Energy Estimated Needs (kCal): 1630 kCal  Method for Estimating Needs: MSJ (1254) x 1.3 x 1.0  Total Protein Estimated Needs (g): 63 g  Method for Estimating Needs: 1.0g/kg  Total Fluid Estimated Needs (mL): 1600 mL  Method for Estimating Needs: 1mL/kcal        Nutrition Diagnosis   Malnutrition Diagnosis  Patient has Malnutrition Diagnosis: Yes  Diagnosis Status: New  Malnutrition Diagnosis: Mild malnutrition related to acute disease or injury  As Evidenced by: sudden poor oral intake just prior or during start of admission along with mild to moderate loss of subcutaneous fat and muscle wasting.            Nutrition Interventions/Recommendations         Nutrition Prescription:  Individualized Nutrition Prescription Provided for : Continue REGULAR diet as ordered.        Nutrition Interventions:   Interventions: Feeding assistance  Meals and Snacks: General healthful diet  Medical Food Supplement: Commercial beverage  Goal: ENSURE COMPACT with every meal (no екатерина) 220 kcal/9g protein each  Feeding Assistance: Menu selection assistance, Meal set-up         Nutrition Education:   Pt not appropriate.       Nutrition Monitoring and Evaluation   Food/Nutrient Related History Monitoring  Monitoring and Evaluation Plan: Amount of food  Amount of Food: Estimated amout of food  Criteria: Pt to consume >50% of meals and ONS provided    Body Composition/Growth/Weight History  Monitoring and Evaluation Plan: Weight  Weight: Measured weight  Criteria: Maintain stable  weight    Biochemical Data, Medical Tests and Procedures  Monitoring and Evaluation Plan: Electrolyte/renal panel  Electrolyte and Renal Panel: BUN, Creatinine  Criteria: Renal function to improve to baseline.    Nutrition Focused Physical Findings  Monitoring and Evaluation Plan: Digestive System  Digestive System: Decrease in appetite  Criteria: Appetite to improve         Time Spent (min): 30 minutes

## 2024-10-29 NOTE — ASSESSMENT & PLAN NOTE
4.1 at the time of admission.  No evidence of active bleeding.  Hold Coumadin at this time.  Check repeat coagulation studies with morning blood work

## 2024-10-29 NOTE — NURSING NOTE
Pt admitted to 3111 from ED at 0115.  Pt is highly anxious, won't lie still, can't follow directions.  MD notified and RN administered 5 mg Haldol IM to pt.  Pt began to calm down and RN assessed pt.  Pt was found to have a distended abdomen, firm to touch.  Pt bladder scanned.  Pt found to have >1063 ml urine in his bladder.  MD notified.  RN straight cath'd pt.  Urine returned 1500 ml.  MD informed.  Pt is now calm and relaxed and sleeping.  IVF started.  Call light within reach.  Bed alarm on.

## 2024-10-29 NOTE — H&P
History Of Present Illness  Slim Saldivar is a 89 y.o. male presenting with chief complaint of fall while on Coumadin.  Fall was unwitnessed but patient was able to recount the events leading up to his hospital admission.  Of note, he is best described as alert and oriented x 1 with noted sundowning.  He reports that he was leaning over his bed when he became lightheaded after prolonged leaning causing him to fall backwards and landing on his backside.  He denies any head injury or loss of consciousness.  Denies any pain related to the injury or any difficulties with movement/ambulation.  Initial evaluation at the time of admission revealed a significant FLORIN with a creatinine of 2.99.  Baseline creatinine is approximately 1.50.  Upon arrival to the medical floors, patient's bedside nurse noted abdominal distention and suprapubic distention with bladder scan revealing greater than 1200 cc of retained urine.  Straight cath x 1 produced over 1.5 L of urine and subsequent reduction in patient's abdominal and suprapubic symptoms.     Past Medical History  Past Medical History:   Diagnosis Date    Anemia     Chronic kidney disease     Varicella        Surgical History  Past Surgical History:   Procedure Laterality Date    CIRCUMCISION, PRIMARY      OTHER SURGICAL HISTORY  01/20/2022    Tonsillectomy    TONSILLECTOMY      VASECTOMY      WISDOM TOOTH EXTRACTION          Social History  He reports that he has quit smoking. His smoking use included cigarettes. He has never used smokeless tobacco. He reports that he does not currently use alcohol. He reports that he does not use drugs.    Family History  Family History   Problem Relation Name Age of Onset    Hypertension Mother Vida Saldivar     Stroke Mother Vida Saldivar     Heart disease Father Bird Saldivar     Cancer Sister Ashley Brownlee         Allergies  Patient has no known allergies.    Review of Systems   Unable to perform ROS: Dementia        Physical Exam  Vitals reviewed.  "  Constitutional:       Appearance: Normal appearance.   HENT:      Head: Normocephalic and atraumatic.      Nose: Nose normal.      Mouth/Throat:      Mouth: Mucous membranes are moist.   Eyes:      Extraocular Movements: Extraocular movements intact.      Conjunctiva/sclera: Conjunctivae normal.      Pupils: Pupils are equal, round, and reactive to light.   Cardiovascular:      Rate and Rhythm: Normal rate and regular rhythm.      Pulses: Normal pulses.      Heart sounds: Normal heart sounds.   Pulmonary:      Effort: Pulmonary effort is normal.      Breath sounds: Normal breath sounds.   Abdominal:      General: Bowel sounds are normal.      Palpations: Abdomen is soft.   Musculoskeletal:         General: Normal range of motion.      Cervical back: Normal range of motion and neck supple.   Skin:     General: Skin is warm and dry.   Neurological:      General: No focal deficit present.      Mental Status: He is alert. Mental status is at baseline.   Psychiatric:         Mood and Affect: Mood normal.         Behavior: Behavior normal.          Last Recorded Vitals  Blood pressure (!) 146/101, pulse 86, temperature 36.5 °C (97.7 °F), temperature source Temporal, resp. rate 11, height 1.702 m (5' 7\"), weight 63.5 kg (140 lb), SpO2 97%.    Relevant Results  Scheduled medications  pantoprazole, 40 mg, oral, Daily before breakfast   Or  pantoprazole, 40 mg, intravenous, Daily before breakfast  tamsulosin, 0.4 mg, oral, Daily      Continuous medications     PRN medications  PRN medications: acetaminophen **OR** acetaminophen **OR** acetaminophen, acetaminophen **OR** acetaminophen **OR** acetaminophen, melatonin, ondansetron ODT **OR** ondansetron, QUEtiapine  CT head wo IV contrast    Result Date: 10/28/2024  Interpreted By:  Saira Bloom, STUDY: CT HEAD WO IV CONTRAST; CT CERVICAL SPINE WO IV CONTRAST;  10/28/2024 10:30 pm   INDICATION: Signs/Symptoms:fall on coumadin.   COMPARISON: CT head 07/06/2024   ACCESSION " NUMBER(S): [id number]; [id number]   ORDERING CLINICIAN: MICHELLE CAMARENA   TECHNIQUE: Axial noncontrast images of the head  with coronal  and sagittal reconstructed images . Axial noncontrast images of the cervical spine with coronal and sagittal reconstructed images.   FINDINGS: BRAIN PARENCHYMA: There are periventricular white matter hypodensities, probably representing chronic microvascular ischemic changes. Otherwise, the gray-white matter interfaces are preserved. No acute territorial infarct, mass effect or midline shift. HEMORRHAGE: No acute intracranial hemorrhage. VENTRICLES and EXTRA-AXIAL SPACES: Prominent, consistent with diffuse parenchymal volume loss. No extra-axial fluid collection. EXTRACRANIAL SOFT TISSUES: Within normal limits. CALVARIUM: No depressed calvarial fracture. PARANASAL SINUSES: No air-fluid levels. MASTOIDS: Within normal limits.   CERVICAL SPINE: ALIGNMENT: Within normal limits. VERTEBRAE: No acute fracture. DISCS: There is multilevel degenerative disc disease with osteophytosis. There is multilevel facet arthropathy. PREVERTEBRAL SOFT TISSUES: No prevertebral soft tissue swelling. LUNG APICES: No acute findings at the visualized lung apices.         1.  No acute intracranial hemorrhage or depressed calvarial fracture. 2.  No acute fracture at the cervical spine. Degenerative changes.       MACRO: None.   Signed by: Saira Bloom 10/28/2024 11:05 PM Dictation workstation:   [id number]    CT cervical spine wo IV contrast    Result Date: 10/28/2024  Interpreted By:  Saira Bloom, STUDY: CT HEAD WO IV CONTRAST; CT CERVICAL SPINE WO IV CONTRAST;  10/28/2024 10:30 pm   INDICATION: Signs/Symptoms:fall on coumadin.   COMPARISON: CT head 07/06/2024   ACCESSION NUMBER(S): [id number]; [id number]   ORDERING CLINICIAN: MICHELLE CAMARENA   TECHNIQUE: Axial noncontrast images of the head  with coronal  and sagittal reconstructed images . Axial noncontrast images of the cervical spine with  coronal and sagittal reconstructed images.   FINDINGS: BRAIN PARENCHYMA: There are periventricular white matter hypodensities, probably representing chronic microvascular ischemic changes. Otherwise, the gray-white matter interfaces are preserved. No acute territorial infarct, mass effect or midline shift. HEMORRHAGE: No acute intracranial hemorrhage. VENTRICLES and EXTRA-AXIAL SPACES: Prominent, consistent with diffuse parenchymal volume loss. No extra-axial fluid collection. EXTRACRANIAL SOFT TISSUES: Within normal limits. CALVARIUM: No depressed calvarial fracture. PARANASAL SINUSES: No air-fluid levels. MASTOIDS: Within normal limits.   CERVICAL SPINE: ALIGNMENT: Within normal limits. VERTEBRAE: No acute fracture. DISCS: There is multilevel degenerative disc disease with osteophytosis. There is multilevel facet arthropathy. PREVERTEBRAL SOFT TISSUES: No prevertebral soft tissue swelling. LUNG APICES: No acute findings at the visualized lung apices.         1.  No acute intracranial hemorrhage or depressed calvarial fracture. 2.  No acute fracture at the cervical spine. Degenerative changes.       MACRO: None.   Signed by: Saira Bloom 10/28/2024 11:05 PM Dictation workstation:   [id number]    XR chest 1 view    Result Date: 10/28/2024  Interpreted By:  Sarmad Corbett, STUDY: XR CHEST 1 VIEW;  10/28/2024 10:00 pm   INDICATION: Signs/Symptoms:Chest Pain.   COMPARISON: 7/6/2024   ACCESSION NUMBER(S): [id number]   ORDERING CLINICIAN: MICHELLE CAMARENA   FINDINGS: The cardiac silhouette is stable in size. Mild left basilar subsegmental atelectasis. No significant pleural effusion. No pneumothorax.       Mild left basilar subsegmental atelectasis   MACRO: None   Signed by: Sarmad Corbett 10/28/2024 10:31 PM Dictation workstation:   [id number]   Results for orders placed or performed during the hospital encounter of 10/28/24 (from the past 24 hours)   CBC and Auto Differential   Result Value Ref Range    WBC 6.6 4.4  - 11.3 x10*3/uL    nRBC 0.0 0.0 - 0.0 /100 WBCs    RBC 4.20 (L) 4.50 - 5.90 x10*6/uL    Hemoglobin 13.0 (L) 13.5 - 17.5 g/dL    Hematocrit 39.3 (L) 41.0 - 52.0 %    MCV 94 80 - 100 fL    MCH 31.0 26.0 - 34.0 pg    MCHC 33.1 32.0 - 36.0 g/dL    RDW 14.4 11.5 - 14.5 %    Platelets 201 150 - 450 x10*3/uL    Neutrophils % 58.7 40.0 - 80.0 %    Immature Granulocytes %, Automated 0.5 0.0 - 0.9 %    Lymphocytes % 25.2 13.0 - 44.0 %    Monocytes % 12.1 2.0 - 10.0 %    Eosinophils % 3.0 0.0 - 6.0 %    Basophils % 0.5 0.0 - 2.0 %    Neutrophils Absolute 3.87 1.60 - 5.50 x10*3/uL    Immature Granulocytes Absolute, Automated 0.03 0.00 - 0.50 x10*3/uL    Lymphocytes Absolute 1.66 0.80 - 3.00 x10*3/uL    Monocytes Absolute 0.80 0.05 - 0.80 x10*3/uL    Eosinophils Absolute 0.20 0.00 - 0.40 x10*3/uL    Basophils Absolute 0.03 0.00 - 0.10 x10*3/uL   Comprehensive Metabolic Panel   Result Value Ref Range    Glucose 86 74 - 99 mg/dL    Sodium 139 136 - 145 mmol/L    Potassium 4.0 3.5 - 5.3 mmol/L    Chloride 102 98 - 107 mmol/L    Bicarbonate 26 21 - 32 mmol/L    Anion Gap 15 10 - 20 mmol/L    Urea Nitrogen 33 (H) 6 - 23 mg/dL    Creatinine 2.99 (H) 0.50 - 1.30 mg/dL    eGFR 19 (L) >60 mL/min/1.73m*2    Calcium 8.8 8.6 - 10.3 mg/dL    Albumin 3.8 3.4 - 5.0 g/dL    Alkaline Phosphatase 64 33 - 136 U/L    Total Protein 6.4 6.4 - 8.2 g/dL    AST 20 9 - 39 U/L    Bilirubin, Total 0.7 0.0 - 1.2 mg/dL    ALT 12 10 - 52 U/L   Magnesium   Result Value Ref Range    Magnesium 1.75 1.60 - 2.40 mg/dL   Protime-INR   Result Value Ref Range    Protime 47.2 (H) 9.8 - 12.8 seconds    INR 4.1 (H) 0.9 - 1.1   Troponin I, High Sensitivity   Result Value Ref Range    Troponin I, High Sensitivity 19 0 - 20 ng/L   Urinalysis with Reflex Culture and Microscopic   Result Value Ref Range    Color, Urine Yellow Straw, Yellow    Appearance, Urine Cloudy (N) Clear    Specific Gravity, Urine 1.025 1.005 - 1.035    pH, Urine 5.0 5.0, 5.5, 6.0, 6.5, 7.0, 7.5, 8.0     Protein, Urine NEGATIVE NEGATIVE, 10 (TRACE) mg/dL    Glucose, Urine NEGATIVE NEGATIVE mg/dL    Blood, Urine NEGATIVE NEGATIVE    Ketones, Urine NEGATIVE NEGATIVE mg/dL    Bilirubin, Urine NEGATIVE NEGATIVE    Urobilinogen, Urine NORM NORM mg/dL    Nitrite, Urine NEGATIVE NEGATIVE    Leukocyte Esterase, Urine 250 Dain/µL (A) NEGATIVE   Microscopic Only, Urine   Result Value Ref Range    WBC, Urine 11-20 (A) 1-5, NONE /HPF    RBC, Urine 3-5 NONE, 1-2, 3-5 /HPF    Squamous Epithelial Cells, Urine 1-9 (SPARSE) Reference range not established. /HPF          Assessment/Plan   Assessment & Plan  FLORIN (acute kidney injury) ([facility])  FLORIN with creatinine of 2.99.  Baseline creatinine noted to be approximately 1.50.  Suspected mechanism is postrenal obstruction with patient having greater than 1200 cc of urine on bladder scan and ultimately in excess of 1500 cc of urine once straight cath was performed.    Will start Flomax at this time.  Does have noted BPH on imaging.  If patient continues to demonstrate difficulty with voiding despite initiation of Flomax, would consider urology consultation at that time.  Now that his obstruction has been resolved, monitor renal function with morning labs.  Anticipate interval improvement in creatinine  Dementia with aggressive behavior (Multi)  History of dementia with baseline described as A&O x 1.  His mentation does wax and wane.  One-time dose of as needed Haldol was administered for acute agitation and aggression.  Home medications to be reviewed and reconciled pending verification.  Additional as needed Haldol to be made available for agitation/aggressive features as it relates to his known dementia  Fall  Fall without any immediate traumatic sequelae.  Concern with patient on Coumadin however no evidence of intracranial bleed or other significant injury.  Holding Coumadin at this time due to supratherapeutic INR.  Maintain fall precautions.  PT/OT assessment  Supratherapeutic  INR  4.1 at the time of admission.  No evidence of active bleeding.  Hold Coumadin at this time.  Check repeat coagulation studies with morning blood work    Diet: Regular  Fluids: N/A  DVT prophylaxis: On Coumadin  Telemetry: Not Indicated    Home Medications: Pending review     I spent >75 minutes in the professional and overall care of this patient.      Suraj Gordon, DO

## 2024-10-29 NOTE — ASSESSMENT & PLAN NOTE
4.1 at the time of admission.  No evidence of active bleeding.    Continue to hold Coumadin at this time.  Check repeat coagulation studies with morning blood work

## 2024-10-29 NOTE — CARE PLAN
The patient's goals for the shift include    Problem: Nutrition  Goal: Less than 5 days NPO/clear liquids  Outcome: Progressing  Goal: Oral intake greater than 50%  Outcome: Progressing  Goal: Oral intake greater 75%  Outcome: Progressing  Goal: Consume prescribed supplement  Outcome: Progressing  Goal: Adequate PO fluid intake  Outcome: Progressing  Goal: Nutrition support goals are met within 48 hrs  Outcome: Progressing  Goal: Nutrition support is meeting 75% of nutrient needs  Outcome: Progressing  Goal: BG  mg/dL  Outcome: Progressing  Goal: Lab values WNL  Outcome: Progressing  Goal: Electrolytes WNL  Outcome: Progressing  Goal: Promote healing  Outcome: Progressing  Goal: Maintain stable weight  Outcome: Progressing  Goal: Reduce weight from edema/fluid  Outcome: Progressing  Goal: Gradual weight gain  Outcome: Progressing     Problem: Skin  Goal: Decreased wound size/increased tissue granulation at next dressing change  Outcome: Progressing  Flowsheets (Taken 10/29/2024 0957)  Decreased wound size/increased tissue granulation at next dressing change: Promote sleep for wound healing  Goal: Participates in plan/prevention/treatment measures  Outcome: Progressing  Flowsheets (Taken 10/29/2024 0957)  Participates in plan/prevention/treatment measures:   Discuss with provider PT/OT consult   Elevate heels  Goal: Prevent/manage excess moisture  Outcome: Progressing  Flowsheets (Taken 10/29/2024 0957)  Prevent/manage excess moisture:   Cleanse incontinence/protect with barrier cream   Moisturize dry skin  Goal: Prevent/minimize sheer/friction injuries  Outcome: Progressing  Flowsheets (Taken 10/29/2024 0957)  Prevent/minimize sheer/friction injuries:   Turn/reposition every 2 hours/use positioning/transfer devices   Use pull sheet  Goal: Promote/optimize nutrition  Outcome: Progressing  Flowsheets (Taken 10/29/2024 0957)  Promote/optimize nutrition:   Consume > 50% meals/supplements   Monitor/record intake  including meals  Goal: Promote skin healing  Outcome: Progressing  Flowsheets (Taken 10/29/2024 0957)  Promote skin healing: Turn/reposition every 2 hours/use positioning/transfer devices     Problem: Fall/Injury  Goal: Not fall by end of shift  Outcome: Progressing  Goal: Be free from injury by end of the shift  Outcome: Progressing  Goal: Verbalize understanding of personal risk factors for fall in the hospital  Outcome: Progressing  Goal: Verbalize understanding of risk factor reduction measures to prevent injury from fall in the home  Outcome: Progressing  Goal: Use assistive devices by end of the shift  Outcome: Progressing  Goal: Pace activities to prevent fatigue by end of the shift  Outcome: Progressing     Problem: Pain - Adult  Goal: Verbalizes/displays adequate comfort level or baseline comfort level  Outcome: Progressing     Problem: Safety - Adult  Goal: Free from fall injury  Outcome: Progressing     Problem: Discharge Planning  Goal: Discharge to home or other facility with appropriate resources  Outcome: Progressing     Problem: Chronic Conditions and Co-morbidities  Goal: Patient's chronic conditions and co-morbidity symptoms are monitored and maintained or improved  Outcome: Progressing       The clinical goals for the shift include pt will be free of injury this shift

## 2024-10-30 LAB
ALBUMIN SERPL BCP-MCNC: 3.2 G/DL (ref 3.4–5)
ANION GAP SERPL CALC-SCNC: 13 MMOL/L (ref 10–20)
ANION GAP SERPL CALC-SCNC: 15 MMOL/L (ref 10–20)
BACTERIA UR CULT: NORMAL
BASOPHILS # BLD AUTO: 0.03 X10*3/UL (ref 0–0.1)
BASOPHILS NFR BLD AUTO: 0.3 %
BUN SERPL-MCNC: 23 MG/DL (ref 6–23)
BUN SERPL-MCNC: 24 MG/DL (ref 6–23)
CALCIUM SERPL-MCNC: 8.5 MG/DL (ref 8.6–10.3)
CALCIUM SERPL-MCNC: 8.7 MG/DL (ref 8.6–10.3)
CHLORIDE SERPL-SCNC: 104 MMOL/L (ref 98–107)
CHLORIDE SERPL-SCNC: 105 MMOL/L (ref 98–107)
CO2 SERPL-SCNC: 26 MMOL/L (ref 21–32)
CO2 SERPL-SCNC: 28 MMOL/L (ref 21–32)
CREAT SERPL-MCNC: 1.56 MG/DL (ref 0.5–1.3)
CREAT SERPL-MCNC: 1.66 MG/DL (ref 0.5–1.3)
EGFRCR SERPLBLD CKD-EPI 2021: 39 ML/MIN/1.73M*2
EGFRCR SERPLBLD CKD-EPI 2021: 42 ML/MIN/1.73M*2
EOSINOPHIL # BLD AUTO: 0.02 X10*3/UL (ref 0–0.4)
EOSINOPHIL NFR BLD AUTO: 0.2 %
ERYTHROCYTE [DISTWIDTH] IN BLOOD BY AUTOMATED COUNT: 14.1 % (ref 11.5–14.5)
GLUCOSE SERPL-MCNC: 102 MG/DL (ref 74–99)
GLUCOSE SERPL-MCNC: 98 MG/DL (ref 74–99)
HCT VFR BLD AUTO: 31.8 % (ref 41–52)
HGB BLD-MCNC: 10.2 G/DL (ref 13.5–17.5)
IMM GRANULOCYTES # BLD AUTO: 0.07 X10*3/UL (ref 0–0.5)
IMM GRANULOCYTES NFR BLD AUTO: 0.6 % (ref 0–0.9)
INR PPP: 3.6 (ref 0.9–1.1)
INR PPP: 4.3 (ref 0.9–1.1)
LYMPHOCYTES # BLD AUTO: 0.98 X10*3/UL (ref 0.8–3)
LYMPHOCYTES NFR BLD AUTO: 8.9 %
MCH RBC QN AUTO: 30.2 PG (ref 26–34)
MCHC RBC AUTO-ENTMCNC: 32.1 G/DL (ref 32–36)
MCV RBC AUTO: 94 FL (ref 80–100)
MONOCYTES # BLD AUTO: 1.07 X10*3/UL (ref 0.05–0.8)
MONOCYTES NFR BLD AUTO: 9.7 %
NEUTROPHILS # BLD AUTO: 8.82 X10*3/UL (ref 1.6–5.5)
NEUTROPHILS NFR BLD AUTO: 80.3 %
NRBC BLD-RTO: 0 /100 WBCS (ref 0–0)
PHOSPHATE SERPL-MCNC: 3.3 MG/DL (ref 2.5–4.9)
PLATELET # BLD AUTO: 184 X10*3/UL (ref 150–450)
POTASSIUM SERPL-SCNC: 3.9 MMOL/L (ref 3.5–5.3)
POTASSIUM SERPL-SCNC: 4.1 MMOL/L (ref 3.5–5.3)
PROTHROMBIN TIME: 41.7 SECONDS (ref 9.8–12.8)
PROTHROMBIN TIME: 49.3 SECONDS (ref 9.8–12.8)
RBC # BLD AUTO: 3.38 X10*6/UL (ref 4.5–5.9)
SODIUM SERPL-SCNC: 141 MMOL/L (ref 136–145)
SODIUM SERPL-SCNC: 142 MMOL/L (ref 136–145)
WBC # BLD AUTO: 11 X10*3/UL (ref 4.4–11.3)

## 2024-10-30 PROCEDURE — 1100000001 HC PRIVATE ROOM DAILY

## 2024-10-30 PROCEDURE — 2500000005 HC RX 250 GENERAL PHARMACY W/O HCPCS: Performed by: INTERNAL MEDICINE

## 2024-10-30 PROCEDURE — 36415 COLL VENOUS BLD VENIPUNCTURE: CPT | Performed by: STUDENT IN AN ORGANIZED HEALTH CARE EDUCATION/TRAINING PROGRAM

## 2024-10-30 PROCEDURE — 99231 SBSQ HOSP IP/OBS SF/LOW 25: CPT | Performed by: NURSE PRACTITIONER

## 2024-10-30 PROCEDURE — 85610 PROTHROMBIN TIME: CPT | Performed by: STUDENT IN AN ORGANIZED HEALTH CARE EDUCATION/TRAINING PROGRAM

## 2024-10-30 PROCEDURE — 85025 COMPLETE CBC W/AUTO DIFF WBC: CPT | Performed by: INTERNAL MEDICINE

## 2024-10-30 PROCEDURE — 80048 BASIC METABOLIC PNL TOTAL CA: CPT | Mod: CCI | Performed by: STUDENT IN AN ORGANIZED HEALTH CARE EDUCATION/TRAINING PROGRAM

## 2024-10-30 PROCEDURE — 85610 PROTHROMBIN TIME: CPT | Performed by: INTERNAL MEDICINE

## 2024-10-30 PROCEDURE — 2500000002 HC RX 250 W HCPCS SELF ADMINISTERED DRUGS (ALT 637 FOR MEDICARE OP, ALT 636 FOR OP/ED): Performed by: STUDENT IN AN ORGANIZED HEALTH CARE EDUCATION/TRAINING PROGRAM

## 2024-10-30 PROCEDURE — 99232 SBSQ HOSP IP/OBS MODERATE 35: CPT | Performed by: STUDENT IN AN ORGANIZED HEALTH CARE EDUCATION/TRAINING PROGRAM

## 2024-10-30 PROCEDURE — 2500000001 HC RX 250 WO HCPCS SELF ADMINISTERED DRUGS (ALT 637 FOR MEDICARE OP): Performed by: INTERNAL MEDICINE

## 2024-10-30 PROCEDURE — 2500000004 HC RX 250 GENERAL PHARMACY W/ HCPCS (ALT 636 FOR OP/ED): Performed by: INTERNAL MEDICINE

## 2024-10-30 PROCEDURE — 51700 IRRIGATION OF BLADDER: CPT

## 2024-10-30 PROCEDURE — 2500000002 HC RX 250 W HCPCS SELF ADMINISTERED DRUGS (ALT 637 FOR MEDICARE OP, ALT 636 FOR OP/ED): Performed by: INTERNAL MEDICINE

## 2024-10-30 PROCEDURE — 36415 COLL VENOUS BLD VENIPUNCTURE: CPT | Performed by: INTERNAL MEDICINE

## 2024-10-30 PROCEDURE — 80069 RENAL FUNCTION PANEL: CPT | Performed by: INTERNAL MEDICINE

## 2024-10-30 RX ORDER — ZIPRASIDONE MESYLATE 20 MG/ML
20 INJECTION, POWDER, LYOPHILIZED, FOR SOLUTION INTRAMUSCULAR EVERY 6 HOURS PRN
Status: DISCONTINUED | OUTPATIENT
Start: 2024-10-30 | End: 2024-11-01

## 2024-10-30 RX ORDER — QUETIAPINE FUMARATE 25 MG/1
25 TABLET, FILM COATED ORAL NIGHTLY
Status: DISCONTINUED | OUTPATIENT
Start: 2024-10-30 | End: 2024-10-31

## 2024-10-30 RX ORDER — HALOPERIDOL 5 MG/ML
5 INJECTION INTRAMUSCULAR ONCE
Status: COMPLETED | OUTPATIENT
Start: 2024-10-30 | End: 2024-10-30

## 2024-10-30 ASSESSMENT — PAIN SCALES - PAIN ASSESSMENT IN ADVANCED DEMENTIA (PAINAD)
TOTALSCORE: 2
CONSOLABILITY: NO NEED TO CONSOLE
TOTALSCORE: 0
BREATHING: NORMAL
BODYLANGUAGE: TENSE, DISTRESSED PACING, FIDGETING
FACIALEXPRESSION: SMILING OR INEXPRESSIVE
TOTALSCORE: MEDICATION (SEE MAR);REPOSITIONED
BODYLANGUAGE: RELAXED
FACIALEXPRESSION: SAD, FRIGHTENED, FROWN
CONSOLABILITY: NO NEED TO CONSOLE
BREATHING: NORMAL

## 2024-10-30 ASSESSMENT — COGNITIVE AND FUNCTIONAL STATUS - GENERAL
DRESSING REGULAR LOWER BODY CLOTHING: A LOT
MOVING TO AND FROM BED TO CHAIR: A LOT
EATING MEALS: A LITTLE
DRESSING REGULAR UPPER BODY CLOTHING: A LOT
MOVING FROM LYING ON BACK TO SITTING ON SIDE OF FLAT BED WITH BEDRAILS: A LITTLE
HELP NEEDED FOR BATHING: A LOT
WALKING IN HOSPITAL ROOM: A LOT
MOBILITY SCORE: 14
DAILY ACTIVITIY SCORE: 13
TURNING FROM BACK TO SIDE WHILE IN FLAT BAD: A LITTLE
PERSONAL GROOMING: A LOT
TOILETING: A LOT
CLIMB 3 TO 5 STEPS WITH RAILING: A LOT
STANDING UP FROM CHAIR USING ARMS: A LOT

## 2024-10-30 ASSESSMENT — PAIN - FUNCTIONAL ASSESSMENT: PAIN_FUNCTIONAL_ASSESSMENT: PAINAD (PAIN ASSESSMENT IN ADVANCED DEMENTIA SCALE)

## 2024-10-30 ASSESSMENT — PAIN SCALES - GENERAL: PAINLEVEL_OUTOF10: 0 - NO PAIN

## 2024-10-30 NOTE — ASSESSMENT & PLAN NOTE
FLORIN is improving  Creatinine almost back to baseline  He was having urinary retention  A Calhoun has been placed  Keep Calhoun in  Having intermittent hematuria  On bladder irrigation  Urology following  Appreciate input  Can take off IV fluids

## 2024-10-30 NOTE — CARE PLAN
The clinical goals for the shift include cooperate with care this shift      Problem: Nutrition  Goal: Less than 5 days NPO/clear liquids  Outcome: Progressing  Goal: Oral intake greater than 50%  Outcome: Progressing  Goal: Oral intake greater 75%  Outcome: Progressing  Goal: Consume prescribed supplement  Outcome: Progressing  Goal: Adequate PO fluid intake  Outcome: Progressing  Goal: Nutrition support goals are met within 48 hrs  Outcome: Progressing  Goal: Nutrition support is meeting 75% of nutrient needs  Outcome: Progressing  Goal: BG  mg/dL  Outcome: Progressing  Goal: Lab values WNL  Outcome: Progressing  Goal: Electrolytes WNL  Outcome: Progressing  Goal: Promote healing  Outcome: Progressing  Goal: Maintain stable weight  Outcome: Progressing  Goal: Reduce weight from edema/fluid  Outcome: Progressing  Goal: Gradual weight gain  Outcome: Progressing     Problem: Skin  Goal: Decreased wound size/increased tissue granulation at next dressing change  Outcome: Progressing  Flowsheets (Taken 10/30/2024 1124)  Decreased wound size/increased tissue granulation at next dressing change:   Promote sleep for wound healing   Protective dressings over bony prominences  Goal: Participates in plan/prevention/treatment measures  Outcome: Progressing  Flowsheets (Taken 10/30/2024 1124)  Participates in plan/prevention/treatment measures:   Discuss with provider PT/OT consult   Elevate heels   Increase activity/out of bed for meals  Goal: Prevent/manage excess moisture  Outcome: Progressing  Flowsheets (Taken 10/30/2024 1124)  Prevent/manage excess moisture:   Cleanse incontinence/protect with barrier cream   Moisturize dry skin   Follow provider orders for dressing changes   Monitor for/manage infection if present  Goal: Prevent/minimize sheer/friction injuries  Outcome: Progressing  Flowsheets (Taken 10/30/2024 1124)  Prevent/minimize sheer/friction injuries:   Complete micro-shifts as needed if patient  unable. Adjust patient position to relieve pressure points, not a full turn   Increase activity/out of bed for meals   Use pull sheet   HOB 30 degrees or less   Turn/reposition every 2 hours/use positioning/transfer devices  Goal: Promote/optimize nutrition  Outcome: Progressing  Flowsheets (Taken 10/30/2024 1124)  Promote/optimize nutrition:   Assist with feeding   Monitor/record intake including meals   Consume > 50% meals/supplements   Offer water/supplements/favorite foods   Discuss with provider if NPO > 2 days   Reassess MST if dietician not consulted  Goal: Promote skin healing  Outcome: Progressing  Flowsheets (Taken 10/30/2024 1124)  Promote skin healing:   Assess skin/pad under line(s)/device(s)   Turn/reposition every 2 hours/use positioning/transfer devices   Protective dressings over bony prominences     Problem: Fall/Injury  Goal: Not fall by end of shift  Outcome: Progressing  Goal: Be free from injury by end of the shift  Outcome: Progressing  Goal: Verbalize understanding of personal risk factors for fall in the hospital  Outcome: Progressing  Goal: Verbalize understanding of risk factor reduction measures to prevent injury from fall in the home  Outcome: Progressing  Goal: Use assistive devices by end of the shift  Outcome: Progressing  Goal: Pace activities to prevent fatigue by end of the shift  Outcome: Progressing     Problem: Pain - Adult  Goal: Verbalizes/displays adequate comfort level or baseline comfort level  Outcome: Progressing  Flowsheets (Taken 10/30/2024 1124)  Verbalizes/displays adequate comfort level or baseline comfort level:   Encourage patient to monitor pain and request assistance   Assess pain using appropriate pain scale   Administer analgesics based on type and severity of pain and evaluate response   Implement non-pharmacological measures as appropriate and evaluate response   Consider cultural and social influences on pain and pain management   Notify Licensed Independent  Practitioner if interventions unsuccessful or patient reports new pain     Problem: Safety - Adult  Goal: Free from fall injury  Outcome: Progressing  Flowsheets (Taken 10/30/2024 1124)  Free from fall injury:   Instruct family/caregiver on patient safety   Based on caregiver fall risk screen, instruct family/caregiver to ask for assistance with transferring infant if caregiver noted to have fall risk factors     Problem: Discharge Planning  Goal: Discharge to home or other facility with appropriate resources  Outcome: Progressing  Flowsheets (Taken 10/30/2024 1124)  Discharge to home or other facility with appropriate resources:   Identify barriers to discharge with patient and caregiver   Arrange for needed discharge resources and transportation as appropriate   Identify discharge learning needs (meds, wound care, etc)   Arrange for interpreters to assist at discharge as needed   Refer to discharge planning if patient needs post-hospital services based on physician order or complex needs related to functional status, cognitive ability or social support system     Problem: Chronic Conditions and Co-morbidities  Goal: Patient's chronic conditions and co-morbidity symptoms are monitored and maintained or improved  Outcome: Progressing  Flowsheets (Taken 10/30/2024 1124)  Care Plan - Patient's Chronic Conditions and Co-Morbidity Symptoms are Monitored and Maintained or Improved:   Monitor and assess patient's chronic conditions and comorbid symptoms for stability, deterioration, or improvement   Collaborate with multidisciplinary team to address chronic and comorbid conditions and prevent exacerbation or deterioration   Update acute care plan with appropriate goals if chronic or comorbid symptoms are exacerbated and prevent overall improvement and discharge     Problem: Infection related to problem list condition  Goal: Infection will resolve through treatment  Outcome: Progressing  Flowsheets (Taken 10/30/2024  1124)  Infection will resolve through treatment:   Observe for and document signs and symptoms of infection   Obtain labs/cultures as ordered by provider   Nurse will administer medications as ordered by provider   Administer treatments as ordered by provider

## 2024-10-30 NOTE — ASSESSMENT & PLAN NOTE
Patient has been put on Seroquel as needed, I will change it to a standing dose  Put him on Geodon as needed  Has not needed a sitter, has not needed restraints  Have been intermittently undergo ing sundowning/delirium

## 2024-10-30 NOTE — ASSESSMENT & PLAN NOTE
4.1 at the time of admission.  No evidence of active bleeding.    Continue to hold Coumadin at this time.    Continues to be high, currently at 4.3, will repeat tomorrow  Patient has advanced dementia and is at fall risk we will consider stopping anticoagulation

## 2024-10-30 NOTE — PROGRESS NOTES
Slim Saldivar 89 y.o. male    Subjective  Patient seen and examined this afternoon.  Resting comfortable in bed, sleeping.  Calhoun catheter with yellow to ena colored urine, no clots seen.      Objective  PHYSICAL EXAM:  Physical Exam  Vitals reviewed.   Constitutional:       General: He is awake.      Appearance: Normal appearance.   Cardiovascular:      Rate and Rhythm: Normal rate and regular rhythm.      Pulses: Normal pulses.      Heart sounds: Normal heart sounds.   Pulmonary:      Effort: Pulmonary effort is normal.      Breath sounds: Normal breath sounds and air entry.   Abdominal:      General: Abdomen is flat. There is no distension.      Palpations: Abdomen is soft.      Tenderness: There is no abdominal tenderness. There is no right CVA tenderness or left CVA tenderness.   Genitourinary:     Comments: Calhoun catheter with yellow to ena colored urine, no clots   Musculoskeletal:         General: Normal range of motion.      Cervical back: Normal range of motion.   Skin:     General: Skin is warm and dry.   Neurological:      General: No focal deficit present.      Mental Status: He is alert and oriented to person, place, and time.   Psychiatric:         Behavior: Behavior is cooperative.         Vital signs in last 24 hours:  Vitals:    10/30/24 0800   BP: 109/60   Pulse: 92   Resp: 18   Temp: 36.7 °C (98.1 °F)   SpO2: 92%         Intake/Output this shift:    Intake/Output Summary (Last 24 hours) at 10/30/2024 1239  Last data filed at 10/30/2024 0500  Gross per 24 hour   Intake 1168.75 ml   Output 900 ml   Net 268.75 ml        Allergies:  No Known Allergies     Medications:  Scheduled medications  pantoprazole, 40 mg, oral, Daily before breakfast   Or  pantoprazole, 40 mg, intravenous, Daily before breakfast  QUEtiapine, 25 mg, oral, Nightly  tamsulosin, 0.4 mg, oral, Daily      Continuous medications     PRN medications  PRN medications: acetaminophen **OR** acetaminophen **OR** acetaminophen,  acetaminophen **OR** acetaminophen **OR** acetaminophen, melatonin, ondansetron ODT **OR** ondansetron, ziprasidone       Labs:  Results for orders placed or performed during the hospital encounter of 10/28/24 (from the past 24 hours)   Renal function panel   Result Value Ref Range    Glucose 102 (H) 74 - 99 mg/dL    Sodium 141 136 - 145 mmol/L    Potassium 4.1 3.5 - 5.3 mmol/L    Chloride 104 98 - 107 mmol/L    Bicarbonate 26 21 - 32 mmol/L    Anion Gap 15 10 - 20 mmol/L    Urea Nitrogen 23 6 - 23 mg/dL    Creatinine 1.66 (H) 0.50 - 1.30 mg/dL    eGFR 39 (L) >60 mL/min/1.73m*2    Calcium 8.7 8.6 - 10.3 mg/dL    Phosphorus 3.3 2.5 - 4.9 mg/dL    Albumin 3.2 (L) 3.4 - 5.0 g/dL   Protime-INR   Result Value Ref Range    Protime 49.3 (H) 9.8 - 12.8 seconds    INR 4.3 (H) 0.9 - 1.1   CBC and Auto Differential   Result Value Ref Range    WBC 11.0 4.4 - 11.3 x10*3/uL    nRBC 0.0 0.0 - 0.0 /100 WBCs    RBC 3.38 (L) 4.50 - 5.90 x10*6/uL    Hemoglobin 10.2 (L) 13.5 - 17.5 g/dL    Hematocrit 31.8 (L) 41.0 - 52.0 %    MCV 94 80 - 100 fL    MCH 30.2 26.0 - 34.0 pg    MCHC 32.1 32.0 - 36.0 g/dL    RDW 14.1 11.5 - 14.5 %    Platelets 184 150 - 450 x10*3/uL    Neutrophils % 80.3 40.0 - 80.0 %    Immature Granulocytes %, Automated 0.6 0.0 - 0.9 %    Lymphocytes % 8.9 13.0 - 44.0 %    Monocytes % 9.7 2.0 - 10.0 %    Eosinophils % 0.2 0.0 - 6.0 %    Basophils % 0.3 0.0 - 2.0 %    Neutrophils Absolute 8.82 (H) 1.60 - 5.50 x10*3/uL    Immature Granulocytes Absolute, Automated 0.07 0.00 - 0.50 x10*3/uL    Lymphocytes Absolute 0.98 0.80 - 3.00 x10*3/uL    Monocytes Absolute 1.07 (H) 0.05 - 0.80 x10*3/uL    Eosinophils Absolute 0.02 0.00 - 0.40 x10*3/uL    Basophils Absolute 0.03 0.00 - 0.10 x10*3/uL   Basic metabolic panel   Result Value Ref Range    Glucose 98 74 - 99 mg/dL    Sodium 142 136 - 145 mmol/L    Potassium 3.9 3.5 - 5.3 mmol/L    Chloride 105 98 - 107 mmol/L    Bicarbonate 28 21 - 32 mmol/L    Anion Gap 13 10 - 20 mmol/L     Urea Nitrogen 24 (H) 6 - 23 mg/dL    Creatinine 1.56 (H) 0.50 - 1.30 mg/dL    eGFR 42 (L) >60 mL/min/1.73m*2    Calcium 8.5 (L) 8.6 - 10.3 mg/dL        Imaging:  US renal complete    Result Date: 10/29/2024  Interpreted By:  Alek Concepcion, STUDY: US RENAL COMPLETE;  10/29/2024 3:38 pm   INDICATION: Signs/Symptoms:FLORIN on CKD ? hydronephrosis.     COMPARISON: None.   ACCESSION NUMBER(S): [id number]   ORDERING CLINICIAN: LORETTA CHING   TECHNIQUE: Multiple images of the kidneys were obtained  .   FINDINGS: RIGHT KIDNEY: The right kidney measures 10.0 in length. Few cysts measuring up to 22 mm.. Mild hydronephrosis suspected.   LEFT KIDNEY: The left kidney measures 8.2 in length. 23 mm cyst in the lower pole. Mild hydronephrosis suspected.. 16 mm calculus suspected in the upper pole.   BLADDER: Decompressed by Calhoun catheter. The prostate appears enlarged measuring 4.6 x 4.3 x 4.6 cm, estimated volume 47 mL.       Suspected mild bilateral hydronephrosis of uncertain etiology. Consider CT of the abdomen pelvis for further evaluation. Suspect left nephrolith. Urinary bladder decompressed by Calhoun catheter.   MACRO: None   Signed by: Alek Concepcion 10/29/2024 3:45 PM Dictation workstation:   [id number]    ECG 12 lead    Result Date: 10/29/2024  Atrial fibrillation Abnormal ECG When compared with ECG of 06-JUL-2024 20:30, T wave inversion now evident in Inferior leads    CT head wo IV contrast    Result Date: 10/28/2024  Interpreted By:  Saira Bloom, STUDY: CT HEAD WO IV CONTRAST; CT CERVICAL SPINE WO IV CONTRAST;  10/28/2024 10:30 pm   INDICATION: Signs/Symptoms:fall on coumadin.   COMPARISON: CT head 07/06/2024   ACCESSION NUMBER(S): [id number]; [id number]   ORDERING CLINICIAN: MICHELLE CAMARENA   TECHNIQUE: Axial noncontrast images of the head  with coronal  and sagittal reconstructed images . Axial noncontrast images of the cervical spine with coronal and sagittal reconstructed images.   FINDINGS: BRAIN  PARENCHYMA: There are periventricular white matter hypodensities, probably representing chronic microvascular ischemic changes. Otherwise, the gray-white matter interfaces are preserved. No acute territorial infarct, mass effect or midline shift. HEMORRHAGE: No acute intracranial hemorrhage. VENTRICLES and EXTRA-AXIAL SPACES: Prominent, consistent with diffuse parenchymal volume loss. No extra-axial fluid collection. EXTRACRANIAL SOFT TISSUES: Within normal limits. CALVARIUM: No depressed calvarial fracture. PARANASAL SINUSES: No air-fluid levels. MASTOIDS: Within normal limits.   CERVICAL SPINE: ALIGNMENT: Within normal limits. VERTEBRAE: No acute fracture. DISCS: There is multilevel degenerative disc disease with osteophytosis. There is multilevel facet arthropathy. PREVERTEBRAL SOFT TISSUES: No prevertebral soft tissue swelling. LUNG APICES: No acute findings at the visualized lung apices.         1.  No acute intracranial hemorrhage or depressed calvarial fracture. 2.  No acute fracture at the cervical spine. Degenerative changes.       MACRO: None.   Signed by: Saira Bloom 10/28/2024 11:05 PM Dictation workstation:   [id number]    CT cervical spine wo IV contrast    Result Date: 10/28/2024  Interpreted By:  Saira Bloom, STUDY: CT HEAD WO IV CONTRAST; CT CERVICAL SPINE WO IV CONTRAST;  10/28/2024 10:30 pm   INDICATION: Signs/Symptoms:fall on coumadin.   COMPARISON: CT head 07/06/2024   ACCESSION NUMBER(S): [id number]; [id number]   ORDERING CLINICIAN: MICHELLE CAMARENA   TECHNIQUE: Axial noncontrast images of the head  with coronal  and sagittal reconstructed images . Axial noncontrast images of the cervical spine with coronal and sagittal reconstructed images.   FINDINGS: BRAIN PARENCHYMA: There are periventricular white matter hypodensities, probably representing chronic microvascular ischemic changes. Otherwise, the gray-white matter interfaces are preserved. No acute territorial infarct, mass  effect or midline shift. HEMORRHAGE: No acute intracranial hemorrhage. VENTRICLES and EXTRA-AXIAL SPACES: Prominent, consistent with diffuse parenchymal volume loss. No extra-axial fluid collection. EXTRACRANIAL SOFT TISSUES: Within normal limits. CALVARIUM: No depressed calvarial fracture. PARANASAL SINUSES: No air-fluid levels. MASTOIDS: Within normal limits.   CERVICAL SPINE: ALIGNMENT: Within normal limits. VERTEBRAE: No acute fracture. DISCS: There is multilevel degenerative disc disease with osteophytosis. There is multilevel facet arthropathy. PREVERTEBRAL SOFT TISSUES: No prevertebral soft tissue swelling. LUNG APICES: No acute findings at the visualized lung apices.         1.  No acute intracranial hemorrhage or depressed calvarial fracture. 2.  No acute fracture at the cervical spine. Degenerative changes.       MACRO: None.   Signed by: Saira Bloom 10/28/2024 11:05 PM Dictation workstation:   [id number]    XR chest 1 view    Result Date: 10/28/2024  Interpreted By:  Sarmad Corbett, STUDY: XR CHEST 1 VIEW;  10/28/2024 10:00 pm   INDICATION: Signs/Symptoms:Chest Pain.   COMPARISON: 7/6/2024   ACCESSION NUMBER(S): [id number]   ORDERING CLINICIAN: MICHELLE CAMARENA   FINDINGS: The cardiac silhouette is stable in size. Mild left basilar subsegmental atelectasis. No significant pleural effusion. No pneumothorax.       Mild left basilar subsegmental atelectasis   MACRO: None   Signed by: Sarmad Corbett 10/28/2024 10:31 PM Dictation workstation:   [id number]           Plan  #Hematuria - on coumadin   #Urinary Retention     - Continue to hold anticoagulation.  Most recent INR 4.3 today   - Maintain watts catheter.  Manually irrigate Q4 sterile water until urine clear to light pink and to maintain patency.  - Will plan for TOV prior to discharge and when patient more ambulatory.      #FLORIN on CKD  - BUN/Scr 24/1.56<- 31/2.59.  Baseline creatinine around 1.5.   - Should improve with watts placement.   - Renally  dose medications  - Avoid nephrotoxins      Plan of care discussed.  Further recommendations as per Dr. Hyatt.     JOVON Ramirez-CNP    I spent 15 minutes in the professional and overall care of this patient.

## 2024-10-30 NOTE — PROGRESS NOTES
"Slim Saldivar is a 89 y.o. male on day 1 of admission presenting with FLORIN (acute kidney injury) (CMS-HCC).    Subjective   Patient seen and examined.  The nurse reported earlier that patient was agitated but when I saw him at bedside he was calm, answers questions appropriately, denied any pain, trouble breathing, nausea, vomiting, fever or chills.  Denies any pain in the bladder area.       Objective     Physical Exam  Constitutional:       General: He is not in acute distress.  HENT:      Head: Normocephalic.   Eyes:      Extraocular Movements: Extraocular movements intact.   Cardiovascular:      Rate and Rhythm: Normal rate and regular rhythm.   Pulmonary:      Effort: Pulmonary effort is normal. No respiratory distress.   Abdominal:      Palpations: Abdomen is soft.      Tenderness: There is no abdominal tenderness.   Musculoskeletal:      Cervical back: No rigidity.   Neurological:      Mental Status: He is alert. Mental status is at baseline.         Last Recorded Vitals  Blood pressure 109/60, pulse 92, temperature 36.7 °C (98.1 °F), temperature source Temporal, resp. rate 18, height 1.702 m (5' 7\"), weight 63 kg (139 lb), SpO2 92%.  Intake/Output last 3 Shifts:  I/O last 3 completed shifts:  In: 2397.5 (38 mL/kg) [P.O.:480; I.V.:867.5 (13.8 mL/kg); IV Piggyback:1050]  Out: 3700 (58.7 mL/kg) [Urine:3700 (1.6 mL/kg/hr)]  Weight: 63 kg     Relevant Results                 This patient has a urinary catheter   Reason for the urinary catheter remaining today? neurogenic bladder          Malnutrition Diagnosis Status: New  Malnutrition Diagnosis: Mild malnutrition related to acute disease or injury  As Evidenced by: sudden poor oral intake just prior or during start of admission along with mild to moderate loss of subcutaneous fat and muscle wasting.  I agree with the dietitian's malnutrition diagnosis.      Assessment/Plan   Assessment & Plan  FLORIN (acute kidney injury) (CMS-HCC)  FLORIN is improving  Creatinine " almost back to baseline  He was having urinary retention  A Calhoun has been placed  Keep Calhoun in  Having intermittent hematuria  On bladder irrigation  Urology following  Appreciate input  Can take off IV fluids  Dementia with aggressive behavior (Multi)  Patient has been put on Seroquel as needed, I will change it to a standing dose  Put him on Geodon as needed  Has not needed a sitter, has not needed restraints  Have been intermittently undergo ing sundowning/delirium    Fall  Fall without any immediate traumatic sequelae.  Concern with patient on Coumadin however no evidence of intracranial bleed or other significant injury.  Holding Coumadin at this time due to supratherapeutic INR.  Maintain fall precautions.  PT/OT assessment  Supratherapeutic INR  4.1 at the time of admission.  No evidence of active bleeding.    Continue to hold Coumadin at this time.    Continues to be high, currently at 4.3, will repeat tomorrow  Patient has advanced dementia and is at fall risk we will consider stopping anticoagulation     Acute cystitis  Started on IV antibiotics with ceftriaxone  Urine cultures grew normal vivian, discontinue antibiotics  Trend CBC daily    DVT prophylaxis addressed           Subhash Chapa MD

## 2024-10-30 NOTE — ASSESSMENT & PLAN NOTE
Started on IV antibiotics with ceftriaxone  Urine cultures grew normal vivian, discontinue antibiotics  Trend CBC daily

## 2024-10-30 NOTE — CARE PLAN
Problem: Nutrition  Goal: Less than 5 days NPO/clear liquids  Outcome: Progressing  Goal: Oral intake greater than 50%  Outcome: Progressing  Goal: Oral intake greater 75%  Outcome: Progressing  Goal: Consume prescribed supplement  Outcome: Progressing  Goal: Adequate PO fluid intake  Outcome: Progressing  Goal: Nutrition support goals are met within 48 hrs  Outcome: Progressing  Goal: Nutrition support is meeting 75% of nutrient needs  Outcome: Progressing  Goal: BG  mg/dL  Outcome: Progressing  Goal: Lab values WNL  Outcome: Progressing  Goal: Electrolytes WNL  Outcome: Progressing  Goal: Promote healing  Outcome: Progressing  Goal: Maintain stable weight  Outcome: Progressing  Goal: Reduce weight from edema/fluid  Outcome: Progressing  Goal: Gradual weight gain  Outcome: Progressing     Problem: Skin  Goal: Decreased wound size/increased tissue granulation at next dressing change  Outcome: Progressing  Goal: Participates in plan/prevention/treatment measures  Outcome: Progressing  Goal: Prevent/manage excess moisture  Outcome: Progressing  Goal: Prevent/minimize sheer/friction injuries  Outcome: Progressing  Goal: Promote/optimize nutrition  Outcome: Progressing  Goal: Promote skin healing  Outcome: Progressing     Problem: Fall/Injury  Goal: Not fall by end of shift  Outcome: Progressing  Goal: Be free from injury by end of the shift  Outcome: Progressing  Goal: Verbalize understanding of personal risk factors for fall in the hospital  Outcome: Progressing  Goal: Verbalize understanding of risk factor reduction measures to prevent injury from fall in the home  Outcome: Progressing  Goal: Use assistive devices by end of the shift  Outcome: Progressing  Goal: Pace activities to prevent fatigue by end of the shift  Outcome: Progressing     Problem: Pain - Adult  Goal: Verbalizes/displays adequate comfort level or baseline comfort level  Outcome: Progressing     Problem: Safety - Adult  Goal: Free from fall  injury  Outcome: Progressing     Problem: Discharge Planning  Goal: Discharge to home or other facility with appropriate resources  Outcome: Progressing     Problem: Chronic Conditions and Co-morbidities  Goal: Patient's chronic conditions and co-morbidity symptoms are monitored and maintained or improved  Outcome: Progressing   The patient's goals for the shift include  free from falls    The clinical goals for the shift include pt will be free of injury this shift

## 2024-10-31 PROCEDURE — 2500000001 HC RX 250 WO HCPCS SELF ADMINISTERED DRUGS (ALT 637 FOR MEDICARE OP): Performed by: INTERNAL MEDICINE

## 2024-10-31 PROCEDURE — 1100000001 HC PRIVATE ROOM DAILY

## 2024-10-31 PROCEDURE — 99233 SBSQ HOSP IP/OBS HIGH 50: CPT | Performed by: INTERNAL MEDICINE

## 2024-10-31 PROCEDURE — 2500000004 HC RX 250 GENERAL PHARMACY W/ HCPCS (ALT 636 FOR OP/ED): Performed by: INTERNAL MEDICINE

## 2024-10-31 PROCEDURE — 2500000002 HC RX 250 W HCPCS SELF ADMINISTERED DRUGS (ALT 637 FOR MEDICARE OP, ALT 636 FOR OP/ED): Performed by: INTERNAL MEDICINE

## 2024-10-31 RX ORDER — DEXTROSE, SODIUM CHLORIDE, SODIUM LACTATE, POTASSIUM CHLORIDE, AND CALCIUM CHLORIDE 5; .6; .31; .03; .02 G/100ML; G/100ML; G/100ML; G/100ML; G/100ML
75 INJECTION, SOLUTION INTRAVENOUS CONTINUOUS
Status: ACTIVE | OUTPATIENT
Start: 2024-10-31 | End: 2024-11-01

## 2024-10-31 RX ORDER — ADHESIVE BANDAGE
30 BANDAGE TOPICAL DAILY PRN
Status: DISCONTINUED | OUTPATIENT
Start: 2024-10-31 | End: 2024-11-08 | Stop reason: HOSPADM

## 2024-10-31 RX ORDER — CHOLECALCIFEROL (VITAMIN D3) 25 MCG
2000 TABLET ORAL DAILY
Status: DISCONTINUED | OUTPATIENT
Start: 2024-10-31 | End: 2024-11-08 | Stop reason: HOSPADM

## 2024-10-31 RX ORDER — DIVALPROEX SODIUM 125 MG/1
250 CAPSULE, COATED PELLETS ORAL 2 TIMES DAILY
Status: DISCONTINUED | OUTPATIENT
Start: 2024-10-31 | End: 2024-11-08 | Stop reason: HOSPADM

## 2024-10-31 RX ORDER — QUETIAPINE FUMARATE 25 MG/1
25 TABLET, FILM COATED ORAL NIGHTLY
Status: DISCONTINUED | OUTPATIENT
Start: 2024-10-31 | End: 2024-11-08 | Stop reason: HOSPADM

## 2024-10-31 RX ORDER — VENLAFAXINE 25 MG/1
50 TABLET ORAL DAILY
Status: DISCONTINUED | OUTPATIENT
Start: 2024-10-31 | End: 2024-11-08 | Stop reason: HOSPADM

## 2024-10-31 RX ORDER — WARFARIN 3 MG/1
3 TABLET ORAL DAILY
Status: DISCONTINUED | OUTPATIENT
Start: 2024-10-31 | End: 2024-11-02

## 2024-10-31 RX ORDER — METOPROLOL SUCCINATE 50 MG/1
50 TABLET, EXTENDED RELEASE ORAL DAILY
Status: DISCONTINUED | OUTPATIENT
Start: 2024-10-31 | End: 2024-11-08 | Stop reason: HOSPADM

## 2024-10-31 RX ORDER — ALLOPURINOL 100 MG/1
100 TABLET ORAL DAILY
Status: DISCONTINUED | OUTPATIENT
Start: 2024-10-31 | End: 2024-11-08 | Stop reason: HOSPADM

## 2024-10-31 ASSESSMENT — PAIN - FUNCTIONAL ASSESSMENT
PAIN_FUNCTIONAL_ASSESSMENT: PAINAD (PAIN ASSESSMENT IN ADVANCED DEMENTIA SCALE)

## 2024-10-31 ASSESSMENT — COGNITIVE AND FUNCTIONAL STATUS - GENERAL
MOVING FROM LYING ON BACK TO SITTING ON SIDE OF FLAT BED WITH BEDRAILS: A LITTLE
PERSONAL GROOMING: A LOT
DRESSING REGULAR UPPER BODY CLOTHING: TOTAL
MOVING TO AND FROM BED TO CHAIR: A LOT
CLIMB 3 TO 5 STEPS WITH RAILING: TOTAL
PERSONAL GROOMING: TOTAL
TURNING FROM BACK TO SIDE WHILE IN FLAT BAD: A LOT
WALKING IN HOSPITAL ROOM: A LOT
WALKING IN HOSPITAL ROOM: TOTAL
MOVING TO AND FROM BED TO CHAIR: TOTAL
CLIMB 3 TO 5 STEPS WITH RAILING: TOTAL
HELP NEEDED FOR BATHING: TOTAL
TURNING FROM BACK TO SIDE WHILE IN FLAT BAD: TOTAL
DRESSING REGULAR UPPER BODY CLOTHING: A LOT
TOILETING: TOTAL
DRESSING REGULAR LOWER BODY CLOTHING: TOTAL
MOVING FROM LYING ON BACK TO SITTING ON SIDE OF FLAT BED WITH BEDRAILS: TOTAL
EATING MEALS: TOTAL
TOILETING: A LOT
DAILY ACTIVITIY SCORE: 11
EATING MEALS: TOTAL
STANDING UP FROM CHAIR USING ARMS: A LOT
DRESSING REGULAR LOWER BODY CLOTHING: A LOT
DAILY ACTIVITIY SCORE: 6
HELP NEEDED FOR BATHING: A LOT
MOBILITY SCORE: 6
MOBILITY SCORE: 12
STANDING UP FROM CHAIR USING ARMS: TOTAL

## 2024-10-31 ASSESSMENT — PAIN SCALES - PAIN ASSESSMENT IN ADVANCED DEMENTIA (PAINAD)
FACIALEXPRESSION: SMILING OR INEXPRESSIVE
BREATHING: NORMAL
BODYLANGUAGE: RELAXED
CONSOLABILITY: NO NEED TO CONSOLE
TOTALSCORE: 0
BREATHING: NORMAL
TOTALSCORE: 0
TOTALSCORE: 0
CONSOLABILITY: NO NEED TO CONSOLE
BREATHING: NORMAL
FACIALEXPRESSION: SMILING OR INEXPRESSIVE
TOTALSCORE: 0
CONSOLABILITY: NO NEED TO CONSOLE
BODYLANGUAGE: RELAXED
BREATHING: NORMAL
CONSOLABILITY: NO NEED TO CONSOLE
BODYLANGUAGE: RELAXED
FACIALEXPRESSION: SMILING OR INEXPRESSIVE
BODYLANGUAGE: RELAXED
FACIALEXPRESSION: SMILING OR INEXPRESSIVE

## 2024-10-31 ASSESSMENT — PAIN SCALES - WONG BAKER: WONGBAKER_NUMERICALRESPONSE: NO HURT

## 2024-10-31 NOTE — DOCUMENTATION CLARIFICATION NOTE
"    PATIENT:               SHANNAN LADD  ACCT #:                  [id number]  MRN:                       [id number]  :                       1935  ADMIT DATE:       10/28/2024 9:36 PM  DISCH DATE:  RESPONDING PROVIDER #:        [id number]          PROVIDER RESPONSE TEXT:    Coagulopathy 2/2 Coumadin    CDI QUERY TEXT:    Clarification    Instruction:    Based on your assessment of the patient and the clinical information, please provide the requested documentation by clicking on the appropriate radio button and enter any additional information if prompted.    Question: Is there a diagnosis indicative of the clinical findings and patient symptoms    When answering this query, please exercise your independent professional judgment. The fact that a question is being asked, does not imply that any particular answer is desired or expected.    The patient's clinical indicators include:  Clinical Information: 89 y/o M admitted with FLORIN    Clinical Indicators:    INR:  10/28/24 21:50: 4.1  10/29/24 05:56: 4.1  10/30/24 06:02: 4.3    ED note 10/28 DR. Blunt: \"presents to the ER for fall on Coumadin.  Per EMS the patient fell, landed on his butt, did not hit his head.  Initially the facility-concerned that he may have hit his head, refer on for interview revealed that he was leaning over his bed when he slumped backwards and landed without head injury.  CT head wo IV contrast: No acute intracranial hemorrhage or depressed calvarial fracture.  fall, supratherapeutic INR, FLORIN\"    H/P 10/28 Dr. Gordon and MD PN 10/29 Dr. Garsia: \"Supratherapeutic INR.  4.1 at the time of admission.  No evidence of active bleeding.  Hold Coumadin at this time\"    Treatment: trend INR, hold coumadin    Risk Factors: chronic coumadin use, Supratherapeutic INR  Options provided:  -- Coagulopathy 2/2 Coumadin  -- Other - I will add my own diagnosis  -- Refer to Clinical Documentation Reviewer    Query created by: Catia Ricketts on 10/30/2024 " 9:05 AM      Electronically signed by:  LORETTA CHING MD 10/31/2024 9:57 AM

## 2024-10-31 NOTE — DOCUMENTATION CLARIFICATION NOTE
"    PATIENT:               SHANNAN LADD  ACCT #:                  [id number]  MRN:                       [id number]  :                       1935  ADMIT DATE:       10/28/2024 9:36 PM  DISCH DATE:  RESPONDING PROVIDER #:        [id number]          PROVIDER RESPONSE TEXT:    I agree with dietician diagnosis of Mild malnutrition on 10/29/24    CDI QUERY TEXT:    Clarification    Instruction:    Based on your assessment of the patient and the clinical information, please provide the requested documentation by clicking on the appropriate radio button and enter any additional information if prompted.    Question: Please further clarify this patient nutritional status as    When answering this query, please exercise your independent professional judgment. The fact that a question is being asked, does not imply that any particular answer is desired or expected.    The patient's clinical indicators include:  Clinical Information: 88 y/o M admitted with FLORIN    Clinical Indicators:    BMI: 21.92    dietician PN 10/29: \"Malnutrition Diagnosis: Mild malnutrition related to acute disease or injury As Evidenced by: sudden poor oral intake just prior or during start of admission along with mild to moderate loss of subcutaneous fat and muscle wasting.\"    Treatment: dietician consult, Menu selection assistance, Meal set-up regular diet, ensure compact with every meal    Risk Factors: age, resides at nursing facility, FLORIN, dementia  Options provided:  -- I agree with dietician diagnosis of Mild malnutrition on 10/29/24  -- Other - I will add my own diagnosis  -- Refer to Clinical Documentation Reviewer    Query created by: Catia Ricketts on 10/30/2024 8:46 AM      Electronically signed by:  LORETTA CHING MD 10/31/2024 9:57 AM          "

## 2024-10-31 NOTE — PROGRESS NOTES
Physical Therapy                 Therapy Communication Note    Patient Name: Slim Saldivar  MRN: [id number]  Department: [facility] 3 S  Room: [facility]6/3116-A  Today's Date: 10/31/2024     Discipline: Physical Therapy    Comment:  PT orders received, chart reviewed.  Pt A&O x1 at baseline and is from [facility].  Per [facility], pt's son states pt will return to memory care when medically ready.  Pt without skilled therapy needs at this time.  Will DC PT orders.

## 2024-10-31 NOTE — CARE PLAN
The patient's goals for the shift include comfort, increasing nutrition     The clinical goals for the shift include pt will be cooperatie with care this shift    Over the shift, the patient did not make progress toward the following goals. Barriers to progression include pt not eating, stated not hungry, refusing fluid/food offer. Recommendations to address these barriers include continue to offer fluids,small amt food in small increments.  Enc to take po. Quiet atmosphere    Problem: Nutrition  Goal: Oral intake greater than 50%  Outcome: Not Progressing  Goal: Oral intake greater 75%  Outcome: Not Progressing  Goal: Adequate PO fluid intake  Outcome: Not Progressing  Goal: Nutrition support is meeting 75% of nutrient needs  Outcome: Not Progressing

## 2024-11-01 LAB
ANION GAP SERPL CALC-SCNC: 12 MMOL/L (ref 10–20)
BUN SERPL-MCNC: 22 MG/DL (ref 6–23)
CALCIUM SERPL-MCNC: 8.9 MG/DL (ref 8.6–10.3)
CHLORIDE SERPL-SCNC: 106 MMOL/L (ref 98–107)
CO2 SERPL-SCNC: 29 MMOL/L (ref 21–32)
CREAT SERPL-MCNC: 1.36 MG/DL (ref 0.5–1.3)
EGFRCR SERPLBLD CKD-EPI 2021: 50 ML/MIN/1.73M*2
ERYTHROCYTE [DISTWIDTH] IN BLOOD BY AUTOMATED COUNT: 14.5 % (ref 11.5–14.5)
GLUCOSE SERPL-MCNC: 92 MG/DL (ref 74–99)
HCT VFR BLD AUTO: 30 % (ref 41–52)
HGB BLD-MCNC: 9.7 G/DL (ref 13.5–17.5)
INR PPP: 1.4 (ref 0.9–1.1)
MAGNESIUM SERPL-MCNC: 1.5 MG/DL (ref 1.6–2.4)
MCH RBC QN AUTO: 30.6 PG (ref 26–34)
MCHC RBC AUTO-ENTMCNC: 32.3 G/DL (ref 32–36)
MCV RBC AUTO: 95 FL (ref 80–100)
NRBC BLD-RTO: 0 /100 WBCS (ref 0–0)
PHOSPHATE SERPL-MCNC: 2.8 MG/DL (ref 2.5–4.9)
PLATELET # BLD AUTO: 181 X10*3/UL (ref 150–450)
POTASSIUM SERPL-SCNC: 3.5 MMOL/L (ref 3.5–5.3)
PROTHROMBIN TIME: 15.3 SECONDS (ref 9.8–12.8)
RBC # BLD AUTO: 3.17 X10*6/UL (ref 4.5–5.9)
SODIUM SERPL-SCNC: 143 MMOL/L (ref 136–145)
WBC # BLD AUTO: 8.7 X10*3/UL (ref 4.4–11.3)

## 2024-11-01 PROCEDURE — 2500000004 HC RX 250 GENERAL PHARMACY W/ HCPCS (ALT 636 FOR OP/ED): Performed by: STUDENT IN AN ORGANIZED HEALTH CARE EDUCATION/TRAINING PROGRAM

## 2024-11-01 PROCEDURE — 99232 SBSQ HOSP IP/OBS MODERATE 35: CPT | Performed by: UROLOGY

## 2024-11-01 PROCEDURE — 83735 ASSAY OF MAGNESIUM: CPT | Performed by: STUDENT IN AN ORGANIZED HEALTH CARE EDUCATION/TRAINING PROGRAM

## 2024-11-01 PROCEDURE — 85027 COMPLETE CBC AUTOMATED: CPT | Performed by: STUDENT IN AN ORGANIZED HEALTH CARE EDUCATION/TRAINING PROGRAM

## 2024-11-01 PROCEDURE — 2500000001 HC RX 250 WO HCPCS SELF ADMINISTERED DRUGS (ALT 637 FOR MEDICARE OP): Performed by: STUDENT IN AN ORGANIZED HEALTH CARE EDUCATION/TRAINING PROGRAM

## 2024-11-01 PROCEDURE — 97161 PT EVAL LOW COMPLEX 20 MIN: CPT | Mod: GP

## 2024-11-01 PROCEDURE — 2500000001 HC RX 250 WO HCPCS SELF ADMINISTERED DRUGS (ALT 637 FOR MEDICARE OP): Performed by: INTERNAL MEDICINE

## 2024-11-01 PROCEDURE — 82374 ASSAY BLOOD CARBON DIOXIDE: CPT | Performed by: STUDENT IN AN ORGANIZED HEALTH CARE EDUCATION/TRAINING PROGRAM

## 2024-11-01 PROCEDURE — 36415 COLL VENOUS BLD VENIPUNCTURE: CPT | Performed by: STUDENT IN AN ORGANIZED HEALTH CARE EDUCATION/TRAINING PROGRAM

## 2024-11-01 PROCEDURE — 84100 ASSAY OF PHOSPHORUS: CPT | Performed by: STUDENT IN AN ORGANIZED HEALTH CARE EDUCATION/TRAINING PROGRAM

## 2024-11-01 PROCEDURE — 2500000002 HC RX 250 W HCPCS SELF ADMINISTERED DRUGS (ALT 637 FOR MEDICARE OP, ALT 636 FOR OP/ED): Performed by: STUDENT IN AN ORGANIZED HEALTH CARE EDUCATION/TRAINING PROGRAM

## 2024-11-01 PROCEDURE — 2500000002 HC RX 250 W HCPCS SELF ADMINISTERED DRUGS (ALT 637 FOR MEDICARE OP, ALT 636 FOR OP/ED): Performed by: INTERNAL MEDICINE

## 2024-11-01 PROCEDURE — 2500000005 HC RX 250 GENERAL PHARMACY W/O HCPCS: Performed by: INTERNAL MEDICINE

## 2024-11-01 PROCEDURE — 99233 SBSQ HOSP IP/OBS HIGH 50: CPT | Performed by: STUDENT IN AN ORGANIZED HEALTH CARE EDUCATION/TRAINING PROGRAM

## 2024-11-01 PROCEDURE — 1100000001 HC PRIVATE ROOM DAILY

## 2024-11-01 PROCEDURE — 85610 PROTHROMBIN TIME: CPT | Performed by: STUDENT IN AN ORGANIZED HEALTH CARE EDUCATION/TRAINING PROGRAM

## 2024-11-01 RX ORDER — LORAZEPAM 0.5 MG/1
0.25 TABLET ORAL 2 TIMES DAILY
Status: DISCONTINUED | OUTPATIENT
Start: 2024-11-01 | End: 2024-11-08 | Stop reason: HOSPADM

## 2024-11-01 RX ORDER — DEXTROSE 50 % IN WATER (D50W) INTRAVENOUS SYRINGE
25
Status: DISCONTINUED | OUTPATIENT
Start: 2024-11-01 | End: 2024-11-08 | Stop reason: HOSPADM

## 2024-11-01 RX ORDER — DEXTROSE 50 % IN WATER (D50W) INTRAVENOUS SYRINGE
12.5
Status: DISCONTINUED | OUTPATIENT
Start: 2024-11-01 | End: 2024-11-08 | Stop reason: HOSPADM

## 2024-11-01 RX ORDER — LORAZEPAM 2 MG/ML
0.5 INJECTION INTRAMUSCULAR ONCE
Status: DISCONTINUED | OUTPATIENT
Start: 2024-11-01 | End: 2024-11-01

## 2024-11-01 RX ORDER — MAGNESIUM SULFATE HEPTAHYDRATE 40 MG/ML
2 INJECTION, SOLUTION INTRAVENOUS ONCE
Status: COMPLETED | OUTPATIENT
Start: 2024-11-01 | End: 2024-11-01

## 2024-11-01 RX ORDER — LORAZEPAM 2 MG/ML
0.5 INJECTION INTRAMUSCULAR ONCE
Status: COMPLETED | OUTPATIENT
Start: 2024-11-01 | End: 2024-11-02

## 2024-11-01 ASSESSMENT — PAIN SCALES - PAIN ASSESSMENT IN ADVANCED DEMENTIA (PAINAD)
FACIALEXPRESSION: SMILING OR INEXPRESSIVE
TOTALSCORE: 0
FACIALEXPRESSION: SMILING OR INEXPRESSIVE
BREATHING: NORMAL
BREATHING: NORMAL
TOTALSCORE: 0
CONSOLABILITY: NO NEED TO CONSOLE
CONSOLABILITY: NO NEED TO CONSOLE
BODYLANGUAGE: RELAXED
BODYLANGUAGE: RELAXED

## 2024-11-01 ASSESSMENT — COGNITIVE AND FUNCTIONAL STATUS - GENERAL
PERSONAL GROOMING: A LOT
TOILETING: A LOT
MOVING FROM LYING ON BACK TO SITTING ON SIDE OF FLAT BED WITH BEDRAILS: A LITTLE
MOVING TO AND FROM BED TO CHAIR: A LOT
MOBILITY SCORE: 6
WALKING IN HOSPITAL ROOM: TOTAL
DRESSING REGULAR LOWER BODY CLOTHING: A LOT
TURNING FROM BACK TO SIDE WHILE IN FLAT BAD: A LOT
MOVING FROM LYING ON BACK TO SITTING ON SIDE OF FLAT BED WITH BEDRAILS: TOTAL
TURNING FROM BACK TO SIDE WHILE IN FLAT BAD: TOTAL
DAILY ACTIVITIY SCORE: 12
MOBILITY SCORE: 12
CLIMB 3 TO 5 STEPS WITH RAILING: TOTAL
MOVING TO AND FROM BED TO CHAIR: TOTAL
STANDING UP FROM CHAIR USING ARMS: TOTAL
STANDING UP FROM CHAIR USING ARMS: A LOT
EATING MEALS: A LOT
HELP NEEDED FOR BATHING: A LOT
WALKING IN HOSPITAL ROOM: A LOT
DRESSING REGULAR UPPER BODY CLOTHING: A LOT
CLIMB 3 TO 5 STEPS WITH RAILING: TOTAL

## 2024-11-01 ASSESSMENT — PAIN SCALES - WONG BAKER: WONGBAKER_NUMERICALRESPONSE: NO HURT

## 2024-11-01 ASSESSMENT — PAIN - FUNCTIONAL ASSESSMENT
PAIN_FUNCTIONAL_ASSESSMENT: PAINAD (PAIN ASSESSMENT IN ADVANCED DEMENTIA SCALE)
PAIN_FUNCTIONAL_ASSESSMENT: 0-10
PAIN_FUNCTIONAL_ASSESSMENT: PAINAD (PAIN ASSESSMENT IN ADVANCED DEMENTIA SCALE)
PAIN_FUNCTIONAL_ASSESSMENT: PAINAD (PAIN ASSESSMENT IN ADVANCED DEMENTIA SCALE)

## 2024-11-01 ASSESSMENT — PAIN SCALES - GENERAL
PAINLEVEL_OUTOF10: 7
PAINLEVEL_OUTOF10: 0 - NO PAIN

## 2024-11-01 NOTE — PROGRESS NOTES
Physical Therapy    Physical Therapy Evaluation    Patient Name: Slim Saldivar  MRN: [id number]  Today's Date: 11/1/2024   Time Calculation  Start Time: 1120  Stop Time: 1130  Time Calculation (min): 10 min  3116/3116-A    Assessment/Plan   PT Assessment: Pt demonstrates decreased strength, decreased cognition, and impaired balance/mobility.  Pt appears below baseline level of function and based on current level of function, pt would benefit from continued skilled therapy while in the hospital to ensure safety, decrease risk of falls, and regain strength/mobility back to baseline.  Once stable enough for discharge, pt may benefit from moderate intensity therapy trial.    PT Assessment Results: Decreased strength, Impaired balance, Decreased mobility, Decreased cognition, Decreased safety awareness  Rehab Prognosis: Fair (2/2 to cognition)  Evaluation/Treatment Tolerance: Patient limited by fatigue  Medical Staff Made Aware: Yes  End of Session Communication: Bedside nurse  End of Session Patient Position: Bed, 3 rail up, Alarm on (RN and nursing student in room)  IP OR SWING BED PT PLAN  Inpatient or Swing Bed: Inpatient  PT Plan  Treatment/Interventions: Bed mobility, Transfer training, Gait training, Balance training, Neuromuscular re-education, Strengthening, Range of motion, Therapeutic exercise, Therapeutic activity, Home exercise program  PT Plan: Ongoing PT  PT Frequency: 2 times per week  PT Discharge Recommendations: Moderate intensity level of continued care (SNF trial)  Equipment Recommended upon Discharge:  (LRAD)  PT Recommended Transfer Status: Total assist (x2)  PT - OK to Discharge: Yes - To next level of care when cleared by medical team      Subjective     Current Problem:  1. FLORIN (acute kidney injury) (CMS-HCC)        2. Fall, initial encounter        3. Supratherapeutic INR            Past Medical History:  Patient Active Problem List   Diagnosis    Chronic idiopathic gout involving toe without  [facility]    Moderate protein-calorie malnutrition (Multi)    Dementia with aggressive behavior (Multi)    Stage 3b chronic kidney disease (Multi)    AMS (altered mental status)    Supratherapeutic INR    Atrial fibrillation (Multi)    Anemia    Hyperlipidemia    Hyperparathyroidism, secondary (Multi)    Vitamin D deficiency    Mitral valve disorder    FLORIN (acute kidney injury) (CMS-[facility])    Fall    Acute cystitis       General Visit Information:  Per EMR: pt presenting with chief complaint of fall while on Coumadin.  Fall was unwitnessed but patient was able to recount the events leading up to his hospital admission.  Of note, he is best described as alert and oriented x 1 with noted sundowning.  He reports that he was leaning over his bed when he became lightheaded after prolonged leaning causing him to fall backwards and landing on his backside.  He denies any head injury or loss of consciousness.  Denies any pain related to the injury or any difficulties with movement/ambulation.  Initial evaluation at the time of admission revealed a significant FLORIN with a creatinine of 2.99.  Baseline creatinine is approximately 1.50.  Upon arrival to the medical floors, patient's bedside nurse noted abdominal distention and suprapubic distention with bladder scan revealing greater than 1200 cc of retained urine.  Straight cath x 1 produced over 1.5 L of urine and subsequent reduction in patient's abdominal and suprapubic symptoms.     On arrival, pt supine in bed.  Pt in no apparent distress and agreeable to therapy.    General  Reason for Referral: impaired mobility, gait training  Referred By: Anju Salomon  Prior to Session Communication: Bedside nurse  Patient Position Received: Bed, 3 rail up, Alarm on    Home Living/PLOF:  Pt unable to provide any history.  Per EMR, pt is from [facility] and seems as though pt ambulates at baseline (unsure if uses device).    Precautions:  Precautions  Medical Precautions: Fall  precautions     Objective     Pain:  Pain Assessment  Pain Assessment: 0-10  0-10 (Numeric) Pain Score: 0 - No pain    Cognition:  Cognition  Overall Cognitive Status: Impaired at baseline  Orientation Level: Disoriented to situation, Disoriented to time, Disoriented to place (requires VCs for last name but able to state it)    General Assessments:      Activity Tolerance  Endurance: Decreased tolerance for upright activites    Static Sitting Balance  Static Sitting-Comment/Number of Minutes: poor  Dynamic Sitting Balance  Dynamic Sitting-Comments: poor     Extremity/Trunk Assessments:  Unable to assess    Functional Mobility:  Bed mobility  Supine to sit: Dep A x2 using draw sheet; pt unable to sit EOB without Max-Dep A; pt's body is rigid during transfer with retro lean in sitting  Sit to supine: Dep A x2 using draw sheet  Boost towards HOB: Dep A x2 using draw sheet    Transfers/Ambulation/Stairs  Not attempted 2/2 to safety and assist level    Outcome Measures:  [facility] Basic Mobility  Turning from your back to your side while in a flat bed without using bedrails: Total  Moving from lying on your back to sitting on the side of a flat bed without using bedrails: Total  Moving to and from bed to chair (including a wheelchair): Total  Standing up from a chair using your arms (e.g. wheelchair or bedside chair): Total  To walk in hospital room: Total  Climbing 3-5 steps with railing: Total  Basic Mobility - Total Score: 6    Goals:  Encounter Problems       Encounter Problems (Active)       PT Problem       Pt will be able to perform all bed mobility tasks with Mod A.  (Progressing)       Start:  11/01/24    Expected End:  11/15/24            Pt will perform all transfers with Mod A and LRAD with proper safety mechanics.   (Progressing)       Start:  11/01/24    Expected End:  11/15/24            Pt will ambulate 30 ft with Mod A using LRAD for improved functional independence.  (Progressing)       Start:  11/01/24     Expected End:  11/15/24                 Education Documentation  Body Mechanics, taught by Linda Hernandez, PT at 11/1/2024  1:03 PM.  Learner: Patient  Readiness: Acceptance  Method: Explanation  Response: No Evidence of Learning    Home Exercise Program, taught by Linda Hernandez, PT at 11/1/2024  1:03 PM.  Learner: Patient  Readiness: Acceptance  Method: Explanation  Response: No Evidence of Learning    Mobility Training, taught by Linda Hernandez, PT at 11/1/2024  1:03 PM.  Learner: Patient  Readiness: Acceptance  Method: Explanation  Response: No Evidence of Learning    Education Comments  No comments found.

## 2024-11-01 NOTE — PROGRESS NOTES
11/01/24 1034   Discharge Planning   Support Systems Family members;Children   Care Facility Name [facility] Memory   Home or Post Acute Services In home services   Type of Post Acute Facility Services Assisted living   Type of Home Care Services Home health aide   Expected Discharge Disposition Home     Voice mail left for son regarding his father's discharge plan.     1150: SonHerbert returned my call, we discussed his father's increased need for assistance for daily care, ambulation and transfers. He wants his father to be able to return to [facility] and will transport when he is discharged, but is concerned with the increase in assistance needs. He would like to speak with Dr. Salomon , message sent for her to call him for updates.

## 2024-11-01 NOTE — PROGRESS NOTES
"Slim Saldivar is a 89 y.o. male on day 3 of admission presenting with FLORIN (acute kidney injury) (CMS-[facility]).    Subjective   Patient seen and examined.  He looks slightly dehydrated.  Patient is taking much orally according to bedside nurse.  A and O x 1 but saturating well on room air.  Denies any chest pain, abdominal pain, suprapubic fullness/pain or shortness of breath.  Urine bag showing blood-tinged urine.           Objective     Physical Exam  Constitutional:       General: He is not in acute distress.  HENT:      Head: Normocephalic.   Eyes:      Extraocular Movements: Extraocular movements intact.   Cardiovascular:      Rate and Rhythm: Normal rate and regular rhythm.   Pulmonary:      Effort: Pulmonary effort is normal. No respiratory distress.   Abdominal:      Palpations: Abdomen is soft.      Tenderness: There is no abdominal tenderness.   Genitourinary:     Comments: Calhoun Catheter in situ draining blood-tinged urine  Musculoskeletal:      Cervical back: No rigidity.   Neurological:      Mental Status: He is alert. Mental status is at baseline.         Last Recorded Vitals  Blood pressure 129/71, pulse 82, temperature 36 °C (96.8 °F), temperature source Temporal, resp. rate 18, height 1.702 m (5' 7\"), weight 63 kg (139 lb), SpO2 93%.  Intake/Output last 3 Shifts:  I/O last 3 completed shifts:  In: 1783.8 (28.3 mL/kg) [P.O.:755; I.V.:778.8 (12.4 mL/kg); IV Piggyback:250]  Out: 850 (13.5 mL/kg) [Urine:850 (0.4 mL/kg/hr)]  Weight: 63 kg     Relevant Results                 This patient has a urinary catheter   Reason for the urinary catheter remaining today? neurogenic bladder           Malnutrition Diagnosis Status: New  Malnutrition Diagnosis: Mild malnutrition related to acute disease or injury  As Evidenced by: sudden poor oral intake just prior or during start of admission along with mild to moderate loss of subcutaneous fat and muscle wasting.  I agree with the dietitian's malnutrition " diagnosis.      Assessment/Plan   Assessment & Plan  FLORIN (acute kidney injury) (CMS-[facility])  FLORIN is improving  Creatinine almost back to baseline  He was having urinary retention  A Calhoun has been placed  Keep Calhoun in  Having intermittent hematuria  On bladder irrigation  Urology following  Appreciate input  IV fluid bolus loss maintenance x 1 more back.  Dementia with aggressive behavior (Multi)  Patient has been put on Seroquel as needed, I will change it to a standing dose  Put him on Geodon as needed  Has not needed a sitter, has not needed restraints  Have been intermittently undergo ing sundowning/delirium    Fall  Fall without any immediate traumatic sequelae.  Concern with patient on Coumadin however no evidence of intracranial bleed or other significant injury.  Holding Coumadin at this time due to supratherapeutic INR.  Maintain fall precautions.  PT/OT assessment  Supratherapeutic INR  4.1 at the time of admission.  No evidence of active bleeding.    Continue to hold Coumadin at this time.    Continues to be high, currently at 3.6  Continue to trend  Patient has advanced dementia and is at fall risk we will consider stopping anticoagulation     Acute cystitis  Started on IV antibiotics with ceftriaxone  Urine cultures grew normal vivian, antibiotics discontinued  Trend CBC daily    DVT prophylaxis   Coumadin on hold since INR remains supratherapeutic         Spent 35 minutes in follow-up management of this patient today      Chivo Garsia MD

## 2024-11-01 NOTE — PROGRESS NOTES
Slim Saldivar 89 y.o. male    Subjective  Patient seen and examined, resting comfortably in bed.  No acute problems reported.    Objective  PHYSICAL EXAM:  Physical Exam  Vitals reviewed.   Constitutional:       General: He is awake.      Appearance: Normal appearance.   Cardiovascular:      Rate and Rhythm: Normal rate and regular rhythm.      Pulses: Normal pulses.      Heart sounds: Normal heart sounds.   Pulmonary:      Effort: Pulmonary effort is normal.      Breath sounds: Normal breath sounds and air entry.   Abdominal:      General: Abdomen is flat. There is no distension.      Palpations: Abdomen is soft.      Tenderness: There is no abdominal tenderness. There is no right CVA tenderness or left CVA tenderness.   Genitourinary:     Comments: Calhoun catheter with yellow to ena colored urine, no clots   Musculoskeletal:         General: Normal range of motion.      Cervical back: Normal range of motion.   Skin:     General: Skin is warm and dry.   Neurological:      General: No focal deficit present.      Mental Status: He is alert and oriented to person, place, and time.   Psychiatric:         Behavior: Behavior is cooperative.         Vital signs in last 24 hours:  Vitals:    11/01/24 1200   BP: 126/57   Pulse: 93   Resp: 18   Temp: 36.1 °C (97 °F)   SpO2: 94%         Intake/Output this shift:    Intake/Output Summary (Last 24 hours) at 11/1/2024 1238  Last data filed at 11/1/2024 0800  Gross per 24 hour   Intake 1468.75 ml   Output 750 ml   Net 718.75 ml        Allergies:  No Known Allergies     Medications:  Scheduled medications  allopurinol, 100 mg, oral, Daily  cholecalciferol, 2,000 Units, oral, Daily  [Held by provider] divalproex sprinkle, 250 mg, oral, BID  metoprolol succinate XL, 50 mg, oral, Daily  pantoprazole, 40 mg, oral, Daily before breakfast   Or  pantoprazole, 40 mg, intravenous, Daily before breakfast  [Held by provider] QUEtiapine, 25 mg, oral, Nightly  tamsulosin, 0.4 mg, oral,  Daily  [Held by provider] venlafaxine, 50 mg, oral, Daily  [Held by provider] warfarin, 3 mg, oral, Daily      Continuous medications  dextrose 5 % and lactated Ringer's, 75 mL/hr, Last Rate: Stopped (11/01/24 0926)      PRN medications  PRN medications: acetaminophen **OR** acetaminophen **OR** acetaminophen, acetaminophen **OR** acetaminophen **OR** acetaminophen, magnesium hydroxide, melatonin, ondansetron ODT **OR** ondansetron, ziprasidone       Labs:  Results for orders placed or performed during the hospital encounter of 10/28/24 (from the past 24 hours)   CBC   Result Value Ref Range    WBC 8.7 4.4 - 11.3 x10*3/uL    nRBC 0.0 0.0 - 0.0 /100 WBCs    RBC 3.17 (L) 4.50 - 5.90 x10*6/uL    Hemoglobin 9.7 (L) 13.5 - 17.5 g/dL    Hematocrit 30.0 (L) 41.0 - 52.0 %    MCV 95 80 - 100 fL    MCH 30.6 26.0 - 34.0 pg    MCHC 32.3 32.0 - 36.0 g/dL    RDW 14.5 11.5 - 14.5 %    Platelets 181 150 - 450 x10*3/uL   Basic Metabolic Panel   Result Value Ref Range    Glucose 92 74 - 99 mg/dL    Sodium 143 136 - 145 mmol/L    Potassium 3.5 3.5 - 5.3 mmol/L    Chloride 106 98 - 107 mmol/L    Bicarbonate 29 21 - 32 mmol/L    Anion Gap 12 10 - 20 mmol/L    Urea Nitrogen 22 6 - 23 mg/dL    Creatinine 1.36 (H) 0.50 - 1.30 mg/dL    eGFR 50 (L) >60 mL/min/1.73m*2    Calcium 8.9 8.6 - 10.3 mg/dL   Magnesium   Result Value Ref Range    Magnesium 1.50 (L) 1.60 - 2.40 mg/dL   Phosphorus   Result Value Ref Range    Phosphorus 2.8 2.5 - 4.9 mg/dL        Imaging:  US renal complete    Result Date: 10/29/2024  Interpreted By:  Alek Concepcion, STUDY: US RENAL COMPLETE;  10/29/2024 3:38 pm   INDICATION: Signs/Symptoms:FLORIN on CKD ? hydronephrosis.     COMPARISON: None.   ACCESSION NUMBER(S): [id number]   ORDERING CLINICIAN: LORETTA CHING   TECHNIQUE: Multiple images of the kidneys were obtained  .   FINDINGS: RIGHT KIDNEY: The right kidney measures 10.0 in length. Few cysts measuring up to 22 mm.. Mild hydronephrosis suspected.   LEFT  KIDNEY: The left kidney measures 8.2 in length. 23 mm cyst in the lower pole. Mild hydronephrosis suspected.. 16 mm calculus suspected in the upper pole.   BLADDER: Decompressed by Calhoun catheter. The prostate appears enlarged measuring 4.6 x 4.3 x 4.6 cm, estimated volume 47 mL.       Suspected mild bilateral hydronephrosis of uncertain etiology. Consider CT of the abdomen pelvis for further evaluation. Suspect left nephrolith. Urinary bladder decompressed by Calhoun catheter.   MACRO: None   Signed by: Alek Concepcion 10/29/2024 3:45 PM Dictation workstation:   [id number]    ECG 12 lead    Result Date: 10/29/2024  Atrial fibrillation Abnormal ECG When compared with ECG of 06-JUL-2024 20:30, T wave inversion now evident in Inferior leads    CT head wo IV contrast    Result Date: 10/28/2024  Interpreted By:  Saira Bloom, STUDY: CT HEAD WO IV CONTRAST; CT CERVICAL SPINE WO IV CONTRAST;  10/28/2024 10:30 pm   INDICATION: Signs/Symptoms:fall on coumadin.   COMPARISON: CT head 07/06/2024   ACCESSION NUMBER(S): [id number]; [id number]   ORDERING CLINICIAN: MICHELLE CAMARENA   TECHNIQUE: Axial noncontrast images of the head  with coronal  and sagittal reconstructed images . Axial noncontrast images of the cervical spine with coronal and sagittal reconstructed images.   FINDINGS: BRAIN PARENCHYMA: There are periventricular white matter hypodensities, probably representing chronic microvascular ischemic changes. Otherwise, the gray-white matter interfaces are preserved. No acute territorial infarct, mass effect or midline shift. HEMORRHAGE: No acute intracranial hemorrhage. VENTRICLES and EXTRA-AXIAL SPACES: Prominent, consistent with diffuse parenchymal volume loss. No extra-axial fluid collection. EXTRACRANIAL SOFT TISSUES: Within normal limits. CALVARIUM: No depressed calvarial fracture. PARANASAL SINUSES: No air-fluid levels. MASTOIDS: Within normal limits.   CERVICAL SPINE: ALIGNMENT: Within normal limits. VERTEBRAE:  No acute fracture. DISCS: There is multilevel degenerative disc disease with osteophytosis. There is multilevel facet arthropathy. PREVERTEBRAL SOFT TISSUES: No prevertebral soft tissue swelling. LUNG APICES: No acute findings at the visualized lung apices.         1.  No acute intracranial hemorrhage or depressed calvarial fracture. 2.  No acute fracture at the cervical spine. Degenerative changes.       MACRO: None.   Signed by: Saira Bloom 10/28/2024 11:05 PM Dictation workstation:   [id number]    CT cervical spine wo IV contrast    Result Date: 10/28/2024  Interpreted By:  Saira Bloom, STUDY: CT HEAD WO IV CONTRAST; CT CERVICAL SPINE WO IV CONTRAST;  10/28/2024 10:30 pm   INDICATION: Signs/Symptoms:fall on coumadin.   COMPARISON: CT head 07/06/2024   ACCESSION NUMBER(S): [id number]; [id number]   ORDERING CLINICIAN: MICHELLE CAMARENA   TECHNIQUE: Axial noncontrast images of the head  with coronal  and sagittal reconstructed images . Axial noncontrast images of the cervical spine with coronal and sagittal reconstructed images.   FINDINGS: BRAIN PARENCHYMA: There are periventricular white matter hypodensities, probably representing chronic microvascular ischemic changes. Otherwise, the gray-white matter interfaces are preserved. No acute territorial infarct, mass effect or midline shift. HEMORRHAGE: No acute intracranial hemorrhage. VENTRICLES and EXTRA-AXIAL SPACES: Prominent, consistent with diffuse parenchymal volume loss. No extra-axial fluid collection. EXTRACRANIAL SOFT TISSUES: Within normal limits. CALVARIUM: No depressed calvarial fracture. PARANASAL SINUSES: No air-fluid levels. MASTOIDS: Within normal limits.   CERVICAL SPINE: ALIGNMENT: Within normal limits. VERTEBRAE: No acute fracture. DISCS: There is multilevel degenerative disc disease with osteophytosis. There is multilevel facet arthropathy. PREVERTEBRAL SOFT TISSUES: No prevertebral soft tissue swelling. LUNG APICES: No acute findings  at the visualized lung apices.         1.  No acute intracranial hemorrhage or depressed calvarial fracture. 2.  No acute fracture at the cervical spine. Degenerative changes.       MACRO: None.   Signed by: Saira Bloom 10/28/2024 11:05 PM Dictation workstation:   [id number]    XR chest 1 view    Result Date: 10/28/2024  Interpreted By:  Sarmad Corbett, STUDY: XR CHEST 1 VIEW;  10/28/2024 10:00 pm   INDICATION: Signs/Symptoms:Chest Pain.   COMPARISON: 7/6/2024   ACCESSION NUMBER(S): [id number]   ORDERING CLINICIAN: MICHELLE CAMARENA   FINDINGS: The cardiac silhouette is stable in size. Mild left basilar subsegmental atelectasis. No significant pleural effusion. No pneumothorax.       Mild left basilar subsegmental atelectasis   MACRO: None   Signed by: Sarmad Corbett 10/28/2024 10:31 PM Dictation workstation:   [id number]           Plan  #Hematuria - on coumadin   #Urinary Retention  -Okay for trial of void prior to discharge, if postvoid residual greater than 250 cc, please replace Watts catheter and arrange for outpatient urology appointment     #FLORIN on CKD  -Creatinine 1.36  - Should improve with watts placement.   - Renally dose medications  - Avoid nephrotoxins      Ab Hyatt MD

## 2024-11-01 NOTE — PROGRESS NOTES
Spiritual Care Visit    Clinical Encounter Type  Visited With: Patient  Routine Visit: Introduction  Continue Visiting: No                                            Taxonomy  Intended Effects: Promote sense of peace, Preserve dignity and respect, Meaning-making  Methods: Offer spiritual/Anglican support  Interventions: Share words of hope and inspiration, [place]    Patient shared about his Yazdanism ct and Jew.  Patient said he has no family left and he is .   prayed at his request.

## 2024-11-01 NOTE — CARE PLAN
Problem: Nutrition  Goal: Less than 5 days NPO/clear liquids  Outcome: Progressing  Goal: Oral intake greater than 50%  Outcome: Progressing  Goal: Oral intake greater 75%  Outcome: Progressing  Goal: Consume prescribed supplement  Outcome: Progressing  Goal: Adequate PO fluid intake  Outcome: Progressing  Goal: Nutrition support goals are met within 48 hrs  Outcome: Progressing  Goal: Nutrition support is meeting 75% of nutrient needs  Outcome: Progressing  Goal: BG  mg/dL  Outcome: Progressing  Goal: Lab values WNL  Outcome: Progressing  Goal: Electrolytes WNL  Outcome: Progressing  Goal: Promote healing  Outcome: Progressing  Goal: Maintain stable weight  Outcome: Progressing  Goal: Reduce weight from edema/fluid  Outcome: Progressing  Goal: Gradual weight gain  Outcome: Progressing     Problem: Skin  Goal: Decreased wound size/increased tissue granulation at next dressing change  Outcome: Progressing  Goal: Participates in plan/prevention/treatment measures  Outcome: Progressing  Goal: Prevent/manage excess moisture  Outcome: Progressing  Goal: Prevent/minimize sheer/friction injuries  Outcome: Progressing  Goal: Promote/optimize nutrition  Outcome: Progressing  Goal: Promote skin healing  Outcome: Progressing     Problem: Fall/Injury  Goal: Not fall by end of shift  Outcome: Progressing  Goal: Be free from injury by end of the shift  Outcome: Progressing  Goal: Verbalize understanding of personal risk factors for fall in the hospital  Outcome: Progressing  Goal: Verbalize understanding of risk factor reduction measures to prevent injury from fall in the home  Outcome: Progressing  Goal: Use assistive devices by end of the shift  Outcome: Progressing  Goal: Pace activities to prevent fatigue by end of the shift  Outcome: Progressing     Problem: Pain - Adult  Goal: Verbalizes/displays adequate comfort level or baseline comfort level  Outcome: Progressing     Problem: Safety - Adult  Goal: Free from fall  injury  Outcome: Progressing     Problem: Discharge Planning  Goal: Discharge to home or other facility with appropriate resources  Outcome: Progressing     Problem: Chronic Conditions and Co-morbidities  Goal: Patient's chronic conditions and co-morbidity symptoms are monitored and maintained or improved  Outcome: Progressing     Problem: Infection related to problem list condition  Goal: Infection will resolve through treatment  Outcome: Progressing   The patient's goals for the shift include      The clinical goals for the shift include pt will remain safe and free from injury/falls t/o the shift

## 2024-11-01 NOTE — PROGRESS NOTES
"Slim Saldivar is a 89 y.o. male on day 3 of admission presenting with FLORIN (acute kidney injury) (CMS-[facility]).    Subjective   Patient seen examined at bedside.  Patient calm and cooperative with exam but not very interactive just nods yes and no.       Objective     Physical Exam    Constitutional: Well developed, no distress, alert and cooperative  Skin: Warm and dry  Eyes: EOMI  ENMT: mucous membranes slightly dry   Respiratory: Patent airways, CTAB  Cardiovascular: Regular, rate and rhythm  Abdominal: Nondistended, soft, non-tender, +BS  : watts in place with yellow urine   Neuro: awake and alert, nods yes/no appropriately     Last Recorded Vitals  Blood pressure 124/54, pulse 89, temperature 36.7 °C (98.1 °F), temperature source Temporal, resp. rate 18, height 1.702 m (5' 7\"), weight 63 kg (139 lb), SpO2 95%.  Intake/Output last 3 Shifts:  I/O last 3 completed shifts:  In: 1783.8 (28.3 mL/kg) [P.O.:755; I.V.:778.8 (12.4 mL/kg); IV Piggyback:250]  Out: 850 (13.5 mL/kg) [Urine:850 (0.4 mL/kg/hr)]  Weight: 63 kg     Relevant Results  Scheduled medications  allopurinol, 100 mg, oral, Daily  cholecalciferol, 2,000 Units, oral, Daily  divalproex sprinkle, 250 mg, oral, BID  LORazepam, 0.25 mg, oral, BID  metoprolol succinate XL, 50 mg, oral, Daily  pantoprazole, 40 mg, oral, Daily before breakfast   Or  pantoprazole, 40 mg, intravenous, Daily before breakfast  [Held by provider] QUEtiapine, 25 mg, oral, Nightly  tamsulosin, 0.4 mg, oral, Daily  venlafaxine, 50 mg, oral, Daily  warfarin, 3 mg, oral, Daily      Continuous medications  dextrose 5 % and lactated Ringer's, 75 mL/hr, Last Rate: Stopped (11/01/24 0926)      PRN medications  PRN medications: acetaminophen **OR** acetaminophen **OR** acetaminophen, acetaminophen **OR** acetaminophen **OR** acetaminophen, magnesium hydroxide, melatonin, ondansetron ODT **OR** ondansetron, ziprasidone  Results from last 7 days   Lab Units 11/01/24  1117 10/30/24  0602 " 10/29/24  0556   WBC AUTO x10*3/uL 8.7 11.0 7.9   RBC AUTO x10*6/uL 3.17* 3.38* 3.52*   HEMOGLOBIN g/dL 9.7* 10.2* 10.6*     Results from last 7 days   Lab Units 11/01/24  1117 10/30/24  1206 10/30/24  0602 10/29/24  0556 10/28/24  2150   SODIUM mmol/L 143 142 141 141 139   POTASSIUM mmol/L 3.5 3.9 4.1 3.8 4.0   CHLORIDE mmol/L 106 105 104 106 102   CO2 mmol/L 29 28 26 25 26   BUN mg/dL 22 24* 23 31* 33*   CREATININE mg/dL 1.36* 1.56* 1.66* 2.59* 2.99*   CALCIUM mg/dL 8.9 8.5* 8.7 8.3* 8.8   PHOSPHORUS mg/dL 2.8  --  3.3  --   --    MAGNESIUM mg/dL 1.50*  --   --   --  1.75   BILIRUBIN TOTAL mg/dL  --   --   --  0.5 0.7   ALT U/L  --   --   --  9* 12   AST U/L  --   --   --  16 20       No results found.     Assessment/Plan   Principal Problem:    FLORIN (acute kidney injury) (CMS-H[facility])  Active Problems:    Dementia with aggressive behavior (Multi)    Supratherapeutic INR    Fall    Acute cystitis    -watts in place, urine yellow  -urology consulted   -Ucx neg   -Cr 1.36, at baseline of 1.3-1.4  -encourage PO intake   -BS 90s on D5LR, add hypoglycemia protocol and POCT QID and PRN  -A&O x1 at BL   -INR 1.4, resume home coumadin 3mg daily   -f/u AM INR   -resume home depakote, effexor and ativan   -received geodon overnight at 00:39, discontinue   -consider psych consult   -PT/OT recommended moderate intensity, will discuss with son     Dispo: Will likely stay >2 midnights.     Anju Salomon DO   Hospitalist

## 2024-11-01 NOTE — ASSESSMENT & PLAN NOTE
Started on IV antibiotics with ceftriaxone  Urine cultures grew normal vivian, antibiotics discontinued  Trend CBC daily

## 2024-11-01 NOTE — ASSESSMENT & PLAN NOTE
4.1 at the time of admission.  No evidence of active bleeding.    Continue to hold Coumadin at this time.    Continues to be high, currently at 3.6  Continue to trend  Patient has advanced dementia and is at fall risk we will consider stopping anticoagulation

## 2024-11-01 NOTE — ASSESSMENT & PLAN NOTE
FLORIN is improving  Creatinine almost back to baseline  He was having urinary retention  A Calhoun has been placed  Keep Calhoun in  Having intermittent hematuria  On bladder irrigation  Urology following  Appreciate input  IV fluid bolus loss maintenance x 1 more back.

## 2024-11-01 NOTE — CARE PLAN
The clinical goals for the shift include REMAIN SAFE WITH NO FALL/INJURY      Problem: Nutrition  Goal: Less than 5 days NPO/clear liquids  Outcome: Progressing  Goal: Oral intake greater than 50%  Outcome: Progressing  Goal: Oral intake greater 75%  Outcome: Progressing  Goal: Consume prescribed supplement  Outcome: Progressing  Goal: Adequate PO fluid intake  Outcome: Progressing  Goal: Nutrition support goals are met within 48 hrs  Outcome: Progressing  Goal: Nutrition support is meeting 75% of nutrient needs  Outcome: Progressing  Goal: BG  mg/dL  Outcome: Progressing  Goal: Lab values WNL  Outcome: Progressing  Goal: Electrolytes WNL  Outcome: Progressing  Goal: Promote healing  Outcome: Progressing  Goal: Maintain stable weight  Outcome: Progressing  Goal: Reduce weight from edema/fluid  Outcome: Progressing  Goal: Gradual weight gain  Outcome: Progressing     Problem: Fall/Injury  Goal: Not fall by end of shift  Outcome: Progressing  Goal: Be free from injury by end of the shift  Outcome: Progressing  Goal: Verbalize understanding of personal risk factors for fall in the hospital  Outcome: Progressing  Goal: Verbalize understanding of risk factor reduction measures to prevent injury from fall in the home  Outcome: Progressing  Goal: Use assistive devices by end of the shift  Outcome: Progressing  Goal: Pace activities to prevent fatigue by end of the shift  Outcome: Progressing     Problem: Discharge Planning  Goal: Discharge to home or other facility with appropriate resources  Outcome: Progressing     Problem: Chronic Conditions and Co-morbidities  Goal: Patient's chronic conditions and co-morbidity symptoms are monitored and maintained or improved  Outcome: Progressing     Problem: Infection related to problem list condition  Goal: Infection will resolve through treatment  Outcome: Progressing

## 2024-11-02 LAB
ANION GAP SERPL CALC-SCNC: 13 MMOL/L (ref 10–20)
BUN SERPL-MCNC: 26 MG/DL (ref 6–23)
CALCIUM SERPL-MCNC: 8.6 MG/DL (ref 8.6–10.3)
CHLORIDE SERPL-SCNC: 103 MMOL/L (ref 98–107)
CO2 SERPL-SCNC: 26 MMOL/L (ref 21–32)
CREAT SERPL-MCNC: 1.39 MG/DL (ref 0.5–1.3)
EGFRCR SERPLBLD CKD-EPI 2021: 48 ML/MIN/1.73M*2
ERYTHROCYTE [DISTWIDTH] IN BLOOD BY AUTOMATED COUNT: 14.6 % (ref 11.5–14.5)
GLUCOSE SERPL-MCNC: 99 MG/DL (ref 74–99)
HCT VFR BLD AUTO: 27.9 % (ref 41–52)
HGB BLD-MCNC: 8.9 G/DL (ref 13.5–17.5)
INR PPP: 1.3 (ref 0.9–1.1)
MAGNESIUM SERPL-MCNC: 1.7 MG/DL (ref 1.6–2.4)
MCH RBC QN AUTO: 30.6 PG (ref 26–34)
MCHC RBC AUTO-ENTMCNC: 31.9 G/DL (ref 32–36)
MCV RBC AUTO: 96 FL (ref 80–100)
NRBC BLD-RTO: 0 /100 WBCS (ref 0–0)
PHOSPHATE SERPL-MCNC: 2.6 MG/DL (ref 2.5–4.9)
PLATELET # BLD AUTO: 183 X10*3/UL (ref 150–450)
POTASSIUM SERPL-SCNC: 4.3 MMOL/L (ref 3.5–5.3)
PROTHROMBIN TIME: 14.5 SECONDS (ref 9.8–12.8)
RBC # BLD AUTO: 2.91 X10*6/UL (ref 4.5–5.9)
SODIUM SERPL-SCNC: 138 MMOL/L (ref 136–145)
WBC # BLD AUTO: 11.8 X10*3/UL (ref 4.4–11.3)

## 2024-11-02 PROCEDURE — 82374 ASSAY BLOOD CARBON DIOXIDE: CPT | Performed by: STUDENT IN AN ORGANIZED HEALTH CARE EDUCATION/TRAINING PROGRAM

## 2024-11-02 PROCEDURE — 36415 COLL VENOUS BLD VENIPUNCTURE: CPT | Performed by: STUDENT IN AN ORGANIZED HEALTH CARE EDUCATION/TRAINING PROGRAM

## 2024-11-02 PROCEDURE — 2500000001 HC RX 250 WO HCPCS SELF ADMINISTERED DRUGS (ALT 637 FOR MEDICARE OP): Performed by: STUDENT IN AN ORGANIZED HEALTH CARE EDUCATION/TRAINING PROGRAM

## 2024-11-02 PROCEDURE — 2500000004 HC RX 250 GENERAL PHARMACY W/ HCPCS (ALT 636 FOR OP/ED)

## 2024-11-02 PROCEDURE — 84100 ASSAY OF PHOSPHORUS: CPT | Performed by: STUDENT IN AN ORGANIZED HEALTH CARE EDUCATION/TRAINING PROGRAM

## 2024-11-02 PROCEDURE — 85027 COMPLETE CBC AUTOMATED: CPT | Performed by: STUDENT IN AN ORGANIZED HEALTH CARE EDUCATION/TRAINING PROGRAM

## 2024-11-02 PROCEDURE — 85610 PROTHROMBIN TIME: CPT | Performed by: STUDENT IN AN ORGANIZED HEALTH CARE EDUCATION/TRAINING PROGRAM

## 2024-11-02 PROCEDURE — 2500000001 HC RX 250 WO HCPCS SELF ADMINISTERED DRUGS (ALT 637 FOR MEDICARE OP): Performed by: INTERNAL MEDICINE

## 2024-11-02 PROCEDURE — 99233 SBSQ HOSP IP/OBS HIGH 50: CPT | Performed by: STUDENT IN AN ORGANIZED HEALTH CARE EDUCATION/TRAINING PROGRAM

## 2024-11-02 PROCEDURE — 1100000001 HC PRIVATE ROOM DAILY

## 2024-11-02 PROCEDURE — 2500000002 HC RX 250 W HCPCS SELF ADMINISTERED DRUGS (ALT 637 FOR MEDICARE OP, ALT 636 FOR OP/ED): Performed by: STUDENT IN AN ORGANIZED HEALTH CARE EDUCATION/TRAINING PROGRAM

## 2024-11-02 PROCEDURE — 83735 ASSAY OF MAGNESIUM: CPT | Performed by: STUDENT IN AN ORGANIZED HEALTH CARE EDUCATION/TRAINING PROGRAM

## 2024-11-02 PROCEDURE — 2500000002 HC RX 250 W HCPCS SELF ADMINISTERED DRUGS (ALT 637 FOR MEDICARE OP, ALT 636 FOR OP/ED): Performed by: INTERNAL MEDICINE

## 2024-11-02 PROCEDURE — 2500000005 HC RX 250 GENERAL PHARMACY W/O HCPCS: Performed by: INTERNAL MEDICINE

## 2024-11-02 RX ORDER — WARFARIN 4 MG/1
4 TABLET ORAL DAILY
Status: DISCONTINUED | OUTPATIENT
Start: 2024-11-02 | End: 2024-11-04

## 2024-11-02 ASSESSMENT — PAIN SCALES - PAIN ASSESSMENT IN ADVANCED DEMENTIA (PAINAD)
FACIALEXPRESSION: SMILING OR INEXPRESSIVE
BODYLANGUAGE: RELAXED
CONSOLABILITY: NO NEED TO CONSOLE
TOTALSCORE: 0
BREATHING: NORMAL

## 2024-11-02 ASSESSMENT — COGNITIVE AND FUNCTIONAL STATUS - GENERAL
EATING MEALS: A LOT
DAILY ACTIVITIY SCORE: 12
HELP NEEDED FOR BATHING: A LOT
PERSONAL GROOMING: A LOT
MOBILITY SCORE: 12
TURNING FROM BACK TO SIDE WHILE IN FLAT BAD: A LOT
CLIMB 3 TO 5 STEPS WITH RAILING: TOTAL
WALKING IN HOSPITAL ROOM: A LOT
STANDING UP FROM CHAIR USING ARMS: A LOT
DRESSING REGULAR LOWER BODY CLOTHING: A LOT
MOVING FROM LYING ON BACK TO SITTING ON SIDE OF FLAT BED WITH BEDRAILS: A LITTLE
MOVING TO AND FROM BED TO CHAIR: A LOT
DRESSING REGULAR UPPER BODY CLOTHING: A LOT
TOILETING: A LOT

## 2024-11-02 ASSESSMENT — PAIN - FUNCTIONAL ASSESSMENT
PAIN_FUNCTIONAL_ASSESSMENT: PAINAD (PAIN ASSESSMENT IN ADVANCED DEMENTIA SCALE)
PAIN_FUNCTIONAL_ASSESSMENT: PAINAD (PAIN ASSESSMENT IN ADVANCED DEMENTIA SCALE)

## 2024-11-02 ASSESSMENT — PAIN SCALES - WONG BAKER: WONGBAKER_NUMERICALRESPONSE: NO HURT

## 2024-11-02 ASSESSMENT — PAIN SCALES - GENERAL
PAINLEVEL_OUTOF10: 0 - NO PAIN
PAINLEVEL_OUTOF10: 0 - NO PAIN

## 2024-11-02 NOTE — CARE PLAN
The patient's goals for the shift include      The clinical goals for the shift include pt will be less agitated this shift    Over the shift, the patient did not make progress toward the following goals. Barriers to progression include dementia with behavioral disturbances, diffiiiculty redirectiong, poor appetite. Recommendations to address these barriers include constant reminders for safety cooperation, offer fluids often, enc eating.    Safety measures maintained    Problem: Nutrition  Goal: Oral intake greater than 50%  Outcome: Not Progressing  Goal: Oral intake greater 75%  Outcome: Not Progressing     Problem: Skin  Goal: Participates in plan/prevention/treatment measures  Outcome: Not Progressing  Goal: Promote/optimize nutrition  Outcome: Not Progressing     Problem: Fall/Injury  Goal: Verbalize understanding of personal risk factors for fall in the hospital  Outcome: Not Progressing

## 2024-11-02 NOTE — PROGRESS NOTES
"Slim Saldivar is a 89 y.o. male on day 4 of admission presenting with FLORIN (acute kidney injury) (CMS-[facility]).    Subjective   Patient seen examined at bedside.  Patient sleeping with sitter at bedside. Easily woken, denies complaints. Cooperative.        Objective     Physical Exam    Constitutional: Well developed, no distress, sleeping comfortably but easily woken, then alert and cooperative, thin   Skin: Warm and dry  Eyes: EOMI, makes eye contact   ENMT: mucous membranes slightly dry   Respiratory: Patent airways, CTAB  Cardiovascular: Regular, rate and rhythm  Abdominal: Nondistended, soft, non-tender, +BS  : watts in place with yellow urine   Neuro: awake and alert, oriented to self and birthday, follows simple commands like wiggling toes and fingers, answers social niceties     Last Recorded Vitals  Blood pressure 89/57, pulse 78, temperature 37.5 °C (99.5 °F), temperature source Temporal, resp. rate 17, height 1.702 m (5' 7\"), weight 63.9 kg (140 lb 14 oz), SpO2 93%.  Intake/Output last 3 Shifts:  I/O last 3 completed shifts:  In: 2225 (34.8 mL/kg) [P.O.:980; I.V.:995 (15.6 mL/kg); IV Piggyback:250]  Out: 790 (12.4 mL/kg) [Urine:790 (0.3 mL/kg/hr)]  Weight: 63.9 kg     Relevant Results  Scheduled medications  allopurinol, 100 mg, oral, Daily  cholecalciferol, 2,000 Units, oral, Daily  divalproex sprinkle, 250 mg, oral, BID  LORazepam, 0.25 mg, oral, BID  metoprolol succinate XL, 50 mg, oral, Daily  pantoprazole, 40 mg, oral, Daily before breakfast   Or  pantoprazole, 40 mg, intravenous, Daily before breakfast  [Held by provider] QUEtiapine, 25 mg, oral, Nightly  tamsulosin, 0.4 mg, oral, Daily  venlafaxine, 50 mg, oral, Daily  warfarin, 4 mg, oral, Daily      Continuous medications       PRN medications  PRN medications: acetaminophen **OR** acetaminophen **OR** acetaminophen, acetaminophen **OR** acetaminophen **OR** acetaminophen, dextrose, dextrose, glucagon, glucagon, magnesium hydroxide, melatonin, " ondansetron ODT **OR** ondansetron  Results from last 7 days   Lab Units 11/02/24  0600 11/01/24  1117 10/30/24  0602   WBC AUTO x10*3/uL 11.8* 8.7 11.0   RBC AUTO x10*6/uL 2.91* 3.17* 3.38*   HEMOGLOBIN g/dL 8.9* 9.7* 10.2*     Results from last 7 days   Lab Units 11/02/24  0559 11/01/24  1117 10/30/24  1206 10/30/24  0602 10/29/24  0556 10/28/24  2150   SODIUM mmol/L 138 143 142 141 141 139   POTASSIUM mmol/L 4.3 3.5 3.9 4.1 3.8 4.0   CHLORIDE mmol/L 103 106 105 104 106 102   CO2 mmol/L 26 29 28 26 25 26   BUN mg/dL 26* 22 24* 23 31* 33*   CREATININE mg/dL 1.39* 1.36* 1.56* 1.66* 2.59* 2.99*   CALCIUM mg/dL 8.6 8.9 8.5* 8.7 8.3* 8.8   PHOSPHORUS mg/dL 2.6 2.8  --  3.3  --   --    MAGNESIUM mg/dL 1.70 1.50*  --   --   --  1.75   BILIRUBIN TOTAL mg/dL  --   --   --   --  0.5 0.7   ALT U/L  --   --   --   --  9* 12   AST U/L  --   --   --   --  16 20       No results found.     Assessment/Plan   Principal Problem:    FLORIN (acute kidney injury) (CMS-[facility])  Active Problems:    Dementia with aggressive behavior (Multi)    Supratherapeutic INR    Fall    Acute cystitis    -watts in place, urine yellow  -urology consulted   -Ucx neg   -voiding trial today, if postvoid residual greater than 250 cc, will replace Watts catheter and arrange for outpatient urology appointment   -Cr 1.39, at baseline of 1.3-1.4  -encourage PO intake   -A&O x1 at BL, appears to be at baseline mentation    -INR 1.3, increase to coumadin 4mg daily   -f/u AM INR   -continue home depakote, effexor and ativan   -will dc sitter  -PT/OT recommended moderate intensity, will ask to re-eval     Dispo: Improving. Voiding trial today. Needs PT/OT re-eval. Anticipate discharge in 24-48hrs.     Anju Salomon DO   Hospitalist

## 2024-11-02 NOTE — CARE PLAN
The patient's goals for the shift include      The clinical goals for the shift include pt will be less agitated this shift

## 2024-11-03 VITALS
HEART RATE: 95 BPM | DIASTOLIC BLOOD PRESSURE: 63 MMHG | WEIGHT: 140.87 LBS | BODY MASS INDEX: 22.11 KG/M2 | TEMPERATURE: 97.3 F | RESPIRATION RATE: 20 BRPM | OXYGEN SATURATION: 95 % | SYSTOLIC BLOOD PRESSURE: 103 MMHG | HEIGHT: 67 IN

## 2024-11-03 LAB
ANION GAP SERPL CALC-SCNC: 12 MMOL/L (ref 10–20)
ATRIAL RATE: 416 BPM
BUN SERPL-MCNC: 30 MG/DL (ref 6–23)
CALCIUM SERPL-MCNC: 8.2 MG/DL (ref 8.6–10.3)
CHLORIDE SERPL-SCNC: 104 MMOL/L (ref 98–107)
CO2 SERPL-SCNC: 27 MMOL/L (ref 21–32)
CREAT SERPL-MCNC: 1.27 MG/DL (ref 0.5–1.3)
EGFRCR SERPLBLD CKD-EPI 2021: 54 ML/MIN/1.73M*2
ERYTHROCYTE [DISTWIDTH] IN BLOOD BY AUTOMATED COUNT: 14.6 % (ref 11.5–14.5)
GLUCOSE BLD MANUAL STRIP-MCNC: 122 MG/DL (ref 74–99)
GLUCOSE BLD MANUAL STRIP-MCNC: 129 MG/DL (ref 74–99)
GLUCOSE BLD MANUAL STRIP-MCNC: 96 MG/DL (ref 74–99)
GLUCOSE SERPL-MCNC: 99 MG/DL (ref 74–99)
HCT VFR BLD AUTO: 24.8 % (ref 41–52)
HGB BLD-MCNC: 8 G/DL (ref 13.5–17.5)
MAGNESIUM SERPL-MCNC: 1.75 MG/DL (ref 1.6–2.4)
MCH RBC QN AUTO: 30.4 PG (ref 26–34)
MCHC RBC AUTO-ENTMCNC: 32.3 G/DL (ref 32–36)
MCV RBC AUTO: 94 FL (ref 80–100)
NRBC BLD-RTO: 0 /100 WBCS (ref 0–0)
PHOSPHATE SERPL-MCNC: 3.1 MG/DL (ref 2.5–4.9)
PLATELET # BLD AUTO: 157 X10*3/UL (ref 150–450)
POTASSIUM SERPL-SCNC: 3.9 MMOL/L (ref 3.5–5.3)
Q ONSET: 224 MS
QRS COUNT: 16 BEATS
QRS DURATION: 82 MS
QT INTERVAL: 362 MS
QTC CALCULATION(BAZETT): 450 MS
QTC FREDERICIA: 419 MS
R AXIS: 67 DEGREES
RBC # BLD AUTO: 2.63 X10*6/UL (ref 4.5–5.9)
SODIUM SERPL-SCNC: 139 MMOL/L (ref 136–145)
T AXIS: 8 DEGREES
T OFFSET: 405 MS
VENTRICULAR RATE: 93 BPM
WBC # BLD AUTO: 7.1 X10*3/UL (ref 4.4–11.3)

## 2024-11-03 PROCEDURE — 2500000002 HC RX 250 W HCPCS SELF ADMINISTERED DRUGS (ALT 637 FOR MEDICARE OP, ALT 636 FOR OP/ED): Performed by: INTERNAL MEDICINE

## 2024-11-03 PROCEDURE — 84100 ASSAY OF PHOSPHORUS: CPT | Performed by: STUDENT IN AN ORGANIZED HEALTH CARE EDUCATION/TRAINING PROGRAM

## 2024-11-03 PROCEDURE — 1100000001 HC PRIVATE ROOM DAILY

## 2024-11-03 PROCEDURE — 92610 EVALUATE SWALLOWING FUNCTION: CPT | Mod: GN | Performed by: SPEECH-LANGUAGE PATHOLOGIST

## 2024-11-03 PROCEDURE — 85027 COMPLETE CBC AUTOMATED: CPT | Performed by: STUDENT IN AN ORGANIZED HEALTH CARE EDUCATION/TRAINING PROGRAM

## 2024-11-03 PROCEDURE — 2500000002 HC RX 250 W HCPCS SELF ADMINISTERED DRUGS (ALT 637 FOR MEDICARE OP, ALT 636 FOR OP/ED): Performed by: STUDENT IN AN ORGANIZED HEALTH CARE EDUCATION/TRAINING PROGRAM

## 2024-11-03 PROCEDURE — 80048 BASIC METABOLIC PNL TOTAL CA: CPT | Performed by: STUDENT IN AN ORGANIZED HEALTH CARE EDUCATION/TRAINING PROGRAM

## 2024-11-03 PROCEDURE — 92526 ORAL FUNCTION THERAPY: CPT | Mod: GN | Performed by: SPEECH-LANGUAGE PATHOLOGIST

## 2024-11-03 PROCEDURE — 2500000001 HC RX 250 WO HCPCS SELF ADMINISTERED DRUGS (ALT 637 FOR MEDICARE OP): Performed by: INTERNAL MEDICINE

## 2024-11-03 PROCEDURE — 2500000001 HC RX 250 WO HCPCS SELF ADMINISTERED DRUGS (ALT 637 FOR MEDICARE OP): Performed by: STUDENT IN AN ORGANIZED HEALTH CARE EDUCATION/TRAINING PROGRAM

## 2024-11-03 PROCEDURE — 82947 ASSAY GLUCOSE BLOOD QUANT: CPT

## 2024-11-03 PROCEDURE — 51702 INSERT TEMP BLADDER CATH: CPT

## 2024-11-03 PROCEDURE — 99233 SBSQ HOSP IP/OBS HIGH 50: CPT | Performed by: STUDENT IN AN ORGANIZED HEALTH CARE EDUCATION/TRAINING PROGRAM

## 2024-11-03 PROCEDURE — 36415 COLL VENOUS BLD VENIPUNCTURE: CPT | Performed by: STUDENT IN AN ORGANIZED HEALTH CARE EDUCATION/TRAINING PROGRAM

## 2024-11-03 PROCEDURE — 83735 ASSAY OF MAGNESIUM: CPT | Performed by: STUDENT IN AN ORGANIZED HEALTH CARE EDUCATION/TRAINING PROGRAM

## 2024-11-03 ASSESSMENT — PAIN SCALES - WONG BAKER: WONGBAKER_NUMERICALRESPONSE: NO HURT

## 2024-11-03 ASSESSMENT — PAIN SCALES - GENERAL
PAINLEVEL_OUTOF10: 0 - NO PAIN
PAINLEVEL_OUTOF10: 0 - NO PAIN

## 2024-11-03 ASSESSMENT — COGNITIVE AND FUNCTIONAL STATUS - GENERAL
MOVING FROM LYING ON BACK TO SITTING ON SIDE OF FLAT BED WITH BEDRAILS: A LITTLE
WALKING IN HOSPITAL ROOM: A LOT
DRESSING REGULAR UPPER BODY CLOTHING: A LOT
MOBILITY SCORE: 12
EATING MEALS: A LITTLE
DRESSING REGULAR LOWER BODY CLOTHING: A LOT
PERSONAL GROOMING: A LOT
DAILY ACTIVITIY SCORE: 13
TURNING FROM BACK TO SIDE WHILE IN FLAT BAD: A LOT
STANDING UP FROM CHAIR USING ARMS: A LOT
HELP NEEDED FOR BATHING: A LOT
CLIMB 3 TO 5 STEPS WITH RAILING: TOTAL
MOVING TO AND FROM BED TO CHAIR: A LOT
TOILETING: A LOT

## 2024-11-03 ASSESSMENT — PAIN SCALES - PAIN ASSESSMENT IN ADVANCED DEMENTIA (PAINAD)
CONSOLABILITY: NO NEED TO CONSOLE
TOTALSCORE: 0
FACIALEXPRESSION: SMILING OR INEXPRESSIVE
BREATHING: NORMAL
BODYLANGUAGE: RELAXED

## 2024-11-03 NOTE — CARE PLAN
The clinical goals for the shift include remain safe with no fall/injury      Problem: Skin  Goal: Decreased wound size/increased tissue granulation at next dressing change  Outcome: Progressing  Flowsheets (Taken 11/3/2024 0434)  Decreased wound size/increased tissue granulation at next dressing change:   Promote sleep for wound healing   Protective dressings over bony prominences  Goal: Participates in plan/prevention/treatment measures  Outcome: Progressing  Flowsheets (Taken 11/3/2024 0434)  Participates in plan/prevention/treatment measures: Elevate heels  Goal: Prevent/manage excess moisture  Outcome: Progressing  Flowsheets (Taken 11/3/2024 0434)  Prevent/manage excess moisture:   Cleanse incontinence/protect with barrier cream   Moisturize dry skin  Goal: Prevent/minimize sheer/friction injuries  Outcome: Progressing  Flowsheets (Taken 11/3/2024 0434)  Prevent/minimize sheer/friction injuries:   Use pull sheet   HOB 30 degrees or less   Turn/reposition every 2 hours/use positioning/transfer devices  Goal: Promote/optimize nutrition  Outcome: Progressing  Goal: Promote skin healing  Outcome: Progressing  Flowsheets (Taken 11/3/2024 0434)  Promote skin healing:   Assess skin/pad under line(s)/device(s)   Protective dressings over bony prominences   Turn/reposition every 2 hours/use positioning/transfer devices   Rotate device position/do not position patient on device   Ensure correct size (line/device) and apply per  instructions     Problem: Fall/Injury  Goal: Not fall by end of shift  Outcome: Progressing  Goal: Be free from injury by end of the shift  Outcome: Progressing  Goal: Verbalize understanding of personal risk factors for fall in the hospital  Outcome: Progressing  Goal: Verbalize understanding of risk factor reduction measures to prevent injury from fall in the home  Outcome: Progressing  Goal: Use assistive devices by end of the shift  Outcome: Progressing  Goal: Pace activities to  prevent fatigue by end of the shift  Outcome: Progressing     Problem: Pain - Adult  Goal: Verbalizes/displays adequate comfort level or baseline comfort level  Outcome: Progressing     Problem: Safety - Adult  Goal: Free from fall injury  Outcome: Progressing     Problem: Discharge Planning  Goal: Discharge to home or other facility with appropriate resources  Outcome: Progressing     Problem: Chronic Conditions and Co-morbidities  Goal: Patient's chronic conditions and co-morbidity symptoms are monitored and maintained or improved  Outcome: Progressing     Problem: Infection related to problem list condition  Goal: Infection will resolve through treatment  Outcome: Progressing

## 2024-11-03 NOTE — CARE PLAN
The patient's goals for the shift include      The clinical goals for the shift include remain safe with no fall/injury

## 2024-11-03 NOTE — PROGRESS NOTES
"Slim Saldivar is a 89 y.o. male on day 5 of admission presenting with FLORIN (acute kidney injury) (CMS-[facility]).    Subjective   Patient seen examined at bedside.  Patient easily woken. He is doing ok today. Awaiting PT/OT re-eval for discharge planning.        Objective     Physical Exam    Constitutional: Well developed, no distress, easily woken, alert and cooperative, thin   Skin: Warm and dry  Eyes: EOMI, makes eye contact   ENMT: mucous membranes slightly dry   Respiratory: Patent airways, CTAB  Cardiovascular: Regular, rate and rhythm  Abdominal: Nondistended, soft, non-tender, +BS  : watts in place with yellow urine   Neuro: awake and alert, oriented to self and birthday, follows simple commands like wiggling toes and fingers, answers social niceties     Last Recorded Vitals  Blood pressure 94/60, pulse 92, temperature 36.2 °C (97.2 °F), temperature source Temporal, resp. rate 16, height 1.702 m (5' 7\"), weight 63.9 kg (140 lb 14 oz), SpO2 93%.  Intake/Output last 3 Shifts:  I/O last 3 completed shifts:  In: 368.8 (5.8 mL/kg) [P.O.:120; I.V.:248.8 (3.9 mL/kg)]  Out: 600 (9.4 mL/kg) [Urine:600 (0.3 mL/kg/hr)]  Weight: 63.9 kg     Relevant Results  Scheduled medications  allopurinol, 100 mg, oral, Daily  cholecalciferol, 2,000 Units, oral, Daily  divalproex sprinkle, 250 mg, oral, BID  LORazepam, 0.25 mg, oral, BID  metoprolol succinate XL, 50 mg, oral, Daily  pantoprazole, 40 mg, oral, Daily before breakfast   Or  pantoprazole, 40 mg, intravenous, Daily before breakfast  [Held by provider] QUEtiapine, 25 mg, oral, Nightly  tamsulosin, 0.4 mg, oral, Daily  venlafaxine, 50 mg, oral, Daily  warfarin, 4 mg, oral, Daily      Continuous medications       PRN medications  PRN medications: acetaminophen **OR** acetaminophen **OR** acetaminophen, acetaminophen **OR** acetaminophen **OR** acetaminophen, dextrose, dextrose, glucagon, glucagon, magnesium hydroxide, melatonin, ondansetron ODT **OR** ondansetron  Results from " last 7 days   Lab Units 11/03/24  0704 11/02/24  0600 11/01/24  1117   WBC AUTO x10*3/uL 7.1 11.8* 8.7   RBC AUTO x10*6/uL 2.63* 2.91* 3.17*   HEMOGLOBIN g/dL 8.0* 8.9* 9.7*     Results from last 7 days   Lab Units 11/03/24  0705 11/02/24  0559 11/01/24  1117 10/30/24  0602 10/29/24  0556 10/28/24  2150   SODIUM mmol/L 139 138 143   < > 141 139   POTASSIUM mmol/L 3.9 4.3 3.5   < > 3.8 4.0   CHLORIDE mmol/L 104 103 106   < > 106 102   CO2 mmol/L 27 26 29   < > 25 26   BUN mg/dL 30* 26* 22   < > 31* 33*   CREATININE mg/dL 1.27 1.39* 1.36*   < > 2.59* 2.99*   CALCIUM mg/dL 8.2* 8.6 8.9   < > 8.3* 8.8   PHOSPHORUS mg/dL 3.1 2.6 2.8   < >  --   --    MAGNESIUM mg/dL 1.75 1.70 1.50*  --   --  1.75   BILIRUBIN TOTAL mg/dL  --   --   --   --  0.5 0.7   ALT U/L  --   --   --   --  9* 12   AST U/L  --   --   --   --  16 20    < > = values in this interval not displayed.       No results found.     Assessment/Plan   Principal Problem:    FLORIN (acute kidney injury) (CMS-[facility])  Active Problems:    Dementia with aggressive behavior (Multi)    Supratherapeutic INR    Fall    Acute cystitis    -watts in place, urine yellow  -urology consulted   -Ucx neg   -failed voiding trial.  Watts replaced, will arrange for outpatient urology appointment vs do another voiding trial   -Cr 1.39, at baseline of 1.3-1.4  -encourage PO intake   -A&O x1 at BL, appears to be at baseline mentation    -continue coumadin 4mg daily   -f/u AM INR   -continue home depakote, effexor and ativan   -will dc sitter  -PT/OT recommended moderate intensity, awaiting re-eval   -podiatry consulted for toe nail care     Dispo: Improving.  Needs PT/OT re-eval. Anticipate discharge in 24-48hrs.     Anju Salomon DO   Hospitalist      Addendum: incorrectly documented INR, edited to fix.

## 2024-11-03 NOTE — PROGRESS NOTES
Speech-Language Pathology    SLP Adult Inpatient Clinical Swallow Evaluation    Patient Name: Slim Saldivar  MRN: [id number]  Today's Date: 11/3/2024  Time Calculation  Start Time: 1932  Stop Time: 1956  Time Calculation (min): 24 min         Current Problem:   1. FLORIN (acute kidney injury) (CMS-HCC)        2. Fall, initial encounter        3. Supratherapeutic INR          Recommendations:  Diet: - Soft & Bite Sized (IDDSI Level 6)  - Thin liquids (IDDSI Level 0); NO STRAW  Strategies:   - Small bites  - Small, single sips  - Alternate consistencies  - Add moisture to dry foods  - Upright for all PO intake  - Medications crushed in puree (verify with MD)  - Limit distractions  - Complete oral care frequently throughout the day  - Full supervision with meals; One to one assist with meals  Instrumental Evaluation: Modified Barium Swallow Study (MBSS) to assess swallow physiology  Discontinue PO if S/S dysphagia are noted and contact ST.      Assessment:  Consistencies Trialed:   - Regular (IDDSI Level 7)  - Soft & Bite Sized (IDDSI Level 6)  - Pureed (IDDSI Level 4)   - Thin liquids (IDDSI Level 0)  Oral motor exam: Patient with adequate labial retraction/protrusion, although decreased overall strength. Lingual ROM was WFL, with slightly decreased strength. Patient with missing dentition upper and lower. Unable to assess buccal strength/ROM or volitional swallow as patient did not understand directives (vs could not execute same). Volitional cough was weak.  Oral phase of swallowing was mildly to moderately impaired, characterized by decreased bolus manipulation/mastication/A-P propulsion for regular consistency (whereas oral phase for soft and bite sized was slowed but WFL). Patient otherwise with functional oral preparation and clearance of all textures.   Pharyngeal dysphagia suspect, characterized by coughing with regular consistency, and use of multiple swallows per regular bolus (x3). Unable to assess patient  performance during the Mchenry Swallow Protocol  (i.e., 3 oz water challenge) as patient averaged only 1-2 consecutive swallows via straw. It should be noted patient presented with S/S pharyngeal dysphagia (coughing) given same. Patient with no apparent S/S pharyngeal dysphagia given single sips thin liquid only, or during other PO presentation.   Patient was oriented to first name only.   Patient is considered to be at risk for aspiration.   Per the results of this assessment, patient is appropriate for PO at this time. Please refer to recommendations and precautions as noted above.   ST to continue to follow patient during inpatient stay.      Plan:    Skilled speech therapy for dysphagia treatment is warranted for ongoing assessment of oropharyngeal swallow function, to ensure tolerance of safest and least restrictive consistencies, to provide training and instruction regarding the use of compensatory swallow strategies and pt/caregiver education in order to reduce risk of aspiration, dehydration and malnutrition.   Frequency: 2x/wk   Duration: 2 weeks    SLP Discharge Recommendations: To be determined pending progress in therapy during hospital course     Goals:  1. Patient will tolerate PO with no overt s/s of aspiration in 90% of observed therapeutic trials.  Progressing. Patient able to consume 3 boluses soft bite sized consistency and 2 oz thin liquids via cup with no apparent S/S pharyngeal dysphagia given minimal to moderate cues.   2. Patient/caregiver will implement safe swallowing strategies to reduce risk of aspiration in 90% of trials given caregiver assistance/cueing as needed.   Progressing. Given minimal to moderate verbal/visual cues re: above strategies, patient able to adequately assist with feeding (although still required 1:1 feeding assistance).  3. Complete a modified barium swallow study (MBSS) for objective assessment of swallowing function, to assess for aspiration, and to guide further  recommendations and treatment plan.  MBSS has been ordered. ?     Subjective:  Patient was seen at bedside for clinical swallow evaluation with sitter present. Patient was assisted into an upright position for PO trials. Vocal intensity was slightly decreased.      Baseline Assessment:   O2 use: Room air      General Visit Information:   Clinical Swallow Evaluation ordered due to concern for dysphagia. Current diet level is Regular with thin liquids, which is patient's baseline.   RN Julián reports patient is very confused. Patient resides in a memory unit. Patient either swallows or will pocket pills, then spits them back out. Patient endorses having swallowed the pill (even when pill is still in mouth).     History of Present Illness:   Per EMR, “Slim Saldivar is a 89 y.o. male presenting with chief complaint of fall while on Coumadin.  Fall was unwitnessed but patient was able to recount the events leading up to his hospital admission.  Of note, he is best described as alert and oriented x 1 with noted sundowning.  He reports that he was leaning over his bed when he became lightheaded after prolonged leaning causing him to fall backwards and landing on his backside. Initial evaluation at the time of admission revealed a significant FLORIN with a creatinine of 2.99.  Baseline creatinine is approximately 1.50.  Upon arrival to the medical floors, patient's bedside nurse noted abdominal distention and suprapubic distention with bladder scan revealing greater than 1200 cc of retained urine.  Straight cath x 1 produced over 1.5 L of urine and subsequent reduction in patient's abdominal and suprapubic symptoms.  Past Medical History:   Anemia     Chronic kidney disease     Varicella      Imaging:   XR CHEST 1 VIEW;  10/28/2024   INDICATION: Signs/Symptoms: Chest Pain.  FINDINGS: The cardiac silhouette is stable in size. Mild left basilar subsegmental atelectasis. No significant pleural effusion. No pneumothorax.     Mild  "left basilar subsegmental atelectasis.\"      Pain:  PAINAD (Pain Assessment in Advanced Dementia)    Breathin    Negative Vocalization: 0    Facial Expression: 0  Body Language: 0  Consolability: 0  PAINAD Score: 0       Treatment: Yes. Treatment provided following clinical swallow evaluation. Reviewed results and impressions in detail with patient/caregivers. Discussed recommended diet textures and strategies for balancing aspiration/choking risk with quality of life, as well as rationale behind MBSS and procedure. Further discussed risk for aspiration, and need for strict adherence to aspiration precautions.   Patient unable to indicate safe swallow strategies (compensatory swallow techniques) per Clinical Swallow Evaluation this date. Verbal models provided for same. Given minimal to moderate verbal/visual cues re: above strategies, patient consumed 3 boluses soft bite sized consistency, and 2 oz thin liquids via cup with no apparent S/S pharyngeal dysphagia.     Education:    Learner patient; RN Julián; sitter  Barriers to Learning Acuity of illness; cognitive communication limitations   Method demonstration; verbal; visual   Education-Topic SLP provided patient/family education regarding role of SLP, purpose of assessment and clinical impressions/recommendations. Patient verbalized questionable full comprehension, consistent with cognitive status. SLP further coordinated with RN regarding recommendations and precautions per this assessment, with RN verbalizing understanding.   Outcome Needs review and reinforcement    "

## 2024-11-04 ENCOUNTER — APPOINTMENT (OUTPATIENT)
Dept: RADIOLOGY | Facility: HOSPITAL | Age: 89
End: 2024-11-04
Payer: MEDICARE

## 2024-11-04 LAB
ANION GAP SERPL CALC-SCNC: 11 MMOL/L (ref 10–20)
BUN SERPL-MCNC: 29 MG/DL (ref 6–23)
CALCIUM SERPL-MCNC: 8.4 MG/DL (ref 8.6–10.3)
CHLORIDE SERPL-SCNC: 104 MMOL/L (ref 98–107)
CO2 SERPL-SCNC: 27 MMOL/L (ref 21–32)
CREAT SERPL-MCNC: 1.13 MG/DL (ref 0.5–1.3)
EGFRCR SERPLBLD CKD-EPI 2021: 62 ML/MIN/1.73M*2
ERYTHROCYTE [DISTWIDTH] IN BLOOD BY AUTOMATED COUNT: 14.6 % (ref 11.5–14.5)
GLUCOSE BLD MANUAL STRIP-MCNC: 109 MG/DL (ref 74–99)
GLUCOSE BLD MANUAL STRIP-MCNC: 89 MG/DL (ref 74–99)
GLUCOSE BLD MANUAL STRIP-MCNC: 99 MG/DL (ref 74–99)
GLUCOSE SERPL-MCNC: 87 MG/DL (ref 74–99)
HCT VFR BLD AUTO: 24.9 % (ref 41–52)
HGB BLD-MCNC: 8 G/DL (ref 13.5–17.5)
INR PPP: 1.3 (ref 0.9–1.1)
MAGNESIUM SERPL-MCNC: 1.74 MG/DL (ref 1.6–2.4)
MCH RBC QN AUTO: 30.3 PG (ref 26–34)
MCHC RBC AUTO-ENTMCNC: 32.1 G/DL (ref 32–36)
MCV RBC AUTO: 94 FL (ref 80–100)
NRBC BLD-RTO: 0 /100 WBCS (ref 0–0)
PHOSPHATE SERPL-MCNC: 2.9 MG/DL (ref 2.5–4.9)
PLATELET # BLD AUTO: 174 X10*3/UL (ref 150–450)
POTASSIUM SERPL-SCNC: 3.8 MMOL/L (ref 3.5–5.3)
PROTHROMBIN TIME: 14.4 SECONDS (ref 9.8–12.8)
RBC # BLD AUTO: 2.64 X10*6/UL (ref 4.5–5.9)
SODIUM SERPL-SCNC: 138 MMOL/L (ref 136–145)
WBC # BLD AUTO: 6 X10*3/UL (ref 4.4–11.3)

## 2024-11-04 PROCEDURE — 0HBRXZZ EXCISION OF TOE NAIL, EXTERNAL APPROACH: ICD-10-PCS | Performed by: PODIATRIST

## 2024-11-04 PROCEDURE — 83735 ASSAY OF MAGNESIUM: CPT | Performed by: STUDENT IN AN ORGANIZED HEALTH CARE EDUCATION/TRAINING PROGRAM

## 2024-11-04 PROCEDURE — 2500000005 HC RX 250 GENERAL PHARMACY W/O HCPCS: Performed by: INTERNAL MEDICINE

## 2024-11-04 PROCEDURE — 2500000002 HC RX 250 W HCPCS SELF ADMINISTERED DRUGS (ALT 637 FOR MEDICARE OP, ALT 636 FOR OP/ED): Performed by: INTERNAL MEDICINE

## 2024-11-04 PROCEDURE — 92611 MOTION FLUOROSCOPY/SWALLOW: CPT | Mod: GN | Performed by: SPEECH-LANGUAGE PATHOLOGIST

## 2024-11-04 PROCEDURE — 97530 THERAPEUTIC ACTIVITIES: CPT | Mod: GP,CQ

## 2024-11-04 PROCEDURE — 2500000005 HC RX 250 GENERAL PHARMACY W/O HCPCS: Performed by: STUDENT IN AN ORGANIZED HEALTH CARE EDUCATION/TRAINING PROGRAM

## 2024-11-04 PROCEDURE — 2500000001 HC RX 250 WO HCPCS SELF ADMINISTERED DRUGS (ALT 637 FOR MEDICARE OP): Performed by: INTERNAL MEDICINE

## 2024-11-04 PROCEDURE — 85027 COMPLETE CBC AUTOMATED: CPT | Performed by: STUDENT IN AN ORGANIZED HEALTH CARE EDUCATION/TRAINING PROGRAM

## 2024-11-04 PROCEDURE — 99233 SBSQ HOSP IP/OBS HIGH 50: CPT | Performed by: STUDENT IN AN ORGANIZED HEALTH CARE EDUCATION/TRAINING PROGRAM

## 2024-11-04 PROCEDURE — 84100 ASSAY OF PHOSPHORUS: CPT | Performed by: STUDENT IN AN ORGANIZED HEALTH CARE EDUCATION/TRAINING PROGRAM

## 2024-11-04 PROCEDURE — 36415 COLL VENOUS BLD VENIPUNCTURE: CPT | Performed by: STUDENT IN AN ORGANIZED HEALTH CARE EDUCATION/TRAINING PROGRAM

## 2024-11-04 PROCEDURE — 82947 ASSAY GLUCOSE BLOOD QUANT: CPT

## 2024-11-04 PROCEDURE — 1100000001 HC PRIVATE ROOM DAILY

## 2024-11-04 PROCEDURE — 11721 DEBRIDE NAIL 6 OR MORE: CPT | Performed by: PODIATRIST

## 2024-11-04 PROCEDURE — 74230 X-RAY XM SWLNG FUNCJ C+: CPT

## 2024-11-04 PROCEDURE — 2500000002 HC RX 250 W HCPCS SELF ADMINISTERED DRUGS (ALT 637 FOR MEDICARE OP, ALT 636 FOR OP/ED): Performed by: STUDENT IN AN ORGANIZED HEALTH CARE EDUCATION/TRAINING PROGRAM

## 2024-11-04 PROCEDURE — 85610 PROTHROMBIN TIME: CPT | Performed by: STUDENT IN AN ORGANIZED HEALTH CARE EDUCATION/TRAINING PROGRAM

## 2024-11-04 PROCEDURE — 74230 X-RAY XM SWLNG FUNCJ C+: CPT | Performed by: RADIOLOGY

## 2024-11-04 PROCEDURE — 2500000001 HC RX 250 WO HCPCS SELF ADMINISTERED DRUGS (ALT 637 FOR MEDICARE OP): Performed by: STUDENT IN AN ORGANIZED HEALTH CARE EDUCATION/TRAINING PROGRAM

## 2024-11-04 PROCEDURE — 80048 BASIC METABOLIC PNL TOTAL CA: CPT | Performed by: STUDENT IN AN ORGANIZED HEALTH CARE EDUCATION/TRAINING PROGRAM

## 2024-11-04 RX ORDER — WARFARIN 3 MG/1
6 TABLET ORAL DAILY
Status: DISCONTINUED | OUTPATIENT
Start: 2024-11-04 | End: 2024-11-04

## 2024-11-04 RX ORDER — LORAZEPAM 2 MG/ML
0.5 INJECTION INTRAMUSCULAR ONCE
Status: COMPLETED | OUTPATIENT
Start: 2024-11-05 | End: 2024-11-05

## 2024-11-04 ASSESSMENT — COGNITIVE AND FUNCTIONAL STATUS - GENERAL
MOVING FROM LYING ON BACK TO SITTING ON SIDE OF FLAT BED WITH BEDRAILS: A LOT
CLIMB 3 TO 5 STEPS WITH RAILING: TOTAL
MOVING TO AND FROM BED TO CHAIR: A LOT
TURNING FROM BACK TO SIDE WHILE IN FLAT BAD: A LOT
STANDING UP FROM CHAIR USING ARMS: A LOT
MOBILITY SCORE: 11
WALKING IN HOSPITAL ROOM: A LOT

## 2024-11-04 ASSESSMENT — PAIN - FUNCTIONAL ASSESSMENT
PAIN_FUNCTIONAL_ASSESSMENT: 0-10
PAIN_FUNCTIONAL_ASSESSMENT: 0-10

## 2024-11-04 ASSESSMENT — PAIN SCALES - GENERAL
PAINLEVEL_OUTOF10: 0 - NO PAIN

## 2024-11-04 ASSESSMENT — ACTIVITIES OF DAILY LIVING (ADL): EFFECT OF PAIN ON DAILY ACTIVITIES: .

## 2024-11-04 NOTE — PROGRESS NOTES
Physical Therapy    Physical Therapy    Physical Therapy Treatment    Patient Name: Slim Saldivar  MRN: [id number]  Today's Date: 11/4/2024  Time Calculation  Start Time: 1350  Stop Time: 1420  Time Calculation (min): 30 min     3116/3116-A     11/04/24 1350   PT  Visit   PT Received On 11/04/24   Response to Previous Treatment Patient with no complaints from previous session.   General   Reason for Referral impaired mobility, gait training   Referred By Anju Salomon   Prior to Session Communication Bedside nurse   Patient Position Received Bed, 3 rail up;Alarm on  (sitter in room)   General Comment Pt agreeable to therapy and cleared for participation   Precautions   Medical Precautions Fall precautions   Pain Assessment   Pain Assessment 0-10   0-10 (Numeric) Pain Score 0 - No pain   Effect of Pain on Daily Activities .   Cognition   Overall Cognitive Status Impaired at baseline   Orientation Level Disoriented to place;Disoriented to time;Disoriented to situation   Therapeutic Exercise   Therapeutic Exercise Performed Yes   Therapeutic Exercise Activity 1 AP, marching x20, LAQ x10 mild L lean with cues to facilitate upright sitting posture, able to correct with v/t cues and [place]   Bed Mobility   Bed Mobility Yes   Bed Mobility 1   Bed Mobility 1 Supine to sitting   Level of Assistance 1 Moderate assistance;Moderate verbal cues;Moderate tactile cues   Bed Mobility Comments 1 Increased time and effort to complete. HOB slightly elevated   Transfers   Transfer Yes   Transfer 1   Transfer From 1 Bed to   Transfer to 1 Stand;Sit   Technique 1 Sit to stand;Stand to sit   Transfer Device 1 Walker;Gait belt   Transfer Level of Assistance 1 Moderate assistance;Moderate verbal cues;Moderate tactile cues   Trials/Comments 1 x2 STS from bed level. Mod cues for safe UE placement and sequencing. Pt unable to understand and pulls up on WW despite v/t cues.   Transfers 2   Transfer From 2 Bed to   Transfer to 2 Chair with arms    Technique 2 Stand pivot   Transfer Device 2 Walker;Gait belt   Transfer Level of Assistance 2 Moderate assistance;Moderate verbal cues;Moderate tactile cues   Trials/Comments 2 Increased effort to complete with cues for turning steps, sequencing and safety   Activity Tolerance   Endurance Tolerates 10 - 20 min exercise with multiple rests   PT Assessment   PT Assessment Results Decreased strength;Decreased endurance;Impaired balance;Decreased mobility   Rehab Prognosis Good   End of Session Communication Bedside nurse;PCT/NA/CTA   Assessment Comment Pt put forth fair effort. Requires modA and mod v/t cues for sequencing and safety. Mild Left lateral lean during sitting balance on bed and chair. Able to correct with tactile cues and [place]   End of Session Patient Position Up in chair;Alarm on     Outcome Measures:  [facility] Basic Mobility  Turning from your back to your side while in a flat bed without using bedrails: A lot  Moving from lying on your back to sitting on the side of a flat bed without using bedrails: A lot  Moving to and from bed to chair (including a wheelchair): A lot  Standing up from a chair using your arms (e.g. wheelchair or bedside chair): A lot  To walk in hospital room: A lot  Climbing 3-5 steps with railing: Total  Basic Mobility - Total Score: 11                             EDUCATION:  Outpatient Education  Individual(s) Educated: Patient  Education Provided: Fall Risk  Education Documentation  No documentation found.  Education Comments  No comments found.        GOALS:  Encounter Problems       Encounter Problems (Active)       PT Problem       Pt will be able to perform all bed mobility tasks with Mod A.  (Progressing)       Start:  11/01/24    Expected End:  11/15/24            Pt will perform all transfers with Mod A and LRAD with proper safety mechanics.   (Progressing)       Start:  11/01/24    Expected End:  11/15/24            Pt will ambulate 30 ft with Mod A using LRAD for improved  functional independence.  (Progressing)       Start:  11/01/24    Expected End:  11/15/24               Pain - Adult

## 2024-11-04 NOTE — CARE PLAN
The patient's goals for the shift include      The clinical goals for the shift include saftey    Problem: Skin  Goal: Participates in plan/prevention/treatment measures  11/3/2024 2213 by Julián Mora RN  Outcome: Progressing  11/3/2024 2212 by Julián Mora RN  Outcome: Progressing     Problem: Fall/Injury  Goal: Be free from injury by end of the shift  11/3/2024 2213 by Julián Mora RN  Outcome: Progressing  11/3/2024 2212 by Julián Mora RN  Outcome: Progressing

## 2024-11-04 NOTE — PROGRESS NOTES
11/04/24 1408   Discharge Planning   Expected Discharge Disposition Home     [facility] [place]4/24   1557 PT recommends SNF. Called [facility] .  They use [facility] .  Called son Herbert . Ye she wants therapy to continue at facility.   Gonzalez snot think he would do well in a SNF.  Referral to [facility].  [facility]ed [facility]. Please call  and fax then discharge summary and therapy order to

## 2024-11-04 NOTE — PROGRESS NOTES
"Slim Saldivar is a 89 y.o. male on day 6 of admission presenting with FLORIN (acute kidney injury) (CMS-[facility]).    Subjective   Patient seen examined at bedside x2. Pt awake and resting comfortably. He answers social niceties and has no complaints at this time. Nursing noted he had slight penile swelling and I returned to bedside to evaluate. Pt denies pain.        Objective     Physical Exam    Constitutional: Well developed, no distress, easily woken, alert and cooperative, thin   Skin: Warm and dry  Eyes: EOMI, makes eye contact   ENMT: mucous membranes slightly dry   Respiratory: Patent airways, CTAB  Cardiovascular: Regular, rate and rhythm  Abdominal: Nondistended, soft, non-tender, +BS  : watts in place with yellow urine, mild penile swelling    Neuro: awake and alert x1, pleasantly confused, follows simple commands, answers social niceties     Last Recorded Vitals  Blood pressure 100/58, pulse 78, temperature 36.1 °C (97 °F), temperature source Temporal, resp. rate 17, height 1.702 m (5' 7\"), weight 63.9 kg (140 lb 14 oz), SpO2 98%.  Intake/Output last 3 Shifts:  I/O last 3 completed shifts:  In: - (0 mL/kg)   Out: 700 (11 mL/kg) [Urine:700 (0.3 mL/kg/hr)]  Weight: 63.9 kg     Relevant Results  Scheduled medications  allopurinol, 100 mg, oral, Daily  cholecalciferol, 2,000 Units, oral, Daily  divalproex sprinkle, 250 mg, oral, BID  LORazepam, 0.25 mg, oral, BID  metoprolol succinate XL, 50 mg, oral, Daily  pantoprazole, 40 mg, oral, Daily before breakfast   Or  pantoprazole, 40 mg, intravenous, Daily before breakfast  [Held by provider] QUEtiapine, 25 mg, oral, Nightly  tamsulosin, 0.4 mg, oral, Daily  venlafaxine, 50 mg, oral, Daily  warfarin, 6 mg, oral, Daily      Continuous medications       PRN medications  PRN medications: acetaminophen **OR** acetaminophen **OR** acetaminophen, acetaminophen **OR** acetaminophen **OR** acetaminophen, dextrose, dextrose, glucagon, glucagon, magnesium hydroxide, " melatonin, ondansetron ODT **OR** ondansetron  Results from last 7 days   Lab Units 11/04/24  0617 11/03/24  0704 11/02/24  0600   WBC AUTO x10*3/uL 6.0 7.1 11.8*   RBC AUTO x10*6/uL 2.64* 2.63* 2.91*   HEMOGLOBIN g/dL 8.0* 8.0* 8.9*     Results from last 7 days   Lab Units 11/04/24  0616 11/03/24  0705 11/02/24  0559 10/30/24  0602 10/29/24  0556 10/28/24  2150   SODIUM mmol/L 138 139 138   < > 141 139   POTASSIUM mmol/L 3.8 3.9 4.3   < > 3.8 4.0   CHLORIDE mmol/L 104 104 103   < > 106 102   CO2 mmol/L 27 27 26   < > 25 26   BUN mg/dL 29* 30* 26*   < > 31* 33*   CREATININE mg/dL 1.13 1.27 1.39*   < > 2.59* 2.99*   CALCIUM mg/dL 8.4* 8.2* 8.6   < > 8.3* 8.8   PHOSPHORUS mg/dL 2.9 3.1 2.6   < >  --   --    MAGNESIUM mg/dL 1.74 1.75 1.70   < >  --  1.75   BILIRUBIN TOTAL mg/dL  --   --   --   --  0.5 0.7   ALT U/L  --   --   --   --  9* 12   AST U/L  --   --   --   --  16 20    < > = values in this interval not displayed.       No results found.     Assessment/Plan   Principal Problem:    FLORIN (acute kidney injury) (CMS-[facility])  Active Problems:    Dementia with aggressive behavior (Multi)    Supratherapeutic INR    Fall    Acute cystitis    -watts in place, urine yellow  -urology consulted   -Ucx neg   -voiding trial again today   -Cr 1.13, at baseline of 1.3-1.4  -encourage PO intake   -A&O x1 at BL, appears to be at baseline mentation    -INR 1.3  -increase coumadin 7.5mg daily   -f/u AM INR   -continue home depakote, effexor and ativan   -PT/OT recommended moderate intensity, [facility] 11  -podiatry consulted for toe nail care     Dispo: Improving.  Repeat voiding trial today. Discharge planning.  Anticipate discharge in 24-48hrs.     Anju Salomon DO   Hospitalist      Addendum: incorrectly documented INR, edited to fix.

## 2024-11-04 NOTE — PROCEDURES
Speech-Language Pathology    Inpatient Modified Barium Swallow Study    Patient Name: Slim Saldivar  MRN: [id number]  : 1935  Today's Date: 24  Time Calculation  Start Time: 1000  Stop Time: 1015  Time Calculation (min): 15 min    Modified Barium Swallow Study completed. Informed verbal consent obtained prior to completion of exam. The study was completed per modified protocol with various liquid barium consistencies, pudding, solids and a 13mm barium tablet.  A 1.9 cm or .75 inch (outer diameter) ring did not adhere due to presence of facial hair. AP view was unable to be completed due to time/radiation constraints. The anatomic structures and function of the oropharynx, larynx, hypopharynx and cervical esophagus were evaluated.    SLP: Susie Greene CCC-SLP   Contact info: [employer]; phone: [phone]      Reason for Referral: Suspected oral or pharyngeal dysphagia  Patient Hx:  89 y.o. year old male who presented to the ED on 10/28/24 for fall on Coumadin.  Initially the facility-concerned that he may have hit his head, refer on for interview revealed that he was leaning over his bed when he slumped backwards and landed without head injury.  He is oriented x 1 at baseline.  PMHx includes dementia, hypertension, A-fib on Coumadin, CKD 3. MBSS was recommended per results of clinical swallow evaluation.  Respiratory Status: Room air  Current diet: Soft and bite-sized solids and thin liquids per clinical swallow evaluation 11/3/24    Pain:  Pain Scale: 0-10  Ratin    FINAL SPEECH RECOMMENDATIONS    DIET:   DIET RECOMMENDATIONS: - Soft & Bite Sized (IDDSI Level 6)  - Thin liquids (IDDSI Level 0)  Per the results of today's MBSS, patient to continue baseline diet of regular consistencies and thin liquids following swallow strategies listed below:    STRATEGIES:  - Small bites  - Small, single sips  - Upright for all PO intake  - Swallow 2-3 times per bite/sip  - No straws  - Medications  crushed in puree (verify with MD)  - Complete oral care frequently throughout the day  - Full supervision with meals; One to one assist with meals    EXERCISES: Effortful swallow (as cognitive status permits)    SLP PLAN:  Skilled SLP Services: Skilled SLP intervention for dysphagia is warranted.  SLP Frequency: 2x per week  Duration: 2 weeks  Treatment/Interventions:   - Oropharyngeal exercises (as able)  - Compensatory strategy training  - Diet tolerance/advancement  - Patient/caregiver education    Discussed POC: Patient  Discussed Risks/Benefits: Yes  Patient/Caregiver Agreeable: Yes    Short term goals established 11/04/24:   1. Patient will tolerate recommended PO diet absent of overt s/s of aspiration in 90% of observed therapeutic trials.  2. Patient will implement safe swallowing strategies to reduce risk of aspiration in 90% of trials given caregiver assistance/cueing as needed.  3. Patient will complete recommended swallowing exercise (effortful swallow) for at least 30 repetitions during treatment session given minimal-moderate cues.      Education Provided: Patient educated on results of MBS study, recommended diet, and recommended safe swallow strategies. Patient verbalized questionable understanding of education provided, consistent with cognitive status. Education will be reinforced.    Additional Medical Consults Suggested: None currently    Repeat Study: Per MD or treating SLP    Mechanics of the Swallow Summary:  ORAL PHASE:  Lip Closure - Interlabial escape/no progression to anterior lip   Tongue Control During Bolus Hold - Cohesive bolus between tongue to palatal seal   Bolus prep/mastication - Disorganized mastication with solid pieces of bolus unchewed   Bolus transport/lingual motion - Repetitive/disorganized tongue motion (tongue pumping)   Oral residue - Residue collection on oral structure     PHARYNGEAL PHASE:  Initiation of pharyngeal swallow - Bolus head at pit of pyriforms   Soft  palate elevation - Trace column of contrast or air between soft palate and pharyngeal wall   Laryngeal elevation - Complete superior movement of thyroid cartilage with contact of arytenoids to epiglottic petiole   Anterior hyoid excursion - Partial anterior movement   Epiglottic movement - Partial inversion  Laryngeal vestibule closure - Incomplete - narrow column of air/contrast in laryngeal vestibule   Pharyngeal stripping wave - Present, however, diminished   Pharyngeal contraction (A/P view) - Not tested       Pharyngoesophageal segment opening - Partial distension/partial duration with partial obstruction of flow of bolus   Tongue base retraction - Wide column of contrast or air between tongue base and pharyngeal wall   Pharyngeal residue - Collection of residue within or on the pharyngeal structures     ESOPHAGEAL PHASE:  Esophageal clearance - Not evaluated       SLP Impressions with Severity Rating:   Pt presents with moderate oropharyngeal dysphagia upon completion of modified barium swallow study this date. Swallowing physiology is detailed above. Impairments most impacting swallowing safety and efficiency include decreased lingual propulsion, impaired mastication, reduced tongue base retraction, reduced hyolaryngeal excursion, partial epiglottic inversion, incomplete laryngeal vestibule closure, diminished pharyngeal stripping wave, and decreased PES opening. Patient demonstrated deep penetration of all consistencies except solids (although around 50% of solid bolus was retained in vallecula). Cued throat clear was somewhat effective in ejecting contrast. Trace SILENT aspiration was visualized for single and consecutive straw sips of thin liquids. Patient demonstrated mild-moderate oral and pharyngeal residue that resulted in ongoing penetration. Cued throat clear was somewhat effective in ejecting contrast. Cued repeat swallows were somewhat effective in reducing residue.    Barium tablet was given in  the lateral view with pudding, but remained in patient's oral cavity despite mildly thick liquid wash. Patient was unable to expectorate tablet when cued, and it was removed from his mouth. A-P view was unable to be completed due to time/radiation constraints.    Per the results of this assessment, and given that patient is stable on room air and without concern for aspiration-related complications at this time, recommend continuing soft and bite-sized solids and thin liquids, with STRICT aspiration precautions and oral care. ST to continue to follow during inpatient stay to ensure diet tolerance, to provide ongoing education/training in safe swallowing strategies to reduce risk of aspiration, and to direct swallowing exercises targeting deficits identified.      OUTCOME MEASURES:  Functional Oral Intake Scale  Functional Oral Intake Scale: Level 5        total oral diet with multiple consistencies, but requires special preparations and compensations       Rosenbek's Penetration Aspiration Scale  Thin Liquids: 5. DEEP PENETRATION with HIGH ASPIRATION risk - contrast contacts vocal cords, visible residue  8. SILENT ASPIRATION - contrast passes glottis, visible residue, NO pt response]   During the Swallow  After the Swallow  Nectar Thick Liquids: 5. DEEP PENETRATION with HIGH ASPIRATION risk - contrast contacts vocal cords, visible residue  During the Swallow  After the Swallow  Honey Thick Liquids: 5. DEEP PENETRATION with HIGH ASPIRATION risk - contrast contacts vocal cords, visible residue  During the Swallow  Puree: 5. DEEP PENETRATION with HIGH ASPIRATION risk - contrast contacts vocal cords, visible residue  During the Swallow  After the Swallow  Solids: 1. NO ASPIRATION & NO PENETRATION - no aspiration, contrast does not enter airway

## 2024-11-04 NOTE — CARE PLAN
The patient's goals for the shift include      The clinical goals for the shift include saftey    Problem: Fall/Injury  Goal: Not fall by end of shift  Outcome: Progressing

## 2024-11-04 NOTE — CONSULTS
Consults    Reason For Consult  Painfully elongated nails    History Of Present Illness  Slim Salidvar is a 89 y.o. male presenting with gross neglect of toenails.     Past Medical History  He has a past medical history of Anemia, Chronic kidney disease, and Varicella.    Surgical History  He has a past surgical history that includes Other surgical history (01/20/2022); Circumcision, primary; Tonsillectomy; Vasectomy; and [place] tooth extraction.     Social History  He reports that he has quit smoking. His smoking use included cigarettes. He has been exposed to tobacco smoke. He has never used smokeless tobacco. He reports that he does not currently use alcohol. He reports that he does not use drugs.    Family History  Family History   Problem Relation Name Age of Onset    Hypertension Mother Vida Saldivar     Stroke Mother Vida Saldivar     Heart disease Father Bird Saldivar     Cancer Sister Ashley Brownlee         Allergies  Patient has no known allergies.    Review of Systems    REVIEW OF SYSTEMS  GENERAL:  Negative for malaise, significant weight loss, fever  HEENT:  No changes in hearing or vision, no nose bleeds or other nasal problems and Negative for frequent or significant headaches  NECK:  Negative for lumps, goiter, pain and significant neck swelling  RESPIRATORY:  Negative for cough, wheezing and shortness of breath  CARDIOVASCULAR:  Negative for chest pain, leg swelling and palpitations  GI:  Negative for abdominal discomfort, blood in stools or black stools and change in bowel habits  :  Negative for dysuria, frequency and incontinence  MUSCULOSKELETAL:  Negative for joint pain or swelling, back pain, and muscle pain.  SKIN:  Negative for lesions, rash, and itching  PSYCH:  Negative for sleep disturbance, mood disorder and recent psychosocial stressors  HEMATOLOGY/LYMPHOLOGY:  Negative for prolonged bleeding, bruising easily, and swollen nodes.  ENDOCRINE:  Negative for cold or heat intolerance, polyuria,  "polydipsia and goiter  NEURO: Negative, denies any burning, tingling or numbness     Objective:   Vasc: DP and PT pulses are palpable bilateral.  CFT is less than 3 seconds bilateral.  Skin temperature is warm to cool proximal to distal bilateral.      Neuro:  Light touch is intact to the foot bilateral.       Derm: Nails 1-5 bilateral are thickened elongated and crumbly with subungual debris skin is supple with normal texture and turgor noted.  Webspaces are clean, dry and intact bilateral.  There are no hyperkeratoses, ulcerations, verruca or other lesions noted.      Ortho: Muscle strength is 5/5 for all pedal groups tested.  Ankle joint, subtalar joint, 1st MPJ and lesser MPJ ROM is full and without pain or crepitus.  The foot type is rectus bilateral off weight bearing.  There are no structural deformities noted.       Physical Exam     Last Recorded Vitals  Blood pressure 100/58, pulse 78, temperature 36.1 °C (97 °F), temperature source Temporal, resp. rate 17, height 1.702 m (5' 7\"), weight 63.9 kg (140 lb 14 oz), SpO2 98%.    Relevant Results       Assessment/Plan     Painful nail mycosis  Toenails are debrided in length and thickness to avoid infection and for pain relief      I spent 20 minutes in the professional and overall care of this patient.          "

## 2024-11-04 NOTE — CARE PLAN
Problem: Safety - Adult  Goal: Free from fall injury  11/3/2024 2213 by Julián Mora RN  Outcome: Progressing  11/3/2024 2212 by Julián Mora, RN  Outcome: Progressing   The patient's goals for the shift include      The clinical goals for the shift include saftey

## 2024-11-05 LAB
ANION GAP SERPL CALC-SCNC: 11 MMOL/L (ref 10–20)
BUN SERPL-MCNC: 26 MG/DL (ref 6–23)
CALCIUM SERPL-MCNC: 8.6 MG/DL (ref 8.6–10.3)
CHLORIDE SERPL-SCNC: 105 MMOL/L (ref 98–107)
CO2 SERPL-SCNC: 27 MMOL/L (ref 21–32)
CREAT SERPL-MCNC: 1.09 MG/DL (ref 0.5–1.3)
EGFRCR SERPLBLD CKD-EPI 2021: 65 ML/MIN/1.73M*2
ERYTHROCYTE [DISTWIDTH] IN BLOOD BY AUTOMATED COUNT: 14.6 % (ref 11.5–14.5)
GLUCOSE BLD MANUAL STRIP-MCNC: 105 MG/DL (ref 74–99)
GLUCOSE BLD MANUAL STRIP-MCNC: 77 MG/DL (ref 74–99)
GLUCOSE BLD MANUAL STRIP-MCNC: 92 MG/DL (ref 74–99)
GLUCOSE BLD MANUAL STRIP-MCNC: 95 MG/DL (ref 74–99)
GLUCOSE BLD MANUAL STRIP-MCNC: 98 MG/DL (ref 74–99)
GLUCOSE SERPL-MCNC: 86 MG/DL (ref 74–99)
HCT VFR BLD AUTO: 25.9 % (ref 41–52)
HGB BLD-MCNC: 8.3 G/DL (ref 13.5–17.5)
INR PPP: 1.3 (ref 0.9–1.1)
MAGNESIUM SERPL-MCNC: 1.71 MG/DL (ref 1.6–2.4)
MCH RBC QN AUTO: 30.3 PG (ref 26–34)
MCHC RBC AUTO-ENTMCNC: 32 G/DL (ref 32–36)
MCV RBC AUTO: 95 FL (ref 80–100)
NRBC BLD-RTO: 0 /100 WBCS (ref 0–0)
PHOSPHATE SERPL-MCNC: 2.9 MG/DL (ref 2.5–4.9)
PLATELET # BLD AUTO: 205 X10*3/UL (ref 150–450)
POTASSIUM SERPL-SCNC: 3.8 MMOL/L (ref 3.5–5.3)
PROTHROMBIN TIME: 14.6 SECONDS (ref 9.8–12.8)
RBC # BLD AUTO: 2.74 X10*6/UL (ref 4.5–5.9)
SODIUM SERPL-SCNC: 139 MMOL/L (ref 136–145)
WBC # BLD AUTO: 4.5 X10*3/UL (ref 4.4–11.3)

## 2024-11-05 PROCEDURE — 51702 INSERT TEMP BLADDER CATH: CPT

## 2024-11-05 PROCEDURE — 2500000002 HC RX 250 W HCPCS SELF ADMINISTERED DRUGS (ALT 637 FOR MEDICARE OP, ALT 636 FOR OP/ED): Performed by: INTERNAL MEDICINE

## 2024-11-05 PROCEDURE — 83735 ASSAY OF MAGNESIUM: CPT | Performed by: STUDENT IN AN ORGANIZED HEALTH CARE EDUCATION/TRAINING PROGRAM

## 2024-11-05 PROCEDURE — 80048 BASIC METABOLIC PNL TOTAL CA: CPT | Performed by: STUDENT IN AN ORGANIZED HEALTH CARE EDUCATION/TRAINING PROGRAM

## 2024-11-05 PROCEDURE — 2500000001 HC RX 250 WO HCPCS SELF ADMINISTERED DRUGS (ALT 637 FOR MEDICARE OP): Performed by: INTERNAL MEDICINE

## 2024-11-05 PROCEDURE — 82947 ASSAY GLUCOSE BLOOD QUANT: CPT

## 2024-11-05 PROCEDURE — 85027 COMPLETE CBC AUTOMATED: CPT | Performed by: STUDENT IN AN ORGANIZED HEALTH CARE EDUCATION/TRAINING PROGRAM

## 2024-11-05 PROCEDURE — 2500000004 HC RX 250 GENERAL PHARMACY W/ HCPCS (ALT 636 FOR OP/ED)

## 2024-11-05 PROCEDURE — 99233 SBSQ HOSP IP/OBS HIGH 50: CPT | Performed by: STUDENT IN AN ORGANIZED HEALTH CARE EDUCATION/TRAINING PROGRAM

## 2024-11-05 PROCEDURE — 36415 COLL VENOUS BLD VENIPUNCTURE: CPT | Performed by: STUDENT IN AN ORGANIZED HEALTH CARE EDUCATION/TRAINING PROGRAM

## 2024-11-05 PROCEDURE — 85610 PROTHROMBIN TIME: CPT | Performed by: STUDENT IN AN ORGANIZED HEALTH CARE EDUCATION/TRAINING PROGRAM

## 2024-11-05 PROCEDURE — 1100000001 HC PRIVATE ROOM DAILY

## 2024-11-05 PROCEDURE — 84100 ASSAY OF PHOSPHORUS: CPT | Performed by: STUDENT IN AN ORGANIZED HEALTH CARE EDUCATION/TRAINING PROGRAM

## 2024-11-05 PROCEDURE — 2500000002 HC RX 250 W HCPCS SELF ADMINISTERED DRUGS (ALT 637 FOR MEDICARE OP, ALT 636 FOR OP/ED): Performed by: STUDENT IN AN ORGANIZED HEALTH CARE EDUCATION/TRAINING PROGRAM

## 2024-11-05 PROCEDURE — 2500000001 HC RX 250 WO HCPCS SELF ADMINISTERED DRUGS (ALT 637 FOR MEDICARE OP): Performed by: STUDENT IN AN ORGANIZED HEALTH CARE EDUCATION/TRAINING PROGRAM

## 2024-11-05 RX ORDER — LORAZEPAM 0.5 MG/1
0.25 TABLET ORAL ONCE
Status: COMPLETED | OUTPATIENT
Start: 2024-11-05 | End: 2024-11-05

## 2024-11-05 RX ORDER — WARFARIN 3 MG/1
TABLET ORAL
Start: 2024-11-05 | End: 2024-11-06

## 2024-11-05 RX ORDER — TAMSULOSIN HYDROCHLORIDE 0.4 MG/1
0.4 CAPSULE ORAL DAILY
Start: 2024-11-06 | End: 2024-12-06

## 2024-11-05 ASSESSMENT — PAIN SCALES - GENERAL
PAINLEVEL_OUTOF10: 0 - NO PAIN
PAINLEVEL_OUTOF10: 0 - NO PAIN

## 2024-11-05 NOTE — PROGRESS NOTES
"Nutrition Follow Up Assessment:   Nutrition Assessment    Reason for Assessment:  (Continue to follow.)    Patient is a 89 y.o. male presenting with chief complaint of fall while on Coumadin.     11/05 Follow Up. S/p MBSS yesterday with recommendations to continue downgrade from level 7 (regular) to level 6 (soft and bite sized) and continue on thin liquids. Pt with sitter in room yesterday and now at nurse station this am. He was resting all day yesterday and not really interacting with staff, but today he ate 25% of breakfast that was fed to him and is more active in chair trying to stand up and ambulate.     Past Medical History:   Diagnosis Date    Anemia     Chronic kidney disease     Varicella      Past Surgical History:   Procedure Laterality Date    CIRCUMCISION, PRIMARY      OTHER SURGICAL HISTORY  01/20/2022    Tonsillectomy    TONSILLECTOMY      VASECTOMY      WISDOM TOOTH EXTRACTION           Nutrition History:  Food and Nutrient History: (10/29) Pt familiar to me from 3 months ago when he was hospitalized. He was eating poorly at that time. Today, he is not interested in eating either. Nursing have been working on him all morning to establish IV access. Pt is tired and barely audible when he speaks, but makes eye contact and accepts fluids. Tried to call his place of residence ([facility] [phone]) but no answer and tried to charli his spouse, but number is changed/disconnected. Pt was on a regular diet prior to admit. His weight shows he has maintained it over the past 3 months.  Vitamin/Herbal Supplement Use: Home meds include Vitamin D3  Food Allergies/Intolerances:  None  GI Symptoms:  LBM 10/31 per nursing documentation  Oral Problems: Chewing difficulty, Swallowing difficulty, and due to alertness - pt on regular diet prior to admit       Anthropometrics:  Height: 170.2 cm (5' 7\")   Weight: 63.9 kg (140 lb 14 oz)   BMI (Calculated): 22.06  IBW/kg (Dietitian Calculated): 67.13 kg  Percent of IBW: " 94.59 %       Weight History:   Wt Readings from Last 10 Encounters:   11/01/24 63.9 kg (140 lb 14 oz)   07/06/24 60.8 kg (134 lb)   03/12/24 65.8 kg (145 lb)   02/26/24 67.6 kg (149 lb)   09/12/23 67.6 kg (149 lb)   08/21/23 65.3 kg (144 lb)   08/04/23 70.3 kg (155 lb)   03/14/23 70.5 kg (155 lb 8 oz)   09/20/22 70.3 kg (155 lb)   08/18/22 69.9 kg (154 lb)       Weight Change %:  Weight History / % Weight Change: Weight stable at 140 lbs this admit.  Significant Weight Loss: No    Nutrition Focused Physical Exam Findings:    Subcutaneous Fat Loss:   Orbital Fat Pads: Mild-Moderate (slight dark circles and slight hollowing)  Buccal Fat Pads: Mild-Moderate (flat cheeks, minimal bounce)  Triceps: Severe (negligible fat tissue)  Ribs: Mild-Moderate (ribs apparent, depressions between them less pronounced, iliac crest somewhat prominent)  Muscle Wasting:  Temporalis: Mild-Moderate (slight depression)  Pectoralis (Clavicular Region): Mild-Moderate (some protrusion of clavicle)  Deltoid/Trapezius: Mild-Moderate (slight protrusion of acromion process)  Interosseous: Mild-Moderate (slightly depressed area between thumb and forefinger)  Trapezius/Infraspinatus/Supraspinatus (Scapular Region): Defer  Quadriceps: Severe (depressions on inner and outer thigh)  Gastrocnemius: Severe (minimal muscle definition)  Edema:  Edema: none  Physical Findings:  Skin: Negative    Nutrition Significant Labs:  CBC Trend:   Results from last 7 days   Lab Units 11/05/24  0603 11/04/24  0617 11/03/24  0704 11/02/24  0600   WBC AUTO x10*3/uL 4.5 6.0 7.1 11.8*   RBC AUTO x10*6/uL 2.74* 2.64* 2.63* 2.91*   HEMOGLOBIN g/dL 8.3* 8.0* 8.0* 8.9*   HEMATOCRIT % 25.9* 24.9* 24.8* 27.9*   MCV fL 95 94 94 96   PLATELETS AUTO x10*3/uL 205 174 157 183    , BMP Trend:   Results from last 7 days   Lab Units 11/05/24  0603 11/04/24  0616 11/03/24  0705 11/02/24  0559   GLUCOSE mg/dL 86 87 99 99   CALCIUM mg/dL 8.6 8.4* 8.2* 8.6   SODIUM mmol/L 139 138 139 138    POTASSIUM mmol/L 3.8 3.8 3.9 4.3   CO2 mmol/L 27 27 27 26   CHLORIDE mmol/L 105 104 104 103   BUN mg/dL 26* 29* 30* 26*   CREATININE mg/dL 1.09 1.13 1.27 1.39*        Nutrition Specific Medications:  Scheduled medications  allopurinol, 100 mg, oral, Daily  cholecalciferol, 2,000 Units, oral, Daily  divalproex sprinkle, 250 mg, oral, BID  LORazepam, 0.25 mg, oral, BID  metoprolol succinate XL, 50 mg, oral, Daily  pantoprazole, 40 mg, oral, Daily before breakfast   Or  pantoprazole, 40 mg, intravenous, Daily before breakfast  [Held by provider] QUEtiapine, 25 mg, oral, Nightly  tamsulosin, 0.4 mg, oral, Daily  venlafaxine, 50 mg, oral, Daily  warfarin, 7.5 mg, oral, Daily      Continuous medications     PRN medications  PRN medications: acetaminophen **OR** acetaminophen **OR** acetaminophen, acetaminophen **OR** acetaminophen **OR** acetaminophen, dextrose, dextrose, glucagon, glucagon, magnesium hydroxide, melatonin, ondansetron ODT **OR** ondansetron      I/O:   Last BM Date: 10/31/24;      Dietary Orders (From admission, onward)       Start     Ordered    11/04/24 0410  Adult diet Regular; Soft and bite sized 6; Thin 0; 1:1 Feeding, No straw  Diet effective now        Comments: Strategies:   - Small bites  - Small, single sips  - Alternate consistencies  - Add moisture to dry foods  - Upright for all PO intake  - Medications crushed in puree (verify with MD)  - Limit distractions  - Complete oral care frequently throughout the day  - Full supervision with meals; One to one assist with meals   Question Answer Comment   Diet type Regular    Texture Soft and bite sized 6    Fluid consistency Thin 0    Select tray type: 1:1 Feeding    Select tray type: No straw        11/04/24 0411    10/29/24 0854  Oral nutritional supplements  Until discontinued        Question Answer Comment   Deliver with All meals    Select supplement: Product from home - please specify    Additional Details ENSURE COMPACT - no chocolate         10/29/24 0854    10/29/24 0400  May Not Participate in Room Service  ( ROOM SERVICE MAY NOT PARTICIPATE)  Once        Question:  .  Answer:  Yes    10/29/24 0359                     Estimated Needs:   Total Energy Estimated Needs (kCal): 1630 kCal  Method for Estimating Needs: MSJ (1254) x 1.3 x 1.0  Total Protein Estimated Needs (g): 63 g  Method for Estimating Needs: 1.0g/kg  Total Fluid Estimated Needs (mL): 1600 mL  Method for Estimating Needs: 1mL/kcal        Nutrition Diagnosis   Malnutrition Diagnosis  Patient has Malnutrition Diagnosis: Yes  Diagnosis Status: Ongoing  Malnutrition Diagnosis: Mild malnutrition related to acute disease or injury  As Evidenced by: sudden poor oral intake just prior or during start of admission along with mild to moderate loss of subcutaneous fat and muscle wasting.    Nutrition Diagnosis  Patient has Nutrition Diagnosis: Yes  Diagnosis Status (1): New  Nutrition Diagnosis 1: Inadequate energy intake  Related to (1): altered mental status  As Evidenced by (1): pt dependent on staff feeding him and has not eaten more than 25% of meals served since admit.       Nutrition Interventions/Recommendations         Nutrition Prescription:  Individualized Nutrition Prescription Provided for : Follow SLP recommendations for least restrictive diet consistency. Pt now on LEVEL 6 (Soft and bite sized) with thin liquids.        Nutrition Interventions:   Interventions: Meals and snacks, Medical food supplement  Meals and Snacks: Texture-modified diet  Goal: IDDSI Level 6 per SLP  Medical Food Supplement: Commercial beverage  Goal: ENSURE COMPACT (no екатерина) with bfast (220 kcal/9g protein), MAGIC CUP (290 kcal/9g pro) with lunch, GELATEIN (160 kcal/20g pro) for dinner  Feeding Assistance: Feeding position, Meal set-up, Menu selection assistance  Goal: Staff to feed patient.         Nutrition Education:   Pt not appropriate.       Nutrition Monitoring and Evaluation   Food/Nutrient Related  History Monitoring  Monitoring and Evaluation Plan: Amount of food, Mealtime behavior  Amount of Food: Estimated amout of food  Criteria: Pt to consume >50% of meals and ONS.  Criteria: Pt to be able to eat independently.    Body Composition/Growth/Weight History  Monitoring and Evaluation Plan: Weight  Weight: Measured weight  Criteria: Maintain stable weight.         Nutrition Focused Physical Findings  Monitoring and Evaluation Plan: Digestive System  Digestive System: Anorexia  Criteria: to improve         Time Spent (min): 30 minutes

## 2024-11-05 NOTE — CARE PLAN
Problem: Nutrition  Goal: Less than 5 days NPO/clear liquids  Outcome: Progressing  Goal: Oral intake greater than 50%  Outcome: Progressing  Goal: Oral intake greater 75%  Outcome: Progressing  Goal: Consume prescribed supplement  Outcome: Progressing  Goal: Adequate PO fluid intake  Outcome: Progressing  Goal: Nutrition support goals are met within 48 hrs  Outcome: Progressing  Goal: Nutrition support is meeting 75% of nutrient needs  Outcome: Progressing  Goal: BG  mg/dL  Outcome: Progressing  Goal: Lab values WNL  Outcome: Progressing  Goal: Electrolytes WNL  Outcome: Progressing  Goal: Promote healing  Outcome: Progressing  Goal: Maintain stable weight  Outcome: Progressing  Goal: Reduce weight from edema/fluid  Outcome: Progressing  Goal: Gradual weight gain  Outcome: Progressing     Problem: Skin  Goal: Decreased wound size/increased tissue granulation at next dressing change  Outcome: Progressing  Goal: Participates in plan/prevention/treatment measures  Outcome: Progressing  Goal: Prevent/manage excess moisture  Outcome: Progressing  Goal: Prevent/minimize sheer/friction injuries  Outcome: Progressing  Flowsheets (Taken 11/5/2024 0442)  Prevent/minimize sheer/friction injuries: Turn/reposition every 2 hours/use positioning/transfer devices  Goal: Promote/optimize nutrition  Outcome: Progressing  Goal: Promote skin healing  Outcome: Progressing     Problem: Fall/Injury  Goal: Not fall by end of shift  Outcome: Progressing  Goal: Be free from injury by end of the shift  Outcome: Progressing  Goal: Verbalize understanding of personal risk factors for fall in the hospital  Outcome: Progressing  Goal: Verbalize understanding of risk factor reduction measures to prevent injury from fall in the home  Outcome: Progressing  Goal: Use assistive devices by end of the shift  Outcome: Progressing  Goal: Pace activities to prevent fatigue by end of the shift  Outcome: Progressing     Problem: Pain - Adult  Goal:  Verbalizes/displays adequate comfort level or baseline comfort level  Outcome: Progressing     Problem: Safety - Adult  Goal: Free from fall injury  Outcome: Progressing     Problem: Discharge Planning  Goal: Discharge to home or other facility with appropriate resources  Outcome: Progressing     Problem: Chronic Conditions and Co-morbidities  Goal: Patient's chronic conditions and co-morbidity symptoms are monitored and maintained or improved  Outcome: Progressing     Problem: Infection related to problem list condition  Goal: Infection will resolve through treatment  Outcome: Progressing   The patient's goals for the shift include      The clinical goals for the shift include pt will remain free from fall/injury

## 2024-11-05 NOTE — PROGRESS NOTES
"Slim Saldivar is a 89 y.o. male on day 7 of admission presenting with FLORIN (acute kidney injury) (CMS-[facility]).    Subjective   Patient seen examined at bedside.  Patient is slightly more restless today.  He is sitting at the nursing station wanting new socks.  He has no specific complaints.  I spoke with the son several times via phone this afternoon about recommendations for either straight cath or Calhoun catheter going forward as he failed voiding trial again.  His memory care unit is unable to do straight catheterization and so ultimately the son decided SNF will be best and he can have continued therapy and get stronger back to his baseline.  TCC notified.        Objective     Physical Exam    Constitutional: Well developed, no distress, easily woken, alert and cooperative, thin   Skin: Warm and dry  Eyes: EOMI, makes eye contact   ENMT: mucous membranes slightly dry   Respiratory: Patent airways, CTAB  Cardiovascular: Regular, rate and rhythm  Abdominal: Nondistended, soft, non-tender, +BS  Neuro: awake and alert x1, pleasantly confused, follows simple commands, answers social niceties     Last Recorded Vitals  Blood pressure 111/73, pulse 84, temperature 36 °C (96.8 °F), temperature source Temporal, resp. rate 17, height 1.702 m (5' 7\"), weight 63.9 kg (140 lb 14 oz), SpO2 95%.  Intake/Output last 3 Shifts:  I/O last 3 completed shifts:  In: 240 (3.8 mL/kg) [P.O.:240]  Out: 750 (11.7 mL/kg) [Urine:750 (0.3 mL/kg/hr)]  Weight: 63.9 kg     Relevant Results  Scheduled medications  allopurinol, 100 mg, oral, Daily  cholecalciferol, 2,000 Units, oral, Daily  divalproex sprinkle, 250 mg, oral, BID  LORazepam, 0.25 mg, oral, BID  metoprolol succinate XL, 50 mg, oral, Daily  pantoprazole, 40 mg, oral, Daily before breakfast   Or  pantoprazole, 40 mg, intravenous, Daily before breakfast  [Held by provider] QUEtiapine, 25 mg, oral, Nightly  tamsulosin, 0.4 mg, oral, Daily  venlafaxine, 50 mg, oral, Daily  warfarin, 7.5 mg, " oral, Daily      Continuous medications       PRN medications  PRN medications: acetaminophen **OR** acetaminophen **OR** acetaminophen, acetaminophen **OR** acetaminophen **OR** acetaminophen, dextrose, dextrose, glucagon, glucagon, magnesium hydroxide, melatonin, ondansetron ODT **OR** ondansetron  Results from last 7 days   Lab Units 11/05/24  0603 11/04/24  0617 11/03/24  0704   WBC AUTO x10*3/uL 4.5 6.0 7.1   RBC AUTO x10*6/uL 2.74* 2.64* 2.63*   HEMOGLOBIN g/dL 8.3* 8.0* 8.0*     Results from last 7 days   Lab Units 11/05/24  0603 11/04/24  0616 11/03/24  0705   SODIUM mmol/L 139 138 139   POTASSIUM mmol/L 3.8 3.8 3.9   CHLORIDE mmol/L 105 104 104   CO2 mmol/L 27 27 27   BUN mg/dL 26* 29* 30*   CREATININE mg/dL 1.09 1.13 1.27   CALCIUM mg/dL 8.6 8.4* 8.2*   PHOSPHORUS mg/dL 2.9 2.9 3.1   MAGNESIUM mg/dL 1.71 1.74 1.75       FL modified barium swallow study    Result Date: 11/5/2024  Interpreted By:  Alek Concepcion and Hetrick Bethany STUDY: FL MODIFIED BARIUM SWALLOW STUDY;; 11/4/2024 11:00 am   INDICATION: Signs/Symptoms:Coughing and multiple swallows (x3) per bolus regular consistency; coughing also noted following consecutive swallows via straw (i.e., 2 sips at a time)..     COMPARISON: None.   ACCESSION NUMBER(S): [id number]   ORDERING CLINICIAN: VAN AVALOS   TECHNIQUE: Modified Barium Swallow Study completed. Informed verbal consent obtained prior to completion of exam. The study was completed per modified protocol with various liquid barium consistencies, pudding, solids and a 13mm barium tablet.  A 1.9 cm or .75 inch (outer diameter) ring did not adhere due to presence of facial hair. AP view was unable to be completed due to time/radiation constraints. The anatomic structures and function of the oropharynx, larynx, hypopharynx and cervical esophagus were evaluated.   SLP: Susie Greene, CCC-SLP Contact info: [employer] secure chat; phone: [phone]   SPEECH FINDINGS: Reason for Referral: Suspected  oral or pharyngeal dysphagia Patient Hx:  89 y.o. year old male who presented to the ED on 10/28/24 for fall on Coumadin.  Initially the facility-concerned that he may have hit his head, refer on for interview revealed that he was leaning over his bed when he slumped backwards and landed without head injury.  He is oriented x 1 at baseline.  PMHx includes dementia, hypertension, A-fib on Coumadin, CKD 3. MBSS was recommended per results of clinical swallow evaluation. Respiratory Status: Room air Current diet: Soft and bite-sized solids and thin liquids per clinical swallow evaluation 11/3/24   Pain: Pain Scale: 0-10 Ratin   FINAL SPEECH RECOMMENDATIONS   DIET: DIET RECOMMENDATIONS: - Soft & Bite Sized (IDDSI Level 6) - Thin liquids (IDDSI Level 0) Per the results of today's MBSS, patient to continue baseline diet of regular consistencies and thin liquids following swallow strategies listed below:   STRATEGIES: - Small bites - Small, single sips - Upright for all PO intake - Swallow 2-3 times per bite/sip - No straws - Medications crushed in puree (verify with MD) - Complete oral care frequently throughout the day - Full supervision with meals; One to one assist with meals   EXERCISES: Effortful swallow (as cognitive status permits)   SLP PLAN: Skilled SLP Services: Skilled SLP intervention for dysphagia is warranted. SLP Frequency: 2x per week Duration: 2 weeks Treatment/Interventions: - Oropharyngeal exercises (as able) - Compensatory strategy training - Diet tolerance/advancement - Patient/caregiver education   Discussed POC: Patient Discussed Risks/Benefits: Yes Patient/Caregiver Agreeable: Yes   Short term goals established 24: 1. Patient will tolerate recommended PO diet absent of overt s/s of aspiration in 90% of observed therapeutic trials. 2. Patient will implement safe swallowing strategies to reduce risk of aspiration in 90% of trials given caregiver assistance/cueing as needed. 3. Patient will  complete recommended swallowing exercise (effortful swallow) for at least 30 repetitions during treatment session given minimal-moderate cues.     Education Provided: Patient educated on results of MBS study, recommended diet, and recommended safe swallow strategies. Patient verbalized questionable understanding of education provided, consistent with cognitive status. Education will be reinforced.   Additional Medical Consults Suggested: None currently   Repeat Study: Per MD or treating SLP   Mechanics of the Swallow Summary: ORAL PHASE: Lip Closure - Interlabial escape/no progression to anterior lip Tongue Control During Bolus Hold - Cohesive bolus between tongue to palatal seal Bolus prep/mastication - Disorganized mastication with solid pieces of bolus unchewed Bolus transport/lingual motion - Repetitive/disorganized tongue motion (tongue pumping) Oral residue - Residue collection on oral structure   PHARYNGEAL PHASE: Initiation of pharyngeal swallow - Bolus head at pit of pyriforms Soft palate elevation - Trace column of contrast or air between soft palate and pharyngeal wall Laryngeal elevation - Complete superior movement of thyroid cartilage with contact of arytenoids to epiglottic petiole Anterior hyoid excursion - Partial anterior movement Epiglottic movement - Partial inversion Laryngeal vestibule closure - Incomplete - narrow column of air/contrast in laryngeal vestibule Pharyngeal stripping wave - Present, however, diminished Pharyngeal contraction (A/P view) - Not tested Pharyngoesophageal segment opening - Partial distension/partial duration with partial obstruction of flow of bolus Tongue base retraction - Wide column of contrast or air between tongue base and pharyngeal wall Pharyngeal residue - Collection of residue within or on the pharyngeal structures   ESOPHAGEAL PHASE: Esophageal clearance - Not evaluated     SLP Impressions with Severity Rating: Pt presents with moderate oropharyngeal  dysphagia upon completion of modified barium swallow study this date. Swallowing physiology is detailed above. Impairments most impacting swallowing safety and efficiency include decreased lingual propulsion, impaired mastication, reduced tongue base retraction, reduced hyolaryngeal excursion, partial epiglottic inversion, incomplete laryngeal vestibule closure, diminished pharyngeal stripping wave, and decreased PES opening. Patient demonstrated deep penetration of all consistencies except solids (although around 50% of solid bolus was retained in vallecula). Cued throat clear was somewhat effective in ejecting contrast. Trace SILENT aspiration was visualized for single and consecutive straw sips of thin liquids. Patient demonstrated mild-moderate oral and pharyngeal residue that resulted in ongoing penetration. Cued throat clear was somewhat effective in ejecting contrast. Cued repeat swallows were somewhat effective in reducing residue.   Barium tablet was given in the lateral view with pudding, but remained in patient's oral cavity despite mildly thick liquid wash. Patient was unable to expectorate tablet when cued, and it was removed from his mouth. A-P view was unable to be completed due to time/radiation constraints.   Per the results of this assessment, and given that patient is stable on room air and without concern for aspiration-related complications at this time, recommend continuing soft and bite-sized solids and thin liquids, with STRICT aspiration precautions and oral care. ST to continue to follow during inpatient stay to ensure diet tolerance, to provide ongoing education/training in safe swallowing strategies to reduce risk of aspiration, and to direct swallowing exercises targeting deficits identified.     OUTCOME MEASURES: Functional Oral Intake Scale Functional Oral Intake Scale: Level 5        total oral diet with multiple consistencies, but requires special preparations and compensations      Rosenbek's Penetration Aspiration Scale Thin Liquids: 5. DEEP PENETRATION with HIGH ASPIRATION risk - contrast contacts vocal cords, visible residue 8. SILENT ASPIRATION - contrast passes glottis, visible residue, NO pt response During the Swallow After the Swallow Nectar Thick Liquids: 5. DEEP PENETRATION with HIGH ASPIRATION risk - contrast contacts vocal cords, visible residue During the Swallow After the Swallow Honey Thick Liquids: 5. DEEP PENETRATION with HIGH ASPIRATION risk - contrast contacts vocal cords, visible residue During the Swallow Puree: 5. DEEP PENETRATION with HIGH ASPIRATION risk - contrast contacts vocal cords, visible residue During the Swallow After the Swallow Solids: 1. NO ASPIRATION & NO PENETRATION - no aspiration, contrast does not enter airway Speech Therapy section of this report signed by Susie Greene MS, CCC-SLP on 11/4/2024 at 6:45 pm.   RADIOLOGY FINDINGS: Cervical spine degenerative changes.   Radiology section of this report signed by Alek Concepcion.       Silent aspiration with thin liquids. Deep laryngeal penetration with thicker consistencies.   MACRO: None   Signed by: Alek Concepcion 11/5/2024 9:40 AM Dictation workstation:   [id number]      Assessment/Plan   Principal Problem:    FLORIN (acute kidney injury) (CMS-[facility])  Active Problems:    Dementia with aggressive behavior (Multi)    Supratherapeutic INR    Fall    Acute cystitis    -continues to retain urine, straight cath q6hrs  -urology consulted   -Ucx neg   -voiding trial again today   -Cr 1.09, baseline of 1.3-1.4  -encourage PO intake   -A&O x1 at BL, appears to be at baseline mentation    -INR 1.3  -continue increased coumadin 7.5mg daily   -f/u AM INR   -continue home depakote, effexor and ativan   -PT/OT recommended moderate intensity, [facility] 11  -podiatry consulted for toe nail care     Dispo: Improving. Failed voiding trial again, straight cath q6hrs. Pt is medically stable for discharge to SNF. Awaiting choice.      Anju Salomon DO   Hospitalist      Addendum: incorrectly documented INR, edited to fix.

## 2024-11-06 LAB
ANION GAP SERPL CALC-SCNC: 11 MMOL/L (ref 10–20)
BUN SERPL-MCNC: 27 MG/DL (ref 6–23)
CALCIUM SERPL-MCNC: 8.5 MG/DL (ref 8.6–10.3)
CHLORIDE SERPL-SCNC: 104 MMOL/L (ref 98–107)
CO2 SERPL-SCNC: 26 MMOL/L (ref 21–32)
CREAT SERPL-MCNC: 1.13 MG/DL (ref 0.5–1.3)
EGFRCR SERPLBLD CKD-EPI 2021: 62 ML/MIN/1.73M*2
ERYTHROCYTE [DISTWIDTH] IN BLOOD BY AUTOMATED COUNT: 14.7 % (ref 11.5–14.5)
GLUCOSE BLD MANUAL STRIP-MCNC: 82 MG/DL (ref 74–99)
GLUCOSE BLD MANUAL STRIP-MCNC: 86 MG/DL (ref 74–99)
GLUCOSE BLD MANUAL STRIP-MCNC: 87 MG/DL (ref 74–99)
GLUCOSE BLD MANUAL STRIP-MCNC: 90 MG/DL (ref 74–99)
GLUCOSE SERPL-MCNC: 80 MG/DL (ref 74–99)
HCT VFR BLD AUTO: 25.5 % (ref 41–52)
HGB BLD-MCNC: 8.3 G/DL (ref 13.5–17.5)
INR PPP: 2.1 (ref 0.9–1.1)
MAGNESIUM SERPL-MCNC: 1.85 MG/DL (ref 1.6–2.4)
MCH RBC QN AUTO: 31.1 PG (ref 26–34)
MCHC RBC AUTO-ENTMCNC: 32.5 G/DL (ref 32–36)
MCV RBC AUTO: 96 FL (ref 80–100)
NRBC BLD-RTO: 0 /100 WBCS (ref 0–0)
PHOSPHATE SERPL-MCNC: 3 MG/DL (ref 2.5–4.9)
PLATELET # BLD AUTO: 211 X10*3/UL (ref 150–450)
POTASSIUM SERPL-SCNC: 3.8 MMOL/L (ref 3.5–5.3)
PROTHROMBIN TIME: 23.6 SECONDS (ref 9.8–12.8)
RBC # BLD AUTO: 2.67 X10*6/UL (ref 4.5–5.9)
SODIUM SERPL-SCNC: 137 MMOL/L (ref 136–145)
WBC # BLD AUTO: 5.5 X10*3/UL (ref 4.4–11.3)

## 2024-11-06 PROCEDURE — 99239 HOSP IP/OBS DSCHRG MGMT >30: CPT | Performed by: STUDENT IN AN ORGANIZED HEALTH CARE EDUCATION/TRAINING PROGRAM

## 2024-11-06 PROCEDURE — 1100000001 HC PRIVATE ROOM DAILY

## 2024-11-06 PROCEDURE — 85027 COMPLETE CBC AUTOMATED: CPT | Performed by: STUDENT IN AN ORGANIZED HEALTH CARE EDUCATION/TRAINING PROGRAM

## 2024-11-06 PROCEDURE — 2500000001 HC RX 250 WO HCPCS SELF ADMINISTERED DRUGS (ALT 637 FOR MEDICARE OP): Performed by: STUDENT IN AN ORGANIZED HEALTH CARE EDUCATION/TRAINING PROGRAM

## 2024-11-06 PROCEDURE — 82947 ASSAY GLUCOSE BLOOD QUANT: CPT

## 2024-11-06 PROCEDURE — 36415 COLL VENOUS BLD VENIPUNCTURE: CPT | Performed by: STUDENT IN AN ORGANIZED HEALTH CARE EDUCATION/TRAINING PROGRAM

## 2024-11-06 PROCEDURE — 85610 PROTHROMBIN TIME: CPT | Performed by: STUDENT IN AN ORGANIZED HEALTH CARE EDUCATION/TRAINING PROGRAM

## 2024-11-06 PROCEDURE — 51702 INSERT TEMP BLADDER CATH: CPT

## 2024-11-06 PROCEDURE — 83735 ASSAY OF MAGNESIUM: CPT | Performed by: STUDENT IN AN ORGANIZED HEALTH CARE EDUCATION/TRAINING PROGRAM

## 2024-11-06 PROCEDURE — 84100 ASSAY OF PHOSPHORUS: CPT | Performed by: STUDENT IN AN ORGANIZED HEALTH CARE EDUCATION/TRAINING PROGRAM

## 2024-11-06 PROCEDURE — 80048 BASIC METABOLIC PNL TOTAL CA: CPT | Performed by: STUDENT IN AN ORGANIZED HEALTH CARE EDUCATION/TRAINING PROGRAM

## 2024-11-06 RX ORDER — WARFARIN 3 MG/1
7.5 TABLET ORAL NIGHTLY
Start: 2024-11-06

## 2024-11-06 ASSESSMENT — PAIN SCALES - PAIN ASSESSMENT IN ADVANCED DEMENTIA (PAINAD)
CONSOLABILITY: NO NEED TO CONSOLE
TOTALSCORE: 0
BODYLANGUAGE: RELAXED
FACIALEXPRESSION: SMILING OR INEXPRESSIVE
BREATHING: NORMAL

## 2024-11-06 ASSESSMENT — COGNITIVE AND FUNCTIONAL STATUS - GENERAL
EATING MEALS: A LITTLE
EATING MEALS: A LITTLE
TURNING FROM BACK TO SIDE WHILE IN FLAT BAD: A LOT
DRESSING REGULAR LOWER BODY CLOTHING: A LOT
DAILY ACTIVITIY SCORE: 13
MOVING TO AND FROM BED TO CHAIR: A LOT
DRESSING REGULAR UPPER BODY CLOTHING: A LOT
DRESSING REGULAR UPPER BODY CLOTHING: A LOT
MOVING FROM LYING ON BACK TO SITTING ON SIDE OF FLAT BED WITH BEDRAILS: A LITTLE
TOILETING: A LOT
DAILY ACTIVITIY SCORE: 13
MOBILITY SCORE: 12
WALKING IN HOSPITAL ROOM: A LOT
HELP NEEDED FOR BATHING: A LOT
PERSONAL GROOMING: A LOT
DRESSING REGULAR LOWER BODY CLOTHING: A LOT
PERSONAL GROOMING: A LOT
STANDING UP FROM CHAIR USING ARMS: A LOT
TOILETING: A LOT
MOBILITY SCORE: 12
STANDING UP FROM CHAIR USING ARMS: A LOT
MOVING FROM LYING ON BACK TO SITTING ON SIDE OF FLAT BED WITH BEDRAILS: A LITTLE
TURNING FROM BACK TO SIDE WHILE IN FLAT BAD: A LOT
MOVING TO AND FROM BED TO CHAIR: A LOT
CLIMB 3 TO 5 STEPS WITH RAILING: TOTAL
HELP NEEDED FOR BATHING: A LOT
WALKING IN HOSPITAL ROOM: A LOT
CLIMB 3 TO 5 STEPS WITH RAILING: TOTAL

## 2024-11-06 ASSESSMENT — PAIN SCALES - GENERAL: PAINLEVEL_OUTOF10: 0 - NO PAIN

## 2024-11-06 NOTE — PROGRESS NOTES
11/06/24 0918   Discharge Planning   Home or Post Acute Services Post acute facilities (Rehab/SNF/etc)   Type of Post Acute Facility Services Skilled nursing   Expected Discharge Disposition SNF   Does the patient need discharge transport arranged? Yes   RoundTrip coordination needed? Yes   Has discharge transport been arranged? No     Patient will need to discharge to a SNF facility due to needing the watts at discharge. Son, Herbert is agreeable to plan, emailed a choice list to [email or web address]. He will review and call me back with his top choices.     11/06: I attempted to update Aggie with [facility]. No answer and no voice mail option.     1430; Voice mail left for son regarding SNF choice.

## 2024-11-06 NOTE — DISCHARGE SUMMARY
Discharge Diagnosis  FLORIN (acute kidney injury) (CMS-[facility])    Issues Requiring Follow-Up  Urinary retention     Discharge Meds     Medication List      START taking these medications     tamsulosin 0.4 mg 24 hr capsule; Commonly known as: Flomax; Take 1   capsule (0.4 mg) by mouth once daily.     CHANGE how you take these medications     warfarin 3 mg tablet; Commonly known as: Coumadin; Take 2.5 tablets (7.5   mg) by mouth once daily at bedtime. Take as directed per After Visit   Summary. Adjust as needed to maintain INR 2-3; What changed: how much to   take, additional instructions, Another medication with the same name was   removed. Continue taking this medication, and follow the directions you   see here.     CONTINUE taking these medications     acetaminophen 325 mg tablet; Commonly known as: Tylenol   allopurinol 100 mg tablet; Commonly known as: Zyloprim; TAKE 1 TABLET   DAILY   Anti-DiarrheaL (loperamide) 2 mg tablet; Generic drug: loperamide   cholecalciferol (vitamin D3) 50 mcg (2,000 unit) tablet,chewable   divalproex sprinkle 125 mg DR capsule; Commonly known as: Depakote   Sprinkle   LORazepam 0.5 mg tablet; Commonly known as: Ativan   metoprolol succinate XL 50 mg 24 hr tablet; Commonly known as: Toprol-XL   Milk of Magnesia 400 mg/5 mL suspension; Generic drug: magnesium   hydroxide   venlafaxine 50 mg tablet; Commonly known as: Effexor     STOP taking these medications     QUEtiapine 25 mg tablet; Commonly known as: SEROquel       Test Results Pending At Discharge  Pending Labs       No current pending labs.            Hospital Course   This an 89-year-old male who presented after referral found to have significant FLORIN and large amount of retained urine.  He was also noted to be supratherapeutic on his INR on admission which did resolve and warfarin was adjusted.  Urology was consulted.  Patient was started on Flomax and a Calhoun catheter.  Renal function improved to better than baseline and  unfortunately patient failed to on voiding trials and ultimately needed Calhoun catheter replaced.  PT OT recommended skilled nursing facility.  After lengthy discussions with family about skilled nursing versus memory care ultimately they will proceed with skilled nursing.  Patient was determined to be medically stable for discharge to SNF with instructions for close follow-up.    D/c >30min    Pertinent Physical Exam At Time of Discharge  Physical Exam  Constitutional: Well developed, no distress, alert and cooperative, thin   Skin: Warm and dry  Eyes: EOMI, makes eye contact   Respiratory: Patent airways, CTAB  Cardiovascular: Regular, rate and rhythm  Abdominal: Nondistended, soft, non-tender, +BS  Neuro: awake and alert x1, pleasantly confused, answers social niceties     Outpatient Follow-Up  No future appointments.    Anju Salomon,       Addendum 11/13/24; patient discharged by Dr. Anju Salomon as mentioned above; did not physically leave hospital until 11/8/24 without changes to above document by Dr. Salomon.

## 2024-11-06 NOTE — PROGRESS NOTES
Occupational Therapy                 Therapy Communication Note    Patient Name: Slim Saldivar  MRN: [id number]  Department: [facility] 3 S  Room: [facility]/3116-A  Today's Date: 11/6/2024     Discipline: Occupational Therapy    Missed Visit Reason:  OT orders received and chart reviewed. Spoke to RN, states pt. Has been lethargic and sleeping. Will hold and attempt again as able.     Missed Time: Attempt    Comment:

## 2024-11-06 NOTE — CARE PLAN
Problem: Nutrition  Goal: Less than 5 days NPO/clear liquids  Outcome: Progressing  Goal: Oral intake greater than 50%  Outcome: Progressing  Goal: Oral intake greater 75%  Outcome: Progressing  Goal: Consume prescribed supplement  Outcome: Progressing  Goal: Adequate PO fluid intake  Outcome: Progressing  Goal: Nutrition support goals are met within 48 hrs  Outcome: Progressing  Goal: Nutrition support is meeting 75% of nutrient needs  Outcome: Progressing  Goal: BG  mg/dL  Outcome: Progressing  Goal: Lab values WNL  Outcome: Progressing  Goal: Electrolytes WNL  Outcome: Progressing  Goal: Promote healing  Outcome: Progressing  Goal: Maintain stable weight  Outcome: Progressing  Goal: Reduce weight from edema/fluid  Outcome: Progressing  Goal: Gradual weight gain  Outcome: Progressing     Problem: Skin  Goal: Decreased wound size/increased tissue granulation at next dressing change  Outcome: Progressing  Goal: Participates in plan/prevention/treatment measures  Outcome: Progressing  Goal: Prevent/manage excess moisture  Outcome: Progressing  Goal: Prevent/minimize sheer/friction injuries  Outcome: Progressing  Flowsheets (Taken 11/6/2024 0632)  Prevent/minimize sheer/friction injuries: Use pull sheet  Goal: Promote/optimize nutrition  Outcome: Progressing  Goal: Promote skin healing  Outcome: Progressing     Problem: Fall/Injury  Goal: Not fall by end of shift  Outcome: Progressing  Goal: Be free from injury by end of the shift  Outcome: Progressing  Goal: Verbalize understanding of personal risk factors for fall in the hospital  Outcome: Progressing  Goal: Verbalize understanding of risk factor reduction measures to prevent injury from fall in the home  Outcome: Progressing  Goal: Use assistive devices by end of the shift  Outcome: Progressing  Goal: Pace activities to prevent fatigue by end of the shift  Outcome: Progressing     Problem: Pain - Adult  Goal: Verbalizes/displays adequate comfort level or  baseline comfort level  Outcome: Progressing     Problem: Safety - Adult  Goal: Free from fall injury  Outcome: Progressing     Problem: Discharge Planning  Goal: Discharge to home or other facility with appropriate resources  Outcome: Progressing     Problem: Chronic Conditions and Co-morbidities  Goal: Patient's chronic conditions and co-morbidity symptoms are monitored and maintained or improved  Outcome: Progressing     Problem: Infection related to problem list condition  Goal: Infection will resolve through treatment  Outcome: Progressing   The patient's goals for the shift include      The clinical goals for the shift include pt will remain free from fall/injury

## 2024-11-06 NOTE — PROGRESS NOTES
11/06/24 1552   Discharge Planning   Living Arrangements Alone   Support Systems Family members;Children   Type of Residence Assisted living  ([facility] memory unit)   Home or Post Acute Services Post acute facilities (Rehab/SNF/etc)   Type of Post Acute Facility Services Skilled nursing   Expected Discharge Disposition SNF     Received call from the pt's son. He did not receive the emailed snf list. Confirmed the correct email address and resent the list.  He will review it tonight with his sister and send his choices in care\[facility]\"". Will send the referrals in the am.

## 2024-11-07 LAB
GLUCOSE BLD MANUAL STRIP-MCNC: 116 MG/DL (ref 74–99)
GLUCOSE BLD MANUAL STRIP-MCNC: 94 MG/DL (ref 74–99)
GLUCOSE BLD MANUAL STRIP-MCNC: 97 MG/DL (ref 74–99)
HOLD SPECIMEN: NORMAL
HOLD SPECIMEN: NORMAL
INR PPP: 2 (ref 0.9–1.1)
PROTHROMBIN TIME: 22.4 SECONDS (ref 9.8–12.8)

## 2024-11-07 PROCEDURE — 82947 ASSAY GLUCOSE BLOOD QUANT: CPT

## 2024-11-07 PROCEDURE — 2500000001 HC RX 250 WO HCPCS SELF ADMINISTERED DRUGS (ALT 637 FOR MEDICARE OP): Performed by: INTERNAL MEDICINE

## 2024-11-07 PROCEDURE — 97530 THERAPEUTIC ACTIVITIES: CPT | Mod: GP,CQ

## 2024-11-07 PROCEDURE — 2500000001 HC RX 250 WO HCPCS SELF ADMINISTERED DRUGS (ALT 637 FOR MEDICARE OP): Performed by: STUDENT IN AN ORGANIZED HEALTH CARE EDUCATION/TRAINING PROGRAM

## 2024-11-07 PROCEDURE — 1100000001 HC PRIVATE ROOM DAILY

## 2024-11-07 PROCEDURE — 99232 SBSQ HOSP IP/OBS MODERATE 35: CPT | Performed by: STUDENT IN AN ORGANIZED HEALTH CARE EDUCATION/TRAINING PROGRAM

## 2024-11-07 PROCEDURE — 36415 COLL VENOUS BLD VENIPUNCTURE: CPT | Performed by: STUDENT IN AN ORGANIZED HEALTH CARE EDUCATION/TRAINING PROGRAM

## 2024-11-07 PROCEDURE — 85610 PROTHROMBIN TIME: CPT | Performed by: STUDENT IN AN ORGANIZED HEALTH CARE EDUCATION/TRAINING PROGRAM

## 2024-11-07 PROCEDURE — 2500000002 HC RX 250 W HCPCS SELF ADMINISTERED DRUGS (ALT 637 FOR MEDICARE OP, ALT 636 FOR OP/ED): Performed by: STUDENT IN AN ORGANIZED HEALTH CARE EDUCATION/TRAINING PROGRAM

## 2024-11-07 PROCEDURE — 2500000005 HC RX 250 GENERAL PHARMACY W/O HCPCS: Performed by: INTERNAL MEDICINE

## 2024-11-07 PROCEDURE — 2500000002 HC RX 250 W HCPCS SELF ADMINISTERED DRUGS (ALT 637 FOR MEDICARE OP, ALT 636 FOR OP/ED): Performed by: INTERNAL MEDICINE

## 2024-11-07 PROCEDURE — 92526 ORAL FUNCTION THERAPY: CPT | Mod: GN | Performed by: SPEECH-LANGUAGE PATHOLOGIST

## 2024-11-07 ASSESSMENT — PAIN SCALES - PAIN ASSESSMENT IN ADVANCED DEMENTIA (PAINAD)
BREATHING: NORMAL
BODYLANGUAGE: RELAXED
CONSOLABILITY: NO NEED TO CONSOLE
FACIALEXPRESSION: SMILING OR INEXPRESSIVE
TOTALSCORE: 0

## 2024-11-07 ASSESSMENT — PAIN SCALES - GENERAL
PAINLEVEL_OUTOF10: 0 - NO PAIN

## 2024-11-07 ASSESSMENT — COGNITIVE AND FUNCTIONAL STATUS - GENERAL
TURNING FROM BACK TO SIDE WHILE IN FLAT BAD: A LOT
TOILETING: A LOT
CLIMB 3 TO 5 STEPS WITH RAILING: TOTAL
DRESSING REGULAR LOWER BODY CLOTHING: A LOT
MOVING FROM LYING ON BACK TO SITTING ON SIDE OF FLAT BED WITH BEDRAILS: A LITTLE
DRESSING REGULAR UPPER BODY CLOTHING: A LOT
STANDING UP FROM CHAIR USING ARMS: A LOT
MOVING TO AND FROM BED TO CHAIR: A LOT
DRESSING REGULAR LOWER BODY CLOTHING: A LOT
WALKING IN HOSPITAL ROOM: A LOT
HELP NEEDED FOR BATHING: A LOT
PERSONAL GROOMING: A LOT
DAILY ACTIVITIY SCORE: 13
EATING MEALS: A LITTLE
MOVING FROM LYING ON BACK TO SITTING ON SIDE OF FLAT BED WITH BEDRAILS: A LOT
TURNING FROM BACK TO SIDE WHILE IN FLAT BAD: A LOT
WALKING IN HOSPITAL ROOM: TOTAL
HELP NEEDED FOR BATHING: A LOT
STANDING UP FROM CHAIR USING ARMS: A LOT
MOBILITY SCORE: 11
DRESSING REGULAR UPPER BODY CLOTHING: A LOT
MOBILITY SCORE: 10
MOVING TO AND FROM BED TO CHAIR: A LOT
CLIMB 3 TO 5 STEPS WITH RAILING: TOTAL
MOBILITY SCORE: 12
MOVING TO AND FROM BED TO CHAIR: A LOT
WALKING IN HOSPITAL ROOM: TOTAL
TURNING FROM BACK TO SIDE WHILE IN FLAT BAD: A LOT
EATING MEALS: TOTAL
STANDING UP FROM CHAIR USING ARMS: A LOT
MOVING FROM LYING ON BACK TO SITTING ON SIDE OF FLAT BED WITH BEDRAILS: A LITTLE
TOILETING: A LOT
DAILY ACTIVITIY SCORE: 11
PERSONAL GROOMING: A LOT
CLIMB 3 TO 5 STEPS WITH RAILING: TOTAL

## 2024-11-07 ASSESSMENT — PAIN - FUNCTIONAL ASSESSMENT
PAIN_FUNCTIONAL_ASSESSMENT: 0-10

## 2024-11-07 NOTE — PROGRESS NOTES
Speech-Language Pathology    SLP Adult Inpatient Treatment     Patient Name: Slim Saldivar  MRN: [id number]  Today's Date: 11/7/2024  Time Calculation  Start Time: 1808  Stop Time: 1820  Time Calculation (min): 12 min         Current Problem:   1. FLORIN (acute kidney injury) (CMS-HCC)        2. Fall, initial encounter        3. Supratherapeutic INR        4. Atrial fibrillation, unspecified type (Multi)  Protime-INR    warfarin (Coumadin) 3 mg tablet    DISCONTINUED: warfarin (Coumadin) 3 mg tablet      5. Urinary retention  tamsulosin (Flomax) 0.4 mg 24 hr capsule        DIET RECOMMENDATIONS:     - Soft & Bite Sized (IDDSI Level 6)   - Thin liquids (IDDSI Level 0)   STRATEGIES:    - Small bites   - Small, single sips   - Upright for all PO intake   - Swallow 2-3 times per bite/sip   - No straws   - Medications crushed in puree (verify with MD)   - Complete oral care frequently throughout the day   - Full supervision with meals; One to one assist with meals     Therapeutic Swallow Interventions:?Compensatory strategy training, Patient/family education, Diet tolerance/advancement       SLP Assessment:    SLP TX Intervention Outcome: Making Progress Towards Goals      SLP Assessment Results: ?Patient seen for dysphagia therapy this date. Discussed recommended compensatory swallow techniques per 11/4/2024 MBSS. SLP provided training and instruction regarding compensatory swallow strategies and pharyngeal strengthening exercises. Patient able to state 1 safe swallow strategy independently. Given forced choice cues, patient able to state 6 safe swallow strategies. Verbal models were provided for the remainder.    Following thorough oral care, patient was presented with the following consistencies: 3 boluses puree, 3 boluses regular, and 3 boluses thin liquid via cup. Patient able to self feed all consistencies once handed to him by SLP, however, bolus size for thin liquids via cup was too large. Patient required [place]  assistance and minimal to moderate visual/verbal cues to consume smaller boluses from cup.    Oral phase: WFL   Clinical S/S of pharyngeal dysphagia: Immediate coughing noted following large single cups sips thin liquid self fed by patient. Patient with no S/S pharyngeal dysphagia with thin liquids upon receiving [facility] assistance and minimal verbal/visual cues to take small sips via cup.  Patient with no other S/S pharyngeal dysphagia this date.     Introduced patient's sole pharyngeal strengthening exercise: Effortful swallow.  Patient unable to complete same volitionally despite maximal visual/verbal/tactile cues. Given single ice chips, patient able to complete effortful swallows x8 given minimal to moderate visual/verbal/tactile cues. Patient able to complete 3 more effortful swallows with puree given similar cues.       ST to continue to follow?for ongoing assessment?of diet tolerance, generalization of safe swallow strategies,?and?pharyngeal strengthening exercise (effortful swallow).?   Treatment Tolerance: Tolerated well   Medical Staff Made Aware: Yes  Strengths: Ability to respond to forced choice cues   Barriers: Cognitive linguistic impairment   Education Provided: Yes       Prognosis: Fair   Treatment Provided: Yes?        Plan: Skilled speech therapy for dysphagia treatment continues to be warranted to provide training and instruction regarding the use of compensatory swallow strategies, to assess tolerance of current diet (and least restrictive diet level), to complete sole pharyngeal ?strengthening exercise, and provide patient/caregiver education in order to reduce risk of aspiration, dehydration and malnutrition.    Frequency: 2x per week    Duration: 2 weeks     Treatment/Interventions:    - Oropharyngeal exercises (as able)   - Compensatory strategy training   - Diet tolerance/advancement   - Patient/caregiver education   Discussed POC: Patient/RN   Discussed Risks/Benefits: Yes   Patient/Caregiver  Agreeable: Yes   SLP Discharge Recommendations: SNF     Subjective:    Patient was seen at bedside for skilled dysphagia treatment. Assisted patient into upright position.      General Visit Information:    MBSS? was completed 11/4/2024. Please note above diet recommendations. Patient continues with this diet level.    RN indicates no difficulty with swallowing.      Pain:    Rating 0-10:?0?      Oxygen Status: Room air     Objective:   Goals (established 11/4/24): ?    1. Patient will tolerate recommended PO diet absent of overt s/s of aspiration in 90% of observed therapeutic trials.         Progressing. Patient with no S/S pharyngeal dysphagia given 3 boluses regular consistency, however, patient with S/S dysphagia with large sips thin liquid via cup.  2. Patient will implement safe swallowing strategies to reduce risk of aspiration in 90% of trials given caregiver assistance/cueing as needed.        Progressing. Patient able to self feed small boluses thin liquid via cup with [place] assist and verbal/visual cueing with no S/S dysphagia.  3. Patient will complete recommended swallowing exercise (effortful swallow) for at least 30 repetitions during treatment session given minimal-moderate cues.        Progressing. Patient able to complete effortful swallows x11 during PO trials and minimal to moderate verbal/visual cues.    Education:    Learner: ?patient; RN    Barriers to Learning: ?acuteness of illness barrier; cognitive limitations barrier; communication limitations barrier   Method: demonstration; verbal; visual    Education-Topic SLP provided patient/caregiver education regarding results and recommendations of tx, goals of treatment, and plan of care. Patient verbalized questionable comprehension, consistent with cognitive status. Education will be reinforced. SLP further coordinated with RN regarding recommendations and precautions per this assessment, with RN verbalizing understanding.    Outcome: Needs  review and reinforcement             ?            ?

## 2024-11-07 NOTE — PROGRESS NOTES
11/07/24 0837   Discharge Planning   Home or Post Acute Services Post acute facilities (Rehab/SNF/etc)   Type of Post Acute Facility Services Skilled nursing   Expected Discharge Disposition SNF   Does the patient need discharge transport arranged? Yes   RoundTrip coordination needed? Yes   Has discharge transport been arranged? No     Son responded in careport with SNF choices for [place], [facility], [facility], [facility], and [facility]. Message sent to [facility] to request to send new SNF referrals.

## 2024-11-07 NOTE — NURSING NOTE
Pt in bed resting laying on his left side with eyes closed appears comfortable, Pt favors the left side.

## 2024-11-07 NOTE — CARE PLAN
The patient's goals for the shift include rest    The clinical goals for the shift include pt will remain calm, cooperatiave this shift    Over the shift, the patient did not make progress toward the following goals-understanding safety measures. Barriers to progression include dementia. Recommendations to address these barriers include continued safety measures, frequent verbal reminding and physical cues, enc cooperation with care.    Problem: Fall/Injury  Goal: Verbalize understanding of personal risk factors for fall in the hospital  11/7/2024 8068 by Maggi Jeff RN  Outcome: Not Progressing  11/7/2024 0457 by Maggi Jeff, RN  Outcome: Progressing

## 2024-11-07 NOTE — CARE PLAN
The patient's goals for the shift include rest    The clinical goals for the shift include Pt will remain hemodynamically stable all shift    Problem: Skin  Goal: Participates in plan/prevention/treatment measures  Outcome: Progressing  Goal: Prevent/manage excess moisture  Outcome: Progressing  Goal: Prevent/minimize sheer/friction injuries  11/7/2024 1748 by Candice Champion RN  Outcome: Progressing  11/7/2024 1213 by Candice Champion RN  Flowsheets (Taken 11/7/2024 1213)  Prevent/minimize sheer/friction injuries:   Use pull sheet   Increase activity/out of bed for meals   HOB 30 degrees or less  Goal: Promote/optimize nutrition  Outcome: Progressing  Goal: Promote skin healing  Outcome: Progressing     Problem: Fall/Injury  Goal: Not fall by end of shift  Outcome: Progressing  Goal: Be free from injury by end of the shift  Outcome: Progressing  Goal: Verbalize understanding of personal risk factors for fall in the hospital  Outcome: Progressing  Goal: Verbalize understanding of risk factor reduction measures to prevent injury from fall in the home  Outcome: Progressing  Goal: Use assistive devices by end of the shift  Outcome: Progressing  Goal: Pace activities to prevent fatigue by end of the shift  Outcome: Progressing     Problem: Safety - Adult  Goal: Free from fall injury  Outcome: Progressing

## 2024-11-07 NOTE — PROGRESS NOTES
"Slim Saldivar is a 89 y.o. male on day 9 of admission presenting with FLORIN (acute kidney injury) (CMS-[facility]).    Subjective   Patient seen examined at bedside.  Patient is resting comfortably in bed and has no complaints.  He states he feels well today.       Objective     Physical Exam    Constitutional: Well developed, no distress, alert and cooperative, thin   Skin: Warm and dry  Eyes: EOMI, makes eye contact   ENMT: mucous membranes moist  Respiratory: Patent airways, CTAB  Cardiovascular: Regular, rate and rhythm  Abdominal: Nondistended, soft, non-tender, +BS  Neuro: awake and alert x1, pleasantly confused, follows simple commands, answers social niceties     Last Recorded Vitals  Blood pressure 105/67, pulse 105, temperature 36.5 °C (97.7 °F), temperature source Temporal, resp. rate 16, height 1.702 m (5' 7\"), weight 63.9 kg (140 lb 14 oz), SpO2 95%.  Intake/Output last 3 Shifts:  I/O last 3 completed shifts:  In: 240 (3.8 mL/kg) [P.O.:240]  Out: 1125 (17.6 mL/kg) [Urine:1125 (0.5 mL/kg/hr)]  Weight: 63.9 kg     Relevant Results  Scheduled medications  allopurinol, 100 mg, oral, Daily  cholecalciferol, 2,000 Units, oral, Daily  divalproex sprinkle, 250 mg, oral, BID  LORazepam, 0.25 mg, oral, BID  metoprolol succinate XL, 50 mg, oral, Daily  pantoprazole, 40 mg, oral, Daily before breakfast   Or  pantoprazole, 40 mg, intravenous, Daily before breakfast  [Held by provider] QUEtiapine, 25 mg, oral, Nightly  tamsulosin, 0.4 mg, oral, Daily  venlafaxine, 50 mg, oral, Daily  warfarin, 7.5 mg, oral, Daily      Continuous medications       PRN medications  PRN medications: acetaminophen **OR** acetaminophen **OR** acetaminophen, acetaminophen **OR** acetaminophen **OR** acetaminophen, dextrose, dextrose, glucagon, glucagon, magnesium hydroxide, melatonin, ondansetron ODT **OR** ondansetron  Results from last 7 days   Lab Units 11/06/24  0539 11/05/24  0603 11/04/24  0617   WBC AUTO x10*3/uL 5.5 4.5 6.0   RBC AUTO " x10*6/uL 2.67* 2.74* 2.64*   HEMOGLOBIN g/dL 8.3* 8.3* 8.0*     Results from last 7 days   Lab Units 11/06/24  0539 11/05/24  0603 11/04/24  0616   SODIUM mmol/L 137 139 138   POTASSIUM mmol/L 3.8 3.8 3.8   CHLORIDE mmol/L 104 105 104   CO2 mmol/L 26 27 27   BUN mg/dL 27* 26* 29*   CREATININE mg/dL 1.13 1.09 1.13   CALCIUM mg/dL 8.5* 8.6 8.4*   PHOSPHORUS mg/dL 3.0 2.9 2.9   MAGNESIUM mg/dL 1.85 1.71 1.74       No results found.     Assessment/Plan   Principal Problem:    FLORIN (acute kidney injury) (CMS-[facility])  Active Problems:    Dementia with aggressive behavior (Multi)    Supratherapeutic INR    Fall    Acute cystitis    -doing well  -urology consulted   -Ucx neg   -failed voiding trial x2, watts replaced  -Cr 1.09, baseline of 1.3-1.4  -encourage PO intake   -A&O x1 at BL, appears to be at baseline mentation    -INR 2.0  -continue coumadin at higher 7.5mg daily dose  -f/u AM INR   -continue home depakote, effexor and ativan   -PT/OT recommended moderate intensity, [facility] 11  -podiatry consulted for toe nail care     Dispo: Medically stable for discharge. Pending SNF precert.     Anju Salomon DO   Hospitalist      Addendum: incorrectly documented INR, edited to fix.

## 2024-11-07 NOTE — PROGRESS NOTES
Left message for pt's son to discuss snf responses and FOC.    2:50pm; Pt's son returned call.  He is agreeable with [facility] doing an on-site visit tomorrow.  After their determination, he will decide between them and [facility] (they accepted).  [facility] can accept with pt having a sitter; [facility] will need sitter free for 24 hrs.

## 2024-11-07 NOTE — PROGRESS NOTES
Physical Therapy    Physical Therapy Treatment    Patient Name: Slim Saldivar  MRN: [id number]  Today's Date: 11/7/2024  Time Calculation  Start Time: 1314  Stop Time: 1330  Time Calculation (min): 16 min     3116/3116-A    Assessment/Plan   End of Session Communication: Bedside nurse  Assessment Comment: Patient presents with fair effort throughout todays session. Poor ability to maintain attention to task, unmotivated to participate in acitivites, cognition limiting. Poor ability to follow simple, single step commands. Poor tolerance to seated EOB activity this date. Call light and all needs in reach.  End of Session Patient Position: Alarm on, Bed, 3 rail up (bed in chair position.)      General Visit Information:   PT  Visit  PT Received On: 11/07/24  General  Prior to Session Communication: Bedside nurse (1:1sitter present)  Patient Position Received: Bed, 3 rail up, Alarm on  General Comment: Pt agreeable to therapy this date.  Subjective     Precautions:  Precautions  Medical Precautions: Fall precautions       Objective     Pain:  Pain Assessment  Pain Assessment: 0-10  0-10 (Numeric) Pain Score: 0 - No pain    Cognition:  Cognition  Overall Cognitive Status: Impaired  Orientation Level: Disoriented to place, Disoriented to time, Disoriented to situation  Safety Judgment: Decreased awareness of need for assistance  Problem Solving: Assistance required to implement solutions  Memory: Exceptions to WFL  Problem Solving: Exceptions to WFL  Insight: Moderate  Impulsive: Moderately  Processing Speed: Within funtional limits           Treatments:  Therapeutic Exercise  Therapeutic Exercise Performed: Yes  Therapeutic Exercise Activity 1: seated, BLE LAQ, marches, hip ABD/ADD, attmepted to perform, however due to poor cognition and arousal pt would perform very minimal reps before self-terminating therex.Made several attempts to continue, attempting 10x with each exercise. Lateral leaning with persistant attempts to  lay back down.     Balance/Neuromuscular Re-Education  Balance/Neuromuscular Re-Education Activity Performed: Yes  Balance/Neuromuscular Re-Education Activity 2: Pt performed seated reaching activities EOB, ModA for trunk support due to L lateral/post leaning. Focus on challenging weight shifting anterior.. Pt demonstrates poor ability to perform task due to poor cognition and tolerance to activity.  Bed Mobility  Bed Mobility: Yes  Bed Mobility 1  Bed Mobility 1: Supine to sitting  Level of Assistance 1: Maximum assistance, Maximum verbal cues  Bed Mobility Comments 1: Pt performed supine<>EOB MaxA for lifting trunk with HOB elevated 50 degreesdue to posterior leaning/retropulsion present. Pt demonstrates ability to partially walk BLE off edge of bed.  Bed Mobility 2  Bed Mobility  2: Sitting to supine  Level of Assistance 2: Maximum assistance, Maximum verbal cues  Bed Mobility Comments 2: Pt performed EOB<>supine MaxA, unable to fully lift LEs onto bed and UEs to lower trunk.  Bed Mobility 3  Bed Mobility 3: Scooting  Level of Assistance 3: Dependent  Bed Mobility Comments 3: Pt is DEP for scooting one person assist to lift buttock off EOB and then scoot upwards.Max VC/TC for performing. Difficulty with following short,simple commands for sequencing.                Outcome Measures:     [facility] Basic Mobility  Turning from your back to your side while in a flat bed without using bedrails: A lot  Moving from lying on your back to sitting on the side of a flat bed without using bedrails: A lot  Moving to and from bed to chair (including a wheelchair): A lot  Standing up from a chair using your arms (e.g. wheelchair or bedside chair): A lot  To walk in hospital room: Total  Climbing 3-5 steps with railing: Total  Basic Mobility - Total Score: 10                                      Education Documentation  Body Mechanics, taught by Sushila Thomas PTA at 11/7/2024  2:32 PM.  Learner: Patient  Readiness:  Acceptance  Method: Explanation, Demonstration  Response: Verbalizes Understanding, Needs Reinforcement    Home Exercise Program, taught by Sushila Thomas PTA at 11/7/2024  2:32 PM.  Learner: Patient  Readiness: Acceptance  Method: Explanation, Demonstration  Response: Verbalizes Understanding, Needs Reinforcement    Mobility Training, taught by Sushila Thomas PTA at 11/7/2024  2:32 PM.  Learner: Patient  Readiness: Acceptance  Method: Explanation, Demonstration  Response: Verbalizes Understanding, Needs Reinforcement    Education Comments  No comments found.           EDUCATION:  Outpatient Education  Individual(s) Educated: Patient  Education Provided: Fall Risk  Encounter Problems       Encounter Problems (Active)       PT Problem       Pt will be able to perform all bed mobility tasks with Mod A.  (Progressing)       Start:  11/01/24    Expected End:  11/15/24            Pt will perform all transfers with Mod A and LRAD with proper safety mechanics.   (Not Progressing)       Start:  11/01/24    Expected End:  11/15/24            Pt will ambulate 30 ft with Mod A using LRAD for improved functional independence.  (Not Progressing)       Start:  11/01/24    Expected End:  11/15/24               Pain - Adult

## 2024-11-08 VITALS
WEIGHT: 140.87 LBS | BODY MASS INDEX: 22.11 KG/M2 | SYSTOLIC BLOOD PRESSURE: 93 MMHG | OXYGEN SATURATION: 97 % | HEIGHT: 67 IN | DIASTOLIC BLOOD PRESSURE: 61 MMHG | TEMPERATURE: 97.9 F | HEART RATE: 84 BPM | RESPIRATION RATE: 17 BRPM

## 2024-11-08 LAB
INR PPP: 2.1 (ref 0.9–1.1)
PROTHROMBIN TIME: 24.4 SECONDS (ref 9.8–12.8)

## 2024-11-08 PROCEDURE — 85610 PROTHROMBIN TIME: CPT | Performed by: STUDENT IN AN ORGANIZED HEALTH CARE EDUCATION/TRAINING PROGRAM

## 2024-11-08 PROCEDURE — 2500000002 HC RX 250 W HCPCS SELF ADMINISTERED DRUGS (ALT 637 FOR MEDICARE OP, ALT 636 FOR OP/ED): Performed by: INTERNAL MEDICINE

## 2024-11-08 PROCEDURE — 2500000001 HC RX 250 WO HCPCS SELF ADMINISTERED DRUGS (ALT 637 FOR MEDICARE OP): Performed by: STUDENT IN AN ORGANIZED HEALTH CARE EDUCATION/TRAINING PROGRAM

## 2024-11-08 PROCEDURE — 36415 COLL VENOUS BLD VENIPUNCTURE: CPT | Performed by: STUDENT IN AN ORGANIZED HEALTH CARE EDUCATION/TRAINING PROGRAM

## 2024-11-08 PROCEDURE — 2500000001 HC RX 250 WO HCPCS SELF ADMINISTERED DRUGS (ALT 637 FOR MEDICARE OP): Performed by: INTERNAL MEDICINE

## 2024-11-08 ASSESSMENT — PAIN SCALES - GENERAL: PAINLEVEL_OUTOF10: 0 - NO PAIN

## 2024-11-08 NOTE — CARE PLAN
The patient's goals for the shift include rest    The clinical goals for the shift include Pt will remain hemodynamically stable all shift

## 2024-11-08 NOTE — PROGRESS NOTES
[facility] did an on-site visit and are not accepting pt.  Spoke with pt's son, Herbert.  He confirmed that [facility].  [facility] is able to accept today.  Request to [facility] to send final dc paperwork and 7000, and arrange transport.  Transportation confirmed for 1:00pm; facility, bedside RN, [facility] and pt's son all aware.

## 2024-11-08 NOTE — CARE PLAN
The patient's goals for the shift include rest    The clinical goals for the shift include remain free from falls this shift      Problem: Nutrition  Goal: Less than 5 days NPO/clear liquids  Outcome: Progressing  Goal: Oral intake greater than 50%  Outcome: Progressing  Goal: Oral intake greater 75%  Outcome: Progressing  Goal: Consume prescribed supplement  Outcome: Progressing  Goal: Adequate PO fluid intake  Outcome: Progressing  Goal: Nutrition support goals are met within 48 hrs  Outcome: Progressing  Goal: Nutrition support is meeting 75% of nutrient needs  Outcome: Progressing  Goal: BG  mg/dL  Outcome: Progressing  Goal: Lab values WNL  Outcome: Progressing  Goal: Electrolytes WNL  Outcome: Progressing  Goal: Promote healing  Outcome: Progressing  Goal: Maintain stable weight  Outcome: Progressing  Goal: Reduce weight from edema/fluid  Outcome: Progressing  Goal: Gradual weight gain  Outcome: Progressing     Problem: Skin  Goal: Decreased wound size/increased tissue granulation at next dressing change  Outcome: Progressing  Flowsheets (Taken 11/8/2024 1349)  Decreased wound size/increased tissue granulation at next dressing change:   Promote sleep for wound healing   Protective dressings over bony prominences  Goal: Participates in plan/prevention/treatment measures  Outcome: Progressing  Flowsheets (Taken 11/8/2024 1349)  Participates in plan/prevention/treatment measures:   Discuss with provider PT/OT consult   Elevate heels   Increase activity/out of bed for meals  Goal: Prevent/manage excess moisture  Outcome: Progressing  Flowsheets (Taken 11/8/2024 1349)  Prevent/manage excess moisture:   Cleanse incontinence/protect with barrier cream   Moisturize dry skin   Follow provider orders for dressing changes   Monitor for/manage infection if present  Goal: Prevent/minimize sheer/friction injuries  Outcome: Progressing  Flowsheets (Taken 11/8/2024 1349)  Prevent/minimize sheer/friction injuries:    Complete micro-shifts as needed if patient unable. Adjust patient position to relieve pressure points, not a full turn   Increase activity/out of bed for meals   Use pull sheet   HOB 30 degrees or less   Turn/reposition every 2 hours/use positioning/transfer devices  Goal: Promote/optimize nutrition  Outcome: Progressing  Flowsheets (Taken 11/8/2024 1349)  Promote/optimize nutrition:   Assist with feeding   Monitor/record intake including meals   Consume > 50% meals/supplements   Offer water/supplements/favorite foods   Discuss with provider if NPO > 2 days   Reassess MST if dietician not consulted  Goal: Promote skin healing  Outcome: Progressing  Flowsheets (Taken 11/8/2024 1349)  Promote skin healing:   Assess skin/pad under line(s)/device(s)   Protective dressings over bony prominences   Turn/reposition every 2 hours/use positioning/transfer devices     Problem: Fall/Injury  Goal: Not fall by end of shift  Outcome: Progressing  Goal: Be free from injury by end of the shift  Outcome: Progressing  Goal: Verbalize understanding of personal risk factors for fall in the hospital  Outcome: Progressing  Goal: Verbalize understanding of risk factor reduction measures to prevent injury from fall in the home  Outcome: Progressing  Goal: Use assistive devices by end of the shift  Outcome: Progressing  Goal: Pace activities to prevent fatigue by end of the shift  Outcome: Progressing     Problem: Pain - Adult  Goal: Verbalizes/displays adequate comfort level or baseline comfort level  Outcome: Progressing  Flowsheets (Taken 11/8/2024 1349)  Verbalizes/displays adequate comfort level or baseline comfort level:   Encourage patient to monitor pain and request assistance   Assess pain using appropriate pain scale   Administer analgesics based on type and severity of pain and evaluate response   Implement non-pharmacological measures as appropriate and evaluate response   Consider cultural and social influences on pain and pain  management   Notify Licensed Independent Practitioner if interventions unsuccessful or patient reports new pain     Problem: Safety - Adult  Goal: Free from fall injury  Outcome: Progressing  Flowsheets (Taken 11/8/2024 1349)  Free from fall injury:   Instruct family/caregiver on patient safety   Based on caregiver fall risk screen, instruct family/caregiver to ask for assistance with transferring infant if caregiver noted to have fall risk factors     Problem: Discharge Planning  Goal: Discharge to home or other facility with appropriate resources  Outcome: Progressing  Flowsheets (Taken 11/8/2024 1348)  Discharge to home or other facility with appropriate resources:   Identify barriers to discharge with patient and caregiver   Arrange for needed discharge resources and transportation as appropriate   Identify discharge learning needs (meds, wound care, etc)   Arrange for interpreters to assist at discharge as needed   Refer to discharge planning if patient needs post-hospital services based on physician order or complex needs related to functional status, cognitive ability or social support system     Problem: Chronic Conditions and Co-morbidities  Goal: Patient's chronic conditions and co-morbidity symptoms are monitored and maintained or improved  Outcome: Progressing  Flowsheets (Taken 11/8/2024 1343)  Care Plan - Patient's Chronic Conditions and Co-Morbidity Symptoms are Monitored and Maintained or Improved:   Monitor and assess patient's chronic conditions and comorbid symptoms for stability, deterioration, or improvement   Collaborate with multidisciplinary team to address chronic and comorbid conditions and prevent exacerbation or deterioration   Update acute care plan with appropriate goals if chronic or comorbid symptoms are exacerbated and prevent overall improvement and discharge     Problem: Infection related to problem list condition  Goal: Infection will resolve through treatment  Outcome:  Progressing  Flowsheets (Taken 11/8/2024 3600)  Infection will resolve through treatment:   Observe for and document signs and symptoms of infection   Obtain labs/cultures as ordered by provider   Nurse will administer medications as ordered by provider   Administer treatments as ordered by provider

## 2024-11-09 ENCOUNTER — OFFICE VISIT (OUTPATIENT)
Dept: GERIATRIC MEDICINE | Age: 89
End: 2024-11-09
Payer: MEDICARE

## 2024-11-09 DIAGNOSIS — F03.918 DEMENTIA WITH BEHAVIORAL DISTURBANCE (HCC): Primary | ICD-10-CM

## 2024-11-09 DIAGNOSIS — I48.91 ATRIAL FIBRILLATION, UNSPECIFIED TYPE (HCC): ICD-10-CM

## 2024-11-09 DIAGNOSIS — R33.9 URINARY RETENTION: ICD-10-CM

## 2024-11-09 PROCEDURE — 99306 1ST NF CARE HIGH MDM 50: CPT | Performed by: INTERNAL MEDICINE

## 2024-11-09 PROCEDURE — G8484 FLU IMMUNIZE NO ADMIN: HCPCS | Performed by: INTERNAL MEDICINE

## 2024-11-09 PROCEDURE — 1123F ACP DISCUSS/DSCN MKR DOCD: CPT | Performed by: INTERNAL MEDICINE

## 2024-11-10 ENCOUNTER — HOSPITAL ENCOUNTER (EMERGENCY)
Facility: HOSPITAL | Age: 89
Discharge: HOME | End: 2024-11-11
Attending: STUDENT IN AN ORGANIZED HEALTH CARE EDUCATION/TRAINING PROGRAM
Payer: MEDICARE

## 2024-11-10 ENCOUNTER — APPOINTMENT (OUTPATIENT)
Dept: RADIOLOGY | Facility: HOSPITAL | Age: 89
End: 2024-11-10
Payer: MEDICARE
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
## 2024-11-10 DIAGNOSIS — S09.90XA HEAD INJURY, INITIAL ENCOUNTER: ICD-10-CM

## 2024-11-10 PROCEDURE — 72125 CT NECK SPINE W/O DYE: CPT | Performed by: SURGERY

## 2024-11-10 PROCEDURE — G0390 TRAUMA RESPONS W/HOSP CRITI: HCPCS

## 2024-11-10 PROCEDURE — 70450 CT HEAD/BRAIN W/O DYE: CPT

## 2024-11-10 PROCEDURE — 70450 CT HEAD/BRAIN W/O DYE: CPT | Performed by: SURGERY

## 2024-11-10 PROCEDURE — 72125 CT NECK SPINE W/O DYE: CPT

## 2024-11-10 PROCEDURE — 99285 EMERGENCY DEPT VISIT HI MDM: CPT | Mod: 25

## 2024-11-10 ASSESSMENT — LIFESTYLE VARIABLES
HAVE PEOPLE ANNOYED YOU BY CRITICIZING YOUR DRINKING: NO
EVER HAD A DRINK FIRST THING IN THE MORNING TO STEADY YOUR NERVES TO GET RID OF A HANGOVER: NO
EVER FELT BAD OR GUILTY ABOUT YOUR DRINKING: NO
HAVE YOU EVER FELT YOU SHOULD CUT DOWN ON YOUR DRINKING: NO
TOTAL SCORE: 0

## 2024-11-10 ASSESSMENT — PAIN - FUNCTIONAL ASSESSMENT: PAIN_FUNCTIONAL_ASSESSMENT: UNABLE TO SELF-REPORT

## 2024-11-10 ASSESSMENT — COLUMBIA-SUICIDE SEVERITY RATING SCALE - C-SSRS
2. HAVE YOU ACTUALLY HAD ANY THOUGHTS OF KILLING YOURSELF?: NO
1. IN THE PAST MONTH, HAVE YOU WISHED YOU WERE DEAD OR WISHED YOU COULD GO TO SLEEP AND NOT WAKE UP?: NO

## 2024-11-11 ENCOUNTER — OFFICE VISIT (OUTPATIENT)
Dept: GERIATRIC MEDICINE | Age: 89
End: 2024-11-11

## 2024-11-11 VITALS
BODY MASS INDEX: 21.48 KG/M2 | SYSTOLIC BLOOD PRESSURE: 100 MMHG | DIASTOLIC BLOOD PRESSURE: 61 MMHG | RESPIRATION RATE: 16 BRPM | OXYGEN SATURATION: 99 % | HEART RATE: 98 BPM | HEIGHT: 69 IN | WEIGHT: 145 LBS | TEMPERATURE: 96.7 F

## 2024-11-11 DIAGNOSIS — R33.9 URINARY RETENTION: ICD-10-CM

## 2024-11-11 DIAGNOSIS — F03.918 DEMENTIA WITH BEHAVIORAL DISTURBANCE (HCC): Primary | ICD-10-CM

## 2024-11-11 DIAGNOSIS — I48.91 ATRIAL FIBRILLATION, UNSPECIFIED TYPE (HCC): ICD-10-CM

## 2024-11-11 PROCEDURE — 99291 CRITICAL CARE FIRST HOUR: CPT | Performed by: STUDENT IN AN ORGANIZED HEALTH CARE EDUCATION/TRAINING PROGRAM

## 2024-11-11 ASSESSMENT — PAIN SCALES - GENERAL: PAINLEVEL_OUTOF10: 0 - NO PAIN

## 2024-11-11 NOTE — ED TRIAGE NOTES
Pt son called, per son, pt has been at facility since Friday, and has fallen everyday since. Son number is current in chart for further questions.

## 2024-11-11 NOTE — ED NOTES
Patient to ct at 2140 with monitor and rn. Patient returned at this time.      Annette Hunter RN  11/11/24 0037

## 2024-11-11 NOTE — ED PROVIDER NOTES
EMERGENCY DEPARTMENT ENCOUNTER      Pt Name: Slim Saldivar  MRN: [id number]  Birthdate 1935  Date of evaluation: 11/10/2024  Provider: Marty Mendiola DO    CHIEF COMPLAINT       Chief Complaint   Patient presents with    Fall         HISTORY OF PRESENT ILLNESS    Patient is an 89-year-old male brought in today via EMS with concern for fall with head injury and EtOH.  Patient is on warfarin.  After discussion with the patient's facility, they state that he was getting out of his chair when he had a fall.  It was mechanical in nature.  He did not pass out or lose consciousness.  No syncopal episode prior to the fall.  Patient is not complaining of any pain anywhere.  No chest pain, shortness of breath, abdominal pain, nausea, vomiting, dizziness, lightheadedness, vision changes.        PMH: past medical history significant for A fib on warfarin, HTN. HLD     Nursing Notes were reviewed.    PAST MEDICAL HISTORY     Past Medical History:   Diagnosis Date    Anemia     Chronic kidney disease     Varicella          SURGICAL HISTORY       Past Surgical History:   Procedure Laterality Date    CIRCUMCISION, PRIMARY      OTHER SURGICAL HISTORY  01/20/2022    Tonsillectomy    TONSILLECTOMY      VASECTOMY      WISDOM TOOTH EXTRACTION           CURRENT MEDICATIONS       Discharge Medication List as of 11/11/2024 12:41 AM        CONTINUE these medications which have NOT CHANGED    Details   acetaminophen (Tylenol) 325 mg tablet Take 2 tablets (650 mg) by mouth every 4 hours if needed for mild pain (1 - 3)., Starting Tue 7/16/2024, Historical Med      allopurinol (Zyloprim) 100 mg tablet TAKE 1 TABLET DAILY, Starting Tue 11/28/2023, Normal      Anti-DiarrheaL, loperamide, 2 mg tablet Take 1 tablet (2 mg) by mouth 3 times a day as needed for diarrhea., Starting Tue 7/16/2024, Historical Med      cholecalciferol, vitamin D3, 50 mcg (2,000 unit) tablet,chewable Chew 2 tablets once daily., Starting Tue 8/27/2024, Historical Med       divalproex sprinkle (Depakote Sprinkle) 125 mg DR capsule Take 2 capsules (250 mg) by mouth 2 times a day., Starting Thu 10/10/2024, Historical Med      LORazepam (Ativan) 0.5 mg tablet Take 0.5 tablets (0.25 mg) by mouth 2 times a day., Starting Tue 9/3/2024, Historical Med      metoprolol succinate XL (Toprol-XL) 50 mg 24 hr tablet Take 1 tablet (50 mg) by mouth once daily., Historical Med      Milk of Magnesia 400 mg/5 mL suspension Take 30 mL by mouth once daily as needed for constipation., Starting Tue 7/16/2024, Historical Med      tamsulosin (Flomax) 0.4 mg 24 hr capsule Take 1 capsule (0.4 mg) by mouth once daily., Starting Wed 11/6/2024, Until Fri 12/6/2024, No Print      venlafaxine (Effexor) 50 mg tablet Take 1 tablet (50 mg) by mouth once daily., Starting Tue 10/15/2024, Historical Med      warfarin (Coumadin) 3 mg tablet Take 2.5 tablets (7.5 mg) by mouth once daily at bedtime. Take as directed per After Visit Summary.  Adjust as needed to maintain INR 2-3, Starting Wed 11/6/2024, No Print             ALLERGIES     Patient has no known allergies.    FAMILY HISTORY       Family History   Problem Relation Name Age of Onset    Hypertension Mother Vidaamilcar Saldivar     Stroke Mother Vida Saldivar     Heart disease Father [facility]     Cancer Sister Ashley Brownlee           SOCIAL HISTORY       Social History     Socioeconomic History    Marital status: Single   Tobacco Use    Smoking status: Former     Current packs/day: 1.00     Types: Cigarettes     Passive exposure: Past    Smokeless tobacco: Never   Vaping Use    Vaping status: Never Used   Substance and Sexual Activity    Alcohol use: Not Currently    Drug use: Never    Sexual activity: Not Currently     Partners: Female     Social Drivers of Health     Financial Resource Strain: Low Risk  (10/29/2024)    Overall Financial Resource Strain (CARDIA)     Difficulty of Paying Living Expenses: Not hard at all   Food Insecurity: Patient Unable To Answer (10/29/2024)     Hunger Vital Sign     Worried About Running Out of Food in the Last Year: Patient unable to answer     Ran Out of Food in the Last Year: Patient unable to answer   Transportation Needs: No Transportation Needs (10/29/2024)    PRAPARE - Transportation     Lack of Transportation (Medical): No     Lack of Transportation (Non-Medical): No   Physical Activity: Inactive (10/29/2024)    Exercise Vital Sign     Days of Exercise per Week: 0 days     Minutes of Exercise per Session: 0 min   Stress: Patient Unable To Answer (10/29/2024)    [employer] [place] [employer] - Occupational Stress Questionnaire     Feeling of Stress : Patient unable to answer   Social Connections: Patient Unable To Answer (10/29/2024)    Social Connection and Isolation Panel [NHANES]     Frequency of Communication with Friends and Family: Patient unable to answer     Frequency of Social Gatherings with Friends and Family: Patient unable to answer     Attends Rastafarian Services: Patient unable to answer     Active Member of Clubs or Organizations: Patient unable to answer     Attends Club or Organization Meetings: Patient unable to answer     Marital Status: Patient unable to answer   Intimate Partner Violence: Patient Unable To Answer (10/29/2024)    Humiliation, Afraid, Rape, and Kick questionnaire     Fear of Current or Ex-Partner: Patient unable to answer     Emotionally Abused: Patient unable to answer     Physically Abused: Patient unable to answer     Sexually Abused: Patient unable to answer   Housing Stability: Low Risk  (10/29/2024)    Housing Stability Vital Sign     Unable to Pay for Housing in the Last Year: No     Number of Times Moved in the Last Year: 0     Homeless in the Last Year: No       SCREENINGS                        PHYSICAL EXAM    (up to 7 for level 4, 8 or more for level 5)     ED Triage Vitals   Temp Pulse Resp BP   -- -- -- --      SpO2 Temp src Heart Rate Source Patient Position   -- -- -- --      BP  Location FiO2 (%)     -- --       Trauma assessment:    Airway: Intact  Breathing: Breath sounds b discontinued [place] the 1 that is like something like some slight thing suicidal anhedonia the back. No obvious deformities. No step-off lesions.  Head: No hemotympanum bilaterally.  No raccoon eyes or vaughn sign.  Midface is stable.  Extremities: Gross motor and sensation intact bilaterally.    Physical Exam  Vitals and nursing note reviewed.   Constitutional:       General: He is not in acute distress.     Appearance: Normal appearance. He is not ill-appearing or toxic-appearing.   HENT:      Head: Normocephalic and atraumatic.      Right Ear: Tympanic membrane, ear canal and external ear normal.      Left Ear: Tympanic membrane, ear canal and external ear normal.      Nose: Nose normal.   Eyes:      General:         Right eye: No discharge.         Left eye: No discharge.      Extraocular Movements: Extraocular movements intact.      Conjunctiva/sclera: Conjunctivae normal.      Pupils: Pupils are equal, round, and reactive to light.   Cardiovascular:      Rate and Rhythm: Normal rate and regular rhythm.      Pulses: Normal pulses.      Heart sounds: Normal heart sounds.   Pulmonary:      Effort: Pulmonary effort is normal.      Breath sounds: Normal breath sounds.   Abdominal:      General: There is no distension.      Palpations: Abdomen is soft. There is no mass.      Tenderness: There is no abdominal tenderness. There is no guarding.   Musculoskeletal:         General: No deformity or signs of injury.   Skin:     General: Skin is warm and dry.   Neurological:      General: No focal deficit present.      Mental Status: He is alert and oriented to person, place, and time. Mental status is at baseline.   Psychiatric:         Mood and Affect: Mood normal.         Behavior: Behavior normal.          DIAGNOSTIC RESULTS     LABS:  Labs Reviewed - No data to display    All other labs were within normal range or not  returned as of this dictation.    Imaging  CT head wo IV contrast   Final Result   No evidence of acute intracranial abnormality.        No acute cervical spine fracture or malalignment.             MACRO:   None        Signed by: Chris Gaytan 11/10/2024 10:52 PM   Dictation workstation:   [id number]      CT cervical spine wo IV contrast   Final Result   No evidence of acute intracranial abnormality.        No acute cervical spine fracture or malalignment.             MACRO:   None        Signed by: Chris Gaytan 11/10/2024 10:52 PM   Dictation workstation:   [id number]           Procedures  Procedures     EMERGENCY DEPARTMENT COURSE/MDM:   Medical Decision Making  89-year-old male brought in tonight via EMS with concern for head injury and anticoagulation.  Limited trauma called.  Primary survey intact.  Secondary survey overall unremarkable and unrevealing.  No obvious injuries.  No hemotympanum bilaterally.  No Ellis sign.  No signs of basilar skull fracture.  No midline spinous process tenderness.  No tenderness to palpation about the bilateral upper/lower extremities.  Given anticoagulation use, as well as his age, will obtain CT of the head and CT of C-spine to evaluate for any acute injuries.  If these are unremarkable patient like to be able to discharge back to his facility.        Please see ED course for additional MDM.    ED Course as of 11/11/24 0214 Mon Nov 11, 2024 0213 CT of the head and CT C-spine negative for acute injuries.  Patient stable for discharge at this time.  Discharged in stable condition.  Return precautions discussed. [CL]      ED Course User Index  [CL] Marty Mendiola DO         Diagnoses as of 11/11/24 0214   Fall, initial encounter   Head injury, initial encounter        CT head wo IV contrast   Final Result   No evidence of acute intracranial abnormality.        No acute cervical spine fracture or malalignment.             MACRO:   None        Signed by: Chris Gaytan  11/10/2024 10:52 PM   Dictation workstation:   [id number]      CT cervical spine wo IV contrast   Final Result   No evidence of acute intracranial abnormality.        No acute cervical spine fracture or malalignment.             MACRO:   None        Signed by: Chris Gaytan 11/10/2024 10:52 PM   Dictation workstation:   [id number]          Patient and or family in agreement and understanding of treatment plan.  All questions answered.      I reviewed the case with the attending ED physician. The attending ED physician agrees with the plan. Patient and/or patient´s representative was counseled regarding labs, imaging, likely diagnosis, and plan. All questions were answered.    ED Medications administered this visit:  Medications - No data to display    New Prescriptions from this visit:    Discharge Medication List as of 11/11/2024 12:41 AM          Follow-up:  Flora Rivera DO  [address] [place] [id number]  [phone]    Schedule an appointment as soon as possible for a visit           Final Impression:   1. Fall, initial encounter    2. Head injury, initial encounter          (Please note that portions of this note were completed with a voice recognition program.  Efforts were made to edit the dictations but occasionally words are mis-transcribed.)     Marty Mendiola DO  Resident  11/11/24 0214       Haider Carroll MD  11/13/24 [id number]

## 2024-11-11 NOTE — DISCHARGE INSTRUCTIONS
No acute injuries were found on your workup today.    Seek immediate medical attention if you develop: worsening headache, nausea, vomiting, confusion, weakness, loss of motion in your arms or legs, loss of control of your urine or stool, difficulty waking from sleep, neck pain, fever, or any new or worsening symptoms.

## 2024-11-11 NOTE — ED NOTES
Patient continues to pull wires and bp off and throw them on the floor. Patient will allow you to put them back on but then takes them off within 5 min.      Annette Hunter RN  11/11/24 0039

## 2024-11-13 NOTE — ED PROCEDURE NOTE
Procedure  Critical Care    Performed by: Haider Carroll MD  Authorized by: Haider Carroll MD    Critical care provider statement:     Critical care time (minutes):  31    Critical care was time spent personally by me on the following activities:  Ordering and review of radiographic studies, review of old charts, re-evaluation of patient's condition, examination of patient, development of treatment plan with patient or surrogate and obtaining history from patient or surrogate  Comments:      Patient arrived with concern for fall in the setting of anticoagulation/warfarin use.  Concern for head trauma.  Received expedited evaluation by the ED team due to concern for head trauma on anticoagulation.  His history was limited due to lack of caregiver available at bedside and history of dementia.  He received expedited CT head and CT Cspine to evaluate for intracranial bleeding and C-spine fractures Given his age and history of anticoagulation               Haider Carroll MD  11/13/24 0698

## 2024-11-15 ENCOUNTER — OFFICE VISIT (OUTPATIENT)
Dept: GERIATRIC MEDICINE | Age: 88
End: 2024-11-15
Payer: MEDICARE

## 2024-11-15 DIAGNOSIS — F03.918 DEMENTIA WITH BEHAVIORAL DISTURBANCE (HCC): Primary | ICD-10-CM

## 2024-11-15 DIAGNOSIS — R33.9 URINARY RETENTION: ICD-10-CM

## 2024-11-15 DIAGNOSIS — I48.91 ATRIAL FIBRILLATION, UNSPECIFIED TYPE (HCC): ICD-10-CM

## 2024-11-15 PROCEDURE — 1123F ACP DISCUSS/DSCN MKR DOCD: CPT | Performed by: INTERNAL MEDICINE

## 2024-11-15 PROCEDURE — 99308 SBSQ NF CARE LOW MDM 20: CPT | Performed by: INTERNAL MEDICINE

## 2024-11-15 PROCEDURE — G8484 FLU IMMUNIZE NO ADMIN: HCPCS | Performed by: INTERNAL MEDICINE

## 2024-11-19 RX ORDER — ALLOPURINOL 100 MG/1
100 TABLET ORAL DAILY
COMMUNITY

## 2024-11-19 RX ORDER — METOPROLOL SUCCINATE 50 MG/1
50 TABLET, EXTENDED RELEASE ORAL DAILY
COMMUNITY

## 2024-11-19 RX ORDER — ACETAMINOPHEN 500 MG
500 TABLET ORAL EVERY 4 HOURS PRN
COMMUNITY

## 2024-11-19 RX ORDER — VENLAFAXINE 50 MG/1
50 TABLET ORAL DAILY
COMMUNITY

## 2024-11-19 RX ORDER — LORAZEPAM 0.5 MG/1
0.5 TABLET ORAL 2 TIMES DAILY
COMMUNITY

## 2024-11-19 RX ORDER — LOPERAMIDE HYDROCHLORIDE 2 MG/1
2 CAPSULE ORAL 4 TIMES DAILY PRN
COMMUNITY

## 2024-11-19 RX ORDER — ACETAMINOPHEN 160 MG
4000 TABLET,DISINTEGRATING ORAL DAILY
COMMUNITY

## 2024-11-19 RX ORDER — DIVALPROEX SODIUM 125 MG/1
250 CAPSULE, COATED PELLETS ORAL 2 TIMES DAILY
COMMUNITY

## 2024-11-19 RX ORDER — WARFARIN SODIUM 5 MG/1
7.5 TABLET ORAL DAILY
COMMUNITY

## 2024-11-19 RX ORDER — TAMSULOSIN HYDROCHLORIDE 0.4 MG/1
0.4 CAPSULE ORAL DAILY
COMMUNITY

## 2024-11-19 NOTE — PROGRESS NOTES
recognition software. Every effort was made to ensure accuracy; however, inadvertent computerized transcription errors may be present.

## 2024-11-20 NOTE — PROGRESS NOTES
SNF PROGRESS NOTE      Cc- GAYLE       Patient is a Jim Dhillon 89 y.o. male who is being seen at [facility] in the dementia unit. The patient was hospitalized for GAYLE. The GAYLE was felt to be from retention. A clarke was placed and his renal function recovered.     Patient had a fall the night before. He was seen in the ED and returned. He is eating well. He denies any pain.         Past Medical History:   Diagnosis Date    Atrial fibrillation (HCC)     BPH (benign prostatic hyperplasia)     Chronic kidney disease     Dementia (HCC)     Hyperlipidemia      Patient has no allergy information on record.    VS reviewed    Gen- Alert and oriented x 1   Heart- RRR no murmur no LE edema   Lungs- CTA b/l no resp distress RA oxygen   Abd- bs x 4     + clarke       Assessment and Plan    Dementia   Ativan   Psych   Depakote   Effexor   A fib   Coumadin-- monitor INR   Metoprolol   HLD   Urinary retention   F/u urology 12/23  Flomax   Clarke     Significant Events     11/10/24- Fall and went to the ER       YAIR Jeronimo DO     Electronically signed by: Pearl Camacho DO on 11/11/2024

## 2024-11-25 NOTE — PROGRESS NOTES
SNF PROGRESS NOTE      Cc- dementia       Patient is a Jim Dhillon 89 y.o. male who is being seen on the dementia unit for dementia. He is being seen for his 30 day exam.     Over the last 30 days, the patient was noted to be admitted to the hospital. He did have several falls, one which required an ED visit.         Past Medical History:   Diagnosis Date    Atrial fibrillation (HCC)     BPH (benign prostatic hyperplasia)     Chronic kidney disease     Dementia (HCC)     Hyperlipidemia      Patient has no allergy information on record.    VS reviewed      Gen- Alert and oriented x 1   Heart- RRR no murmur no LE edema   Lungs- CTA b/l no resp distress RA oxygen   Abd- bs x 4      + clarke         Assessment and Plan    Dementia   Ativan   Psych   Depakote   Effexor   A fib   Coumadin-- monitor INR   Metoprolol   HLD   Urinary retention   F/u urology 12/23  Flomax   Clarke      Significant Events      11/10/24- Fall and went to the ER       YAIR Jeronimo DO     Electronically signed by: Pearl Camacho DO on 11/15/2024

## 2024-11-26 ENCOUNTER — APPOINTMENT (OUTPATIENT)
Dept: RADIOLOGY | Facility: HOSPITAL | Age: 89
End: 2024-11-26
Payer: MEDICARE

## 2024-11-26 ENCOUNTER — HOSPITAL ENCOUNTER (EMERGENCY)
Facility: HOSPITAL | Age: 89
Discharge: HOME | End: 2024-11-27
Attending: STUDENT IN AN ORGANIZED HEALTH CARE EDUCATION/TRAINING PROGRAM
Payer: MEDICARE

## 2024-11-26 ENCOUNTER — APPOINTMENT (OUTPATIENT)
Dept: CARDIOLOGY | Facility: HOSPITAL | Age: 89
End: 2024-11-26
Payer: MEDICARE

## 2024-11-26 VITALS
HEIGHT: 69 IN | RESPIRATION RATE: 18 BRPM | WEIGHT: 145 LBS | HEART RATE: 100 BPM | SYSTOLIC BLOOD PRESSURE: 120 MMHG | OXYGEN SATURATION: 98 % | DIASTOLIC BLOOD PRESSURE: 64 MMHG | TEMPERATURE: 97.9 F | BODY MASS INDEX: 21.48 KG/M2
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
## 2024-11-26 DIAGNOSIS — I48.91 ATRIAL FIBRILLATION WITH RAPID VENTRICULAR RESPONSE (MULTI): ICD-10-CM

## 2024-11-26 DIAGNOSIS — R79.1 SUPRATHERAPEUTIC INR: ICD-10-CM

## 2024-11-26 LAB
ALBUMIN SERPL BCP-MCNC: 3.2 G/DL (ref 3.4–5)
ALP SERPL-CCNC: 109 U/L (ref 33–136)
ALT SERPL W P-5'-P-CCNC: 20 U/L (ref 10–52)
ANION GAP SERPL CALC-SCNC: 14 MMOL/L (ref 10–20)
AST SERPL W P-5'-P-CCNC: 22 U/L (ref 9–39)
BASOPHILS # BLD AUTO: 0.04 X10*3/UL (ref 0–0.1)
BASOPHILS NFR BLD AUTO: 0.4 %
BILIRUB SERPL-MCNC: 0.8 MG/DL (ref 0–1.2)
BUN SERPL-MCNC: 30 MG/DL (ref 6–23)
CALCIUM SERPL-MCNC: 8.8 MG/DL (ref 8.6–10.3)
CARDIAC TROPONIN I PNL SERPL HS: 19 NG/L (ref 0–20)
CHLORIDE SERPL-SCNC: 104 MMOL/L (ref 98–107)
CO2 SERPL-SCNC: 27 MMOL/L (ref 21–32)
CREAT SERPL-MCNC: 1.3 MG/DL (ref 0.5–1.3)
EGFRCR SERPLBLD CKD-EPI 2021: 53 ML/MIN/1.73M*2
EOSINOPHIL # BLD AUTO: 0.15 X10*3/UL (ref 0–0.4)
EOSINOPHIL NFR BLD AUTO: 1.6 %
ERYTHROCYTE [DISTWIDTH] IN BLOOD BY AUTOMATED COUNT: 16.1 % (ref 11.5–14.5)
GLUCOSE SERPL-MCNC: 138 MG/DL (ref 74–99)
HCT VFR BLD AUTO: 30.4 % (ref 41–52)
HGB BLD-MCNC: 9.5 G/DL (ref 13.5–17.5)
IMM GRANULOCYTES # BLD AUTO: 0.05 X10*3/UL (ref 0–0.5)
IMM GRANULOCYTES NFR BLD AUTO: 0.5 % (ref 0–0.9)
INR PPP: 4.3 (ref 0.9–1.1)
LYMPHOCYTES # BLD AUTO: 1.4 X10*3/UL (ref 0.8–3)
LYMPHOCYTES NFR BLD AUTO: 14.6 %
MAGNESIUM SERPL-MCNC: 1.84 MG/DL (ref 1.6–2.4)
MCH RBC QN AUTO: 30.7 PG (ref 26–34)
MCHC RBC AUTO-ENTMCNC: 31.3 G/DL (ref 32–36)
MCV RBC AUTO: 98 FL (ref 80–100)
MONOCYTES # BLD AUTO: 0.87 X10*3/UL (ref 0.05–0.8)
MONOCYTES NFR BLD AUTO: 9.1 %
NEUTROPHILS # BLD AUTO: 7.1 X10*3/UL (ref 1.6–5.5)
NEUTROPHILS NFR BLD AUTO: 73.8 %
NRBC BLD-RTO: 0 /100 WBCS (ref 0–0)
PLATELET # BLD AUTO: 338 X10*3/UL (ref 150–450)
POTASSIUM SERPL-SCNC: 4.2 MMOL/L (ref 3.5–5.3)
PROT SERPL-MCNC: 5.9 G/DL (ref 6.4–8.2)
PROTHROMBIN TIME: 49.2 SECONDS (ref 9.8–12.8)
RBC # BLD AUTO: 3.09 X10*6/UL (ref 4.5–5.9)
SODIUM SERPL-SCNC: 141 MMOL/L (ref 136–145)
WBC # BLD AUTO: 9.6 X10*3/UL (ref 4.4–11.3)

## 2024-11-26 PROCEDURE — 72125 CT NECK SPINE W/O DYE: CPT

## 2024-11-26 PROCEDURE — 70450 CT HEAD/BRAIN W/O DYE: CPT | Performed by: RADIOLOGY

## 2024-11-26 PROCEDURE — 71045 X-RAY EXAM CHEST 1 VIEW: CPT

## 2024-11-26 PROCEDURE — 2500000004 HC RX 250 GENERAL PHARMACY W/ HCPCS (ALT 636 FOR OP/ED): Performed by: STUDENT IN AN ORGANIZED HEALTH CARE EDUCATION/TRAINING PROGRAM

## 2024-11-26 PROCEDURE — 80053 COMPREHEN METABOLIC PANEL: CPT

## 2024-11-26 PROCEDURE — 71045 X-RAY EXAM CHEST 1 VIEW: CPT | Performed by: RADIOLOGY

## 2024-11-26 PROCEDURE — 36415 COLL VENOUS BLD VENIPUNCTURE: CPT

## 2024-11-26 PROCEDURE — 96374 THER/PROPH/DIAG INJ IV PUSH: CPT | Mod: 59 | Performed by: STUDENT IN AN ORGANIZED HEALTH CARE EDUCATION/TRAINING PROGRAM

## 2024-11-26 PROCEDURE — 96361 HYDRATE IV INFUSION ADD-ON: CPT | Performed by: STUDENT IN AN ORGANIZED HEALTH CARE EDUCATION/TRAINING PROGRAM

## 2024-11-26 PROCEDURE — 84484 ASSAY OF TROPONIN QUANT: CPT

## 2024-11-26 PROCEDURE — 72125 CT NECK SPINE W/O DYE: CPT | Performed by: RADIOLOGY

## 2024-11-26 PROCEDURE — 70450 CT HEAD/BRAIN W/O DYE: CPT

## 2024-11-26 PROCEDURE — 85025 COMPLETE CBC W/AUTO DIFF WBC: CPT

## 2024-11-26 PROCEDURE — 90471 IMMUNIZATION ADMIN: CPT

## 2024-11-26 PROCEDURE — 99291 CRITICAL CARE FIRST HOUR: CPT | Mod: 25

## 2024-11-26 PROCEDURE — 2500000001 HC RX 250 WO HCPCS SELF ADMINISTERED DRUGS (ALT 637 FOR MEDICARE OP)

## 2024-11-26 PROCEDURE — 96375 TX/PRO/DX INJ NEW DRUG ADDON: CPT | Performed by: STUDENT IN AN ORGANIZED HEALTH CARE EDUCATION/TRAINING PROGRAM

## 2024-11-26 PROCEDURE — G0390 TRAUMA RESPONS W/HOSP CRITI: HCPCS

## 2024-11-26 PROCEDURE — 83735 ASSAY OF MAGNESIUM: CPT

## 2024-11-26 PROCEDURE — 93005 ELECTROCARDIOGRAM TRACING: CPT

## 2024-11-26 PROCEDURE — 90715 TDAP VACCINE 7 YRS/> IM: CPT

## 2024-11-26 PROCEDURE — 96365 THER/PROPH/DIAG IV INF INIT: CPT | Performed by: STUDENT IN AN ORGANIZED HEALTH CARE EDUCATION/TRAINING PROGRAM

## 2024-11-26 PROCEDURE — 2500000004 HC RX 250 GENERAL PHARMACY W/ HCPCS (ALT 636 FOR OP/ED)

## 2024-11-26 PROCEDURE — 85610 PROTHROMBIN TIME: CPT

## 2024-11-26 PROCEDURE — 99285 EMERGENCY DEPT VISIT HI MDM: CPT | Mod: 25 | Performed by: STUDENT IN AN ORGANIZED HEALTH CARE EDUCATION/TRAINING PROGRAM

## 2024-11-26 RX ORDER — ACETAMINOPHEN 650 MG/1
650 SUPPOSITORY RECTAL EVERY 4 HOURS PRN
COMMUNITY

## 2024-11-26 RX ORDER — WARFARIN 4 MG/1
4 TABLET ORAL NIGHTLY
COMMUNITY

## 2024-11-26 RX ORDER — METOPROLOL TARTRATE 1 MG/ML
5 INJECTION, SOLUTION INTRAVENOUS
Status: DISPENSED | OUTPATIENT
Start: 2024-11-26 | End: 2024-11-26

## 2024-11-26 RX ORDER — FENTANYL CITRATE 50 UG/ML
50 INJECTION, SOLUTION INTRAMUSCULAR; INTRAVENOUS ONCE
Status: COMPLETED | OUTPATIENT
Start: 2024-11-26 | End: 2024-11-26

## 2024-11-26 RX ORDER — MAGNESIUM HYDROXIDE 1200 MG/15ML
1 LIQUID ORAL DAILY PRN
COMMUNITY

## 2024-11-26 RX ORDER — METOPROLOL SUCCINATE 25 MG/1
50 TABLET, EXTENDED RELEASE ORAL ONCE
Status: COMPLETED | OUTPATIENT
Start: 2024-11-26 | End: 2024-11-26

## 2024-11-26 RX ORDER — ACETAMINOPHEN 500 MG
500 TABLET ORAL EVERY 6 HOURS PRN
COMMUNITY

## 2024-11-26 RX ORDER — ALUMINUM HYDROXIDE, MAGNESIUM HYDROXIDE, AND SIMETHICONE 1200; 120; 1200 MG/30ML; MG/30ML; MG/30ML
30 SUSPENSION ORAL EVERY 4 HOURS PRN
COMMUNITY

## 2024-11-26 RX ORDER — MAGNESIUM SULFATE HEPTAHYDRATE 40 MG/ML
2 INJECTION, SOLUTION INTRAVENOUS ONCE
Status: COMPLETED | OUTPATIENT
Start: 2024-11-26 | End: 2024-11-26

## 2024-11-26 ASSESSMENT — LIFESTYLE VARIABLES
TOTAL SCORE: 0
HAVE YOU EVER FELT YOU SHOULD CUT DOWN ON YOUR DRINKING: NO
EVER FELT BAD OR GUILTY ABOUT YOUR DRINKING: NO
EVER HAD A DRINK FIRST THING IN THE MORNING TO STEADY YOUR NERVES TO GET RID OF A HANGOVER: NO
HAVE PEOPLE ANNOYED YOU BY CRITICIZING YOUR DRINKING: NO

## 2024-11-26 ASSESSMENT — PAIN SCALES - WONG BAKER: WONGBAKER_NUMERICALRESPONSE: HURTS LITTLE BIT

## 2024-11-26 ASSESSMENT — PAIN DESCRIPTION - LOCATION: LOCATION: BACK

## 2024-11-26 ASSESSMENT — PAIN - FUNCTIONAL ASSESSMENT: PAIN_FUNCTIONAL_ASSESSMENT: WONG-BAKER FACES

## 2024-11-26 NOTE — DISCHARGE INSTRUCTIONS
Seek immediate medical attention if you develop: new or worsening headache, nausea, vomiting, confusion, weakness, loss of motion in your arms or legs, loss of control of your urine or stool, difficulty waking from sleep, or any new or worsening symptoms.    Hold Coumadin for a week, repeat INR in 1 week and follow-up with either doctor at the facility or Coumadin clinic

## 2024-11-26 NOTE — ED PROVIDER NOTES
EMERGENCY DEPARTMENT ENCOUNTER      Pt Name: Slim Saldivar  MRN: [id number]  Birthdate 1935  Date of evaluation: 11/26/2024  Provider: Keon Harrison DO    CHIEF COMPLAINT       Chief Complaint   Patient presents with    Fall     Fall from wheelchair. On Warfarin. Did hit head, no LOC          HISTORY OF PRESENT ILLNESS    Is an 81-year-old male chemistry dementia, alert to self at baseline, A-fib on Coumadin, hypertension, comes emergency room today.  He was at his facility, was in a wheelchair, fell out of his wheelchair.  Patient does not historian for himself.  His tetanus is not up-to-date per chart review.  Does have a small abrasion on his head, no reported LOC.      History provided by:  Patient      Nursing Notes were reviewed.    PAST MEDICAL HISTORY     Past Medical History:   Diagnosis Date    Anemia     Chronic kidney disease     Varicella          SURGICAL HISTORY       Past Surgical History:   Procedure Laterality Date    CIRCUMCISION, PRIMARY      OTHER SURGICAL HISTORY  01/20/2022    Tonsillectomy    TONSILLECTOMY      VASECTOMY      WISDOM TOOTH EXTRACTION           CURRENT MEDICATIONS       Previous Medications    ACETAMINOPHEN (TYLENOL) 325 MG TABLET    Take 2 tablets (650 mg) by mouth every 4 hours if needed for mild pain (1 - 3).    ALLOPURINOL (ZYLOPRIM) 100 MG TABLET    TAKE 1 TABLET DAILY    ANTI-DIARRHEAL, LOPERAMIDE, 2 MG TABLET    Take 1 tablet (2 mg) by mouth 3 times a day as needed for diarrhea.    CHOLECALCIFEROL, VITAMIN D3, 50 MCG (2,000 UNIT) TABLET,CHEWABLE    Chew 2 tablets once daily.    DIVALPROEX SPRINKLE (DEPAKOTE SPRINKLE) 125 MG DR CAPSULE    Take 2 capsules (250 mg) by mouth 2 times a day.    LORAZEPAM (ATIVAN) 0.5 MG TABLET    Take 0.5 tablets (0.25 mg) by mouth 2 times a day.    METOPROLOL SUCCINATE XL (TOPROL-XL) 50 MG 24 HR TABLET    Take 1 tablet (50 mg) by mouth once daily.    MILK OF MAGNESIA 400 MG/5 ML SUSPENSION    Take 30 mL by mouth once daily as needed for  constipation.    TAMSULOSIN (FLOMAX) 0.4 MG 24 HR CAPSULE    Take 1 capsule (0.4 mg) by mouth once daily.    VENLAFAXINE (EFFEXOR) 50 MG TABLET    Take 1 tablet (50 mg) by mouth once daily.    WARFARIN (COUMADIN) 3 MG TABLET    Take 2.5 tablets (7.5 mg) by mouth once daily at bedtime. Take as directed per After Visit Summary.  Adjust as needed to maintain INR 2-3       ALLERGIES     Patient has no known allergies.    FAMILY HISTORY       Family History   Problem Relation Name Age of Onset    Hypertension Mother Vida Saldivar     Stroke Mother Vida Saldivar     Heart disease Father Bird Saldivar     Cancer Sister Ashley Brownlee           SOCIAL HISTORY       Social History     Socioeconomic History    Marital status: Single   Tobacco Use    Smoking status: Former     Current packs/day: 1.00     Types: Cigarettes     Passive exposure: Past    Smokeless tobacco: Never   Vaping Use    Vaping status: Never Used   Substance and Sexual Activity    Alcohol use: Not Currently    Drug use: Never    Sexual activity: Not Currently     Partners: Female     Social Drivers of Health     Financial Resource Strain: Low Risk  (10/29/2024)    Overall Financial Resource Strain (CARDIA)     Difficulty of Paying Living Expenses: Not hard at all   Food Insecurity: Patient Unable To Answer (10/29/2024)    Hunger Vital Sign     Worried About Running Out of Food in the Last Year: Patient unable to answer     Ran Out of Food in the Last Year: Patient unable to answer   Transportation Needs: No Transportation Needs (10/29/2024)    PRAPARE - Transportation     Lack of Transportation (Medical): No     Lack of Transportation (Non-Medical): No   Physical Activity: Inactive (10/29/2024)    Exercise Vital Sign     Days of Exercise per Week: 0 days     Minutes of Exercise per Session: 0 min   Stress: Patient Unable To Answer (10/29/2024)    [employer] [place] [employer] - Occupational Stress Questionnaire     Feeling of Stress : Patient unable to  answer   Social Connections: Patient Unable To Answer (10/29/2024)    Social Connection and Isolation Panel [NHANES]     Frequency of Communication with Friends and Family: Patient unable to answer     Frequency of Social Gatherings with Friends and Family: Patient unable to answer     Attends Religion Services: Patient unable to answer     Active Member of Clubs or Organizations: Patient unable to answer     Attends Club or Organization Meetings: Patient unable to answer     Marital Status: Patient unable to answer   Intimate Partner Violence: Patient Unable To Answer (10/29/2024)    Humiliation, Afraid, Rape, and Kick questionnaire     Fear of Current or Ex-Partner: Patient unable to answer     Emotionally Abused: Patient unable to answer     Physically Abused: Patient unable to answer     Sexually Abused: Patient unable to answer   Housing Stability: Low Risk  (10/29/2024)    Housing Stability Vital Sign     Unable to Pay for Housing in the Last Year: No     Number of Times Moved in the Last Year: 0     Homeless in the Last Year: No       SCREENINGS                        PHYSICAL EXAM    (up to 7 for level 4, 8 or more for level 5)     ED Triage Vitals [11/26/24 1320]   Temp Heart Rate Respirations BP   -- 60 20 111/77      Pulse Ox Temp src Heart Rate Source Patient Position   95 % -- -- --      BP Location FiO2 (%)     -- --       Physical Exam  Vitals and nursing note reviewed.   Constitutional:       General: He is not in acute distress.     Appearance: Normal appearance. He is not ill-appearing.      Comments: Airway: Intact, no blood or vomitus in the airway  Breathing: Clear and equal bilaterally without increased work of breathing.  Circulation: 2+ radial, dp, pt pulses bilaterally  Disability: GCS15, moving all four extremities to command  Exposure: See below   HENT:      Head: Normocephalic and atraumatic.      Comments: No cephalohematomas, no depressed skull fractures.   There is a small abrasion  over the front of the forehead.     Right Ear: There is impacted cerumen.      Left Ear: Tympanic membrane and ear canal normal.      Ears:      Comments: No hemotympanum     Nose: Nose normal.      Comments: No epistaxis, no nasal septal hematoma     Mouth/Throat:      Mouth: Mucous membranes are moist.      Comments: No midface instability. No blood in the oropharynx. No loose dentition. No tongue lacerations.  Eyes:      General:         Right eye: No discharge.         Left eye: No discharge.      Extraocular Movements: Extraocular movements intact.      Pupils: Pupils are equal, round, and reactive to light.   Neck:      Comments: No midline c spine tenderness or step offs.  Cardiovascular:      Rate and Rhythm: Tachycardia present. Rhythm irregular.      Pulses: Normal pulses.      Heart sounds: Normal heart sounds. No murmur heard.  Pulmonary:      Effort: Pulmonary effort is normal. No respiratory distress.      Breath sounds: Normal breath sounds.   Abdominal:      General: Abdomen is flat. There is no distension.      Palpations: Abdomen is soft.      Tenderness: There is no abdominal tenderness. There is no right CVA tenderness or left CVA tenderness.   Genitourinary:     Rectum: Normal.   Musculoskeletal:         General: No swelling or tenderness. Normal range of motion.      Cervical back: Normal range of motion and neck supple.      Comments: No tenderness to palpation throughout the chest wall, pelvis, RUE, LUE, RLE, LLE. No step offs or crepitance throughout the T/L spine.    Skin:     General: Skin is warm.      Capillary Refill: Capillary refill takes less than 2 seconds.   Neurological:      Mental Status: He is alert. Mental status is at baseline.   Psychiatric:         Mood and Affect: Mood normal.         Behavior: Behavior normal.          DIAGNOSTIC RESULTS     LABS:  Labs Reviewed   COMPREHENSIVE METABOLIC PANEL - Abnormal       Result Value    Glucose 138 (*)     Sodium 141      Potassium  4.2      Chloride 104      Bicarbonate 27      Anion Gap 14      Urea Nitrogen 30 (*)     Creatinine 1.30      eGFR 53 (*)     Calcium 8.8      Albumin 3.2 (*)     Alkaline Phosphatase 109      Total Protein 5.9 (*)     AST 22      Bilirubin, Total 0.8      ALT 20     CBC WITH AUTO DIFFERENTIAL - Abnormal    WBC 9.6      nRBC 0.0      RBC 3.09 (*)     Hemoglobin 9.5 (*)     Hematocrit 30.4 (*)     MCV 98      MCH 30.7      MCHC 31.3 (*)     RDW 16.1 (*)     Platelets 338      Neutrophils % 73.8      Immature Granulocytes %, Automated 0.5      Lymphocytes % 14.6      Monocytes % 9.1      Eosinophils % 1.6      Basophils % 0.4      Neutrophils Absolute 7.10 (*)     Immature Granulocytes Absolute, Automated 0.05      Lymphocytes Absolute 1.40      Monocytes Absolute 0.87 (*)     Eosinophils Absolute 0.15      Basophils Absolute 0.04     PROTIME-INR - Abnormal    Protime 49.2 (*)     INR 4.3 (*)    TROPONIN I, HIGH SENSITIVITY - Normal    Troponin I, High Sensitivity 19      Narrative:     Less than 99th percentile of normal range cutoff-  Female and children under 18 years old <14 ng/L; Male <21 ng/L: Negative  Repeat testing should be performed if clinically indicated.     Female and children under 18 years old 14-50 ng/L; Male 21-50 ng/L:  Consistent with possible cardiac damage and possible increased clinical   risk. Serial measurements may help to assess extent of myocardial damage.     >50 ng/L: Consistent with cardiac damage, increased clinical risk and  myocardial infarction. Serial measurements may help assess extent of   myocardial damage.      NOTE: Children less than 1 year old may have higher baseline troponin   levels and results should be interpreted in conjunction with the overall   clinical context.     NOTE: Troponin I testing is performed using a different   testing methodology at [facility] than at other   [facility]. Direct result comparisons should only   be made within the same  method.   MAGNESIUM - Normal    Magnesium 1.84         All other labs were within normal range or not returned as of this dictation.    Imaging  XR chest 1 view   Final Result   Moderate enlargement of cardiac silhouette. No consolidation or   effusion seen             MACRO:   None        Signed by: Brendan Fonseca 11/26/2024 2:55 PM   Dictation workstation:   [id number]      CT head wo IV contrast   Final Result   1. Diffuse volume loss and periventricular white matter changes, most   consistent with small vessel ischemic disease.   2. No evidence of acute cortical infarct or intracranial hemorrhage.             MACRO:   None        Signed by: Socrates Montejo 11/26/2024 2:06 PM   Dictation workstation:   [id number]      CT cervical spine wo IV contrast   Final Result   1.  No CT evidence of acute fracture.   2.  Degenerative changes throughout the cervical spine, as above.        Signed by: Socrates Montejo 11/26/2024 2:08 PM   Dictation workstation:   [id number]           Procedures  Critical Care    Performed by: Keon Harrison DO  Authorized by: Krzysztof Pal MD    Critical care provider statement:     Critical care time (minutes):  35    Critical care time was exclusive of:  Separately billable procedures and treating other patients    Critical care was necessary to treat or prevent imminent or life-threatening deterioration of the following conditions:  Trauma    Critical care was time spent personally by me on the following activities:  Evaluation of patient's response to treatment, examination of patient, ordering and performing treatments and interventions, ordering and review of laboratory studies, ordering and review of radiographic studies, pulse oximetry and re-evaluation of patient's condition       EMERGENCY DEPARTMENT COURSE/MDM:     ED Course as of 11/26/24 1607   Tue Nov 26, 2024   1349 EKG taken at 1328 on 26 November 2024 showing A-fib with RVR with a rate of 101, normal axis, normal intervals  otherwise, no acute ST elevation or depression [DS]   1433 Heart rate 90 at this time [RD]   1501 Will give home dose 50mg oral metoprolol succinate [RD]      ED Course User Index  [DS] Krzysztof Pal MD  [RD] Keon Harrison DO         Diagnoses as of 11/26/24 1607   Fall, initial encounter   Supratherapeutic INR   Atrial fibrillation with rapid ventricular response (Multi)        Medical Decision Making  89-year-old male comes emergency after a fall, he is on Coumadin, was in a rapid A-fib RVR pattern here on initial EKG and telemonitoring.    Trauma survey performed, documented above, cardiac laboratory studies, magnesium levels obtained, head and neck CT along with chest x-ray obtained here as well.    Independent review of labs, CBC shows normal white count, stable hemoglobin, normal platelets, chemistry shows normal electrolytes, normal renal and hepatic function, INR is supratherapeutic at 4.3, did put in paperwork to hold Coumadin for a week, have repeat check in 1 week, troponin negative, no acute injury pattern on EKG, electrolytes otherwise within normal limits, he was given a tetanus update here, 50 mcg of fentanyl for pain, a liter of IV lactated Ringer's, reassessment heart rate had downtrended at this time and was rate controlled.  Did order second 1 L fluid bolus, IV magnesium, extra dose of home oral metoprolol as heart rate was intermittently fluctuant, patient was signed out to oncoming provider pending reevaluation and disposition which is likely discharge to facility.        Patient and or family in agreement and understanding of treatment plan.  All questions answered.      I reviewed the case with the attending ED physician. The attending ED physician agrees with the plan. Patient and/or patient´s representative was counseled regarding labs, imaging, likely diagnosis, and plan. All questions were answered.    ED Medications administered this visit:    Medications   magnesium sulfate 2 g in  sterile water for injection 50 mL (has no administration in time range)   metoprolol succinate XL (Toprol-XL) 24 hr tablet 50 mg (has no administration in time range)   lactated Ringer's bolus 1,000 mL (0 mL intravenous Stopped 11/26/24 1601)   fentaNYL PF (Sublimaze) injection 50 mcg (50 mcg intravenous Given 11/26/24 1424)   diphth,pertus(acell),tetanus (BoostRIX) 2.5-8-5 Lf-mcg-Lf/0.5mL vaccine 0.5 mL (0.5 mL intramuscular Given 11/26/24 1407)       New Prescriptions from this visit:    New Prescriptions    No medications on file       Follow-up:  Flora Rivera DO  [address] [place], [place] [id number] [phone]    Schedule an appointment as soon as possible for a visit           Final Impression:   1. Fall, initial encounter    2. Supratherapeutic INR    3. Atrial fibrillation with rapid ventricular response (Multi)          (Please note that portions of this note were completed with a voice recognition program.  Efforts were made to edit the dictations but occasionally words are mis-transcribed.)     Keon Harrison DO  Resident  11/26/24 9000

## 2024-11-27 ENCOUNTER — OFFICE VISIT (OUTPATIENT)
Dept: GERIATRIC MEDICINE | Age: 88
End: 2024-11-27
Payer: MEDICARE

## 2024-11-27 DIAGNOSIS — F03.918 DEMENTIA WITH BEHAVIORAL DISTURBANCE (HCC): Primary | ICD-10-CM

## 2024-11-27 DIAGNOSIS — I48.91 ATRIAL FIBRILLATION, UNSPECIFIED TYPE (HCC): ICD-10-CM
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
## 2024-11-27 DIAGNOSIS — R33.9 URINARY RETENTION: ICD-10-CM

## 2024-11-27 PROCEDURE — 1123F ACP DISCUSS/DSCN MKR DOCD: CPT | Performed by: INTERNAL MEDICINE

## 2024-11-27 PROCEDURE — G8484 FLU IMMUNIZE NO ADMIN: HCPCS | Performed by: INTERNAL MEDICINE

## 2024-11-27 PROCEDURE — 99308 SBSQ NF CARE LOW MDM 20: CPT | Performed by: INTERNAL MEDICINE

## 2024-11-30 LAB
ATRIAL RATE: 357 BPM
Q ONSET: 224 MS
QRS COUNT: 17 BEATS
QRS DURATION: 70 MS
QT INTERVAL: 340 MS
QTC CALCULATION(BAZETT): 440 MS
QTC FREDERICIA: 404 MS
R AXIS: 88 DEGREES
T AXIS: 54 DEGREES
T OFFSET: 394 MS
VENTRICULAR RATE: 101 BPM

## 2024-12-03 ENCOUNTER — OFFICE VISIT (OUTPATIENT)
Dept: GERIATRIC MEDICINE | Age: 88
End: 2024-12-03
Payer: MEDICARE

## 2024-12-03 DIAGNOSIS — F03.918 DEMENTIA WITH BEHAVIORAL DISTURBANCE (HCC): Primary | ICD-10-CM

## 2024-12-03 DIAGNOSIS — R33.9 URINARY RETENTION: ICD-10-CM

## 2024-12-03 DIAGNOSIS — I48.91 ATRIAL FIBRILLATION, UNSPECIFIED TYPE (HCC): ICD-10-CM

## 2024-12-03 PROCEDURE — 99316 NF DSCHRG MGMT 30 MIN+: CPT | Performed by: INTERNAL MEDICINE

## 2024-12-03 PROCEDURE — G8484 FLU IMMUNIZE NO ADMIN: HCPCS | Performed by: INTERNAL MEDICINE

## 2024-12-03 NOTE — PROGRESS NOTES
Discharge Summary       Discharge Disposition: [facility]     Discharge Condition stable       Discharge time 33 min       Medications   Current Outpatient Medications   Medication Sig Dispense Refill    acetaminophen (TYLENOL) 500 MG tablet Take 1 tablet by mouth every 4 hours as needed for Pain (dne 3g in 24hours)      allopurinol (ZYLOPRIM) 100 MG tablet Take 1 tablet by mouth daily Indications: Gout      LORazepam (ATIVAN) 0.5 MG tablet Take 1 tablet by mouth in the morning and at bedtime. Max Daily Amount: 1 mg      divalproex (DEPAKOTE SPRINKLE) 125 MG DR capsule Take 2 capsules by mouth 2 times daily Indications: Problems with Behavior      loperamide (IMODIUM) 2 MG capsule Take 1 capsule by mouth 4 times daily as needed for Diarrhea Give 2 mg for loose stool & 1 tab for subsequent loose stools. DNE 3 tabs/24hours      metoprolol succinate (TOPROL XL) 50 MG extended release tablet Take 1 tablet by mouth daily Indications: High Blood Pressure      tamsulosin (FLOMAX) 0.4 MG capsule Take 1 capsule by mouth daily Indications: Urinary Retention      venlafaxine (EFFEXOR) 50 MG tablet Take 1 tablet by mouth daily Indications: Depression      vitamin D (VITAMIN D3) 50 MCG (2000 UT) CAPS capsule Take 2 capsules by mouth daily Indications: Nutritional Support      warfarin (COUMADIN) 5 MG tablet Take 1.5 tablets by mouth daily Indications: Anticoagulant Therapy       No current facility-administered medications for this visit.       Diet- regular     Activity - as tolerated         Brief SNF Course--     This is an 89 y.o. male who is being seen for dementia. Psych followed him while at the facility. INR was monitored. He did have a fall and went to the ED.           Discharge Diagnosis :    Dementia   A fib   HLD   Urinary retention         Follow up --     PCP in 1-2 weeks   Urology 12/23          YAIR Jeronimo DO     Electronically signed by:

## 2024-12-08 ENCOUNTER — APPOINTMENT (OUTPATIENT)
Dept: CARDIOLOGY | Facility: HOSPITAL | Age: 89
End: 2024-12-08
Payer: MEDICARE

## 2024-12-08 ENCOUNTER — APPOINTMENT (OUTPATIENT)
Dept: RADIOLOGY | Facility: HOSPITAL | Age: 89
End: 2024-12-08
Payer: MEDICARE

## 2024-12-08 ENCOUNTER — HOSPITAL ENCOUNTER (OUTPATIENT)
Facility: HOSPITAL | Age: 89
Setting detail: OBSERVATION
Discharge: HOME | End: 2024-12-09
Attending: STUDENT IN AN ORGANIZED HEALTH CARE EDUCATION/TRAINING PROGRAM | Admitting: INTERNAL MEDICINE
Payer: MEDICARE

## 2024-12-08 DIAGNOSIS — R33.9 URINARY RETENTION: ICD-10-CM

## 2024-12-08 DIAGNOSIS — N39.0 URINARY TRACT INFECTION ASSOCIATED WITH INDWELLING URETHRAL CATHETER, INITIAL ENCOUNTER (CMS-HCC): Primary | ICD-10-CM

## 2024-12-08 DIAGNOSIS — T83.511A URINARY TRACT INFECTION ASSOCIATED WITH INDWELLING URETHRAL CATHETER, INITIAL ENCOUNTER (CMS-HCC): Primary | ICD-10-CM

## 2024-12-08 LAB
ALBUMIN SERPL BCP-MCNC: 3.2 G/DL (ref 3.4–5)
ALBUMIN SERPL BCP-MCNC: 3.2 G/DL (ref 3.4–5)
ALP SERPL-CCNC: 103 U/L (ref 33–136)
ALT SERPL W P-5'-P-CCNC: 13 U/L (ref 10–52)
ANION GAP SERPL CALC-SCNC: 13 MMOL/L (ref 10–20)
APPEARANCE UR: ABNORMAL
AST SERPL W P-5'-P-CCNC: 21 U/L (ref 9–39)
BACTERIA #/AREA URNS AUTO: ABNORMAL /HPF
BASOPHILS # BLD AUTO: 0.04 X10*3/UL (ref 0–0.1)
BASOPHILS NFR BLD AUTO: 0.3 %
BILIRUB DIRECT SERPL-MCNC: 0.2 MG/DL (ref 0–0.3)
BILIRUB SERPL-MCNC: 1.2 MG/DL (ref 0–1.2)
BILIRUB UR STRIP.AUTO-MCNC: NEGATIVE MG/DL
BUN SERPL-MCNC: 25 MG/DL (ref 6–23)
CALCIUM SERPL-MCNC: 8.9 MG/DL (ref 8.6–10.3)
CARDIAC TROPONIN I PNL SERPL HS: 25 NG/L (ref 0–20)
CHLORIDE SERPL-SCNC: 104 MMOL/L (ref 98–107)
CO2 SERPL-SCNC: 27 MMOL/L (ref 21–32)
COLOR UR: ABNORMAL
CREAT SERPL-MCNC: 1.18 MG/DL (ref 0.5–1.3)
EGFRCR SERPLBLD CKD-EPI 2021: 59 ML/MIN/1.73M*2
EOSINOPHIL # BLD AUTO: 0.01 X10*3/UL (ref 0–0.4)
EOSINOPHIL NFR BLD AUTO: 0.1 %
ERYTHROCYTE [DISTWIDTH] IN BLOOD BY AUTOMATED COUNT: 16.3 % (ref 11.5–14.5)
GLUCOSE SERPL-MCNC: 98 MG/DL (ref 74–99)
GLUCOSE UR STRIP.AUTO-MCNC: NORMAL MG/DL
HCT VFR BLD AUTO: 35.4 % (ref 41–52)
HGB BLD-MCNC: 11 G/DL (ref 13.5–17.5)
IMM GRANULOCYTES # BLD AUTO: 0.05 X10*3/UL (ref 0–0.5)
IMM GRANULOCYTES NFR BLD AUTO: 0.4 % (ref 0–0.9)
INR PPP: 1.1 (ref 0.9–1.1)
KETONES UR STRIP.AUTO-MCNC: ABNORMAL MG/DL
LACTATE SERPL-SCNC: 2 MMOL/L (ref 0.4–2)
LEUKOCYTE ESTERASE UR QL STRIP.AUTO: ABNORMAL
LYMPHOCYTES # BLD AUTO: 0.79 X10*3/UL (ref 0.8–3)
LYMPHOCYTES NFR BLD AUTO: 6.8 %
MCH RBC QN AUTO: 31 PG (ref 26–34)
MCHC RBC AUTO-ENTMCNC: 31.1 G/DL (ref 32–36)
MCV RBC AUTO: 100 FL (ref 80–100)
MONOCYTES # BLD AUTO: 0.75 X10*3/UL (ref 0.05–0.8)
MONOCYTES NFR BLD AUTO: 6.5 %
NEUTROPHILS # BLD AUTO: 9.98 X10*3/UL (ref 1.6–5.5)
NEUTROPHILS NFR BLD AUTO: 85.9 %
NITRITE UR QL STRIP.AUTO: ABNORMAL
NRBC BLD-RTO: 0 /100 WBCS (ref 0–0)
PH UR STRIP.AUTO: 6 [PH]
PHOSPHATE SERPL-MCNC: 3.3 MG/DL (ref 2.5–4.9)
PLATELET # BLD AUTO: 291 X10*3/UL (ref 150–450)
POTASSIUM SERPL-SCNC: 4.6 MMOL/L (ref 3.5–5.3)
PROT SERPL-MCNC: 6.4 G/DL (ref 6.4–8.2)
PROT UR STRIP.AUTO-MCNC: ABNORMAL MG/DL
PROTHROMBIN TIME: 11.9 SECONDS (ref 9.8–12.8)
RBC # BLD AUTO: 3.55 X10*6/UL (ref 4.5–5.9)
RBC # UR STRIP.AUTO: ABNORMAL /UL
RBC #/AREA URNS AUTO: >20 /HPF
SODIUM SERPL-SCNC: 139 MMOL/L (ref 136–145)
SP GR UR STRIP.AUTO: 1.02
UROBILINOGEN UR STRIP.AUTO-MCNC: NORMAL MG/DL
WBC # BLD AUTO: 11.6 X10*3/UL (ref 4.4–11.3)
WBC #/AREA URNS AUTO: >50 /HPF

## 2024-12-08 PROCEDURE — 51798 US URINE CAPACITY MEASURE: CPT

## 2024-12-08 PROCEDURE — 93005 ELECTROCARDIOGRAM TRACING: CPT

## 2024-12-08 PROCEDURE — 99285 EMERGENCY DEPT VISIT HI MDM: CPT | Mod: 25 | Performed by: STUDENT IN AN ORGANIZED HEALTH CARE EDUCATION/TRAINING PROGRAM

## 2024-12-08 PROCEDURE — 85610 PROTHROMBIN TIME: CPT | Performed by: STUDENT IN AN ORGANIZED HEALTH CARE EDUCATION/TRAINING PROGRAM

## 2024-12-08 PROCEDURE — 36415 COLL VENOUS BLD VENIPUNCTURE: CPT | Performed by: STUDENT IN AN ORGANIZED HEALTH CARE EDUCATION/TRAINING PROGRAM

## 2024-12-08 PROCEDURE — 87086 URINE CULTURE/COLONY COUNT: CPT | Mod: STJLAB

## 2024-12-08 PROCEDURE — 36415 COLL VENOUS BLD VENIPUNCTURE: CPT

## 2024-12-08 PROCEDURE — 2500000005 HC RX 250 GENERAL PHARMACY W/O HCPCS

## 2024-12-08 PROCEDURE — 93010 ELECTROCARDIOGRAM REPORT: CPT | Performed by: STUDENT IN AN ORGANIZED HEALTH CARE EDUCATION/TRAINING PROGRAM

## 2024-12-08 PROCEDURE — 96361 HYDRATE IV INFUSION ADD-ON: CPT | Mod: 59

## 2024-12-08 PROCEDURE — 2500000005 HC RX 250 GENERAL PHARMACY W/O HCPCS: Performed by: STUDENT IN AN ORGANIZED HEALTH CARE EDUCATION/TRAINING PROGRAM

## 2024-12-08 PROCEDURE — 96365 THER/PROPH/DIAG IV INF INIT: CPT | Mod: 59

## 2024-12-08 PROCEDURE — 82040 ASSAY OF SERUM ALBUMIN: CPT | Mod: 59 | Performed by: STUDENT IN AN ORGANIZED HEALTH CARE EDUCATION/TRAINING PROGRAM

## 2024-12-08 PROCEDURE — 2500000004 HC RX 250 GENERAL PHARMACY W/ HCPCS (ALT 636 FOR OP/ED): Performed by: STUDENT IN AN ORGANIZED HEALTH CARE EDUCATION/TRAINING PROGRAM

## 2024-12-08 PROCEDURE — 71045 X-RAY EXAM CHEST 1 VIEW: CPT

## 2024-12-08 PROCEDURE — 87077 CULTURE AEROBIC IDENTIFY: CPT | Mod: STJLAB | Performed by: STUDENT IN AN ORGANIZED HEALTH CARE EDUCATION/TRAINING PROGRAM

## 2024-12-08 PROCEDURE — 85025 COMPLETE CBC W/AUTO DIFF WBC: CPT | Performed by: STUDENT IN AN ORGANIZED HEALTH CARE EDUCATION/TRAINING PROGRAM

## 2024-12-08 PROCEDURE — 84484 ASSAY OF TROPONIN QUANT: CPT | Performed by: STUDENT IN AN ORGANIZED HEALTH CARE EDUCATION/TRAINING PROGRAM

## 2024-12-08 PROCEDURE — 51702 INSERT TEMP BLADDER CATH: CPT

## 2024-12-08 PROCEDURE — 81001 URINALYSIS AUTO W/SCOPE: CPT

## 2024-12-08 PROCEDURE — 71045 X-RAY EXAM CHEST 1 VIEW: CPT | Performed by: STUDENT IN AN ORGANIZED HEALTH CARE EDUCATION/TRAINING PROGRAM

## 2024-12-08 PROCEDURE — 99285 EMERGENCY DEPT VISIT HI MDM: CPT | Performed by: STUDENT IN AN ORGANIZED HEALTH CARE EDUCATION/TRAINING PROGRAM

## 2024-12-08 PROCEDURE — 83605 ASSAY OF LACTIC ACID: CPT | Performed by: STUDENT IN AN ORGANIZED HEALTH CARE EDUCATION/TRAINING PROGRAM

## 2024-12-08 PROCEDURE — 84100 ASSAY OF PHOSPHORUS: CPT

## 2024-12-08 RX ORDER — LIDOCAINE HYDROCHLORIDE 20 MG/ML
1 JELLY TOPICAL ONCE
Status: COMPLETED | OUTPATIENT
Start: 2024-12-08 | End: 2024-12-08

## 2024-12-08 RX ORDER — METOPROLOL TARTRATE 1 MG/ML
5 INJECTION, SOLUTION INTRAVENOUS ONCE
Status: DISCONTINUED | OUTPATIENT
Start: 2024-12-08 | End: 2024-12-08

## 2024-12-08 RX ORDER — CEFTRIAXONE 1 G/50ML
1 INJECTION, SOLUTION INTRAVENOUS ONCE
Status: COMPLETED | OUTPATIENT
Start: 2024-12-08 | End: 2024-12-08

## 2024-12-08 RX ORDER — SODIUM CHLORIDE 0.9 G/100ML
3000 INJECTION, SOLUTION IRRIGATION ONCE
Status: COMPLETED | OUTPATIENT
Start: 2024-12-08 | End: 2024-12-08

## 2024-12-08 RX ORDER — WATER
500 LIQUID (ML) MISCELLANEOUS ONCE
Status: DISCONTINUED | OUTPATIENT
Start: 2024-12-08 | End: 2024-12-08

## 2024-12-08 RX ADMIN — LIDOCAINE HYDROCHLORIDE 1 APPLICATION: 20 JELLY TOPICAL at 21:25

## 2024-12-08 RX ADMIN — SODIUM CHLORIDE, POTASSIUM CHLORIDE, SODIUM LACTATE AND CALCIUM CHLORIDE 1000 ML: 600; 310; 30; 20 INJECTION, SOLUTION INTRAVENOUS at 22:30

## 2024-12-08 RX ADMIN — SODIUM CHLORIDE 3000 ML: 900 IRRIGANT IRRIGATION at 21:25

## 2024-12-08 RX ADMIN — CEFTRIAXONE SODIUM 1 G: 1 INJECTION, SOLUTION INTRAVENOUS at 21:48

## 2024-12-08 ASSESSMENT — COLUMBIA-SUICIDE SEVERITY RATING SCALE - C-SSRS
6. HAVE YOU EVER DONE ANYTHING, STARTED TO DO ANYTHING, OR PREPARED TO DO ANYTHING TO END YOUR LIFE?: NO
2. HAVE YOU ACTUALLY HAD ANY THOUGHTS OF KILLING YOURSELF?: NO
1. IN THE PAST MONTH, HAVE YOU WISHED YOU WERE DEAD OR WISHED YOU COULD GO TO SLEEP AND NOT WAKE UP?: NO

## 2024-12-08 ASSESSMENT — LIFESTYLE VARIABLES
HAVE PEOPLE ANNOYED YOU BY CRITICIZING YOUR DRINKING: NO
EVER HAD A DRINK FIRST THING IN THE MORNING TO STEADY YOUR NERVES TO GET RID OF A HANGOVER: NO
HAVE YOU EVER FELT YOU SHOULD CUT DOWN ON YOUR DRINKING: NO
TOTAL SCORE: 0
EVER FELT BAD OR GUILTY ABOUT YOUR DRINKING: NO

## 2024-12-08 ASSESSMENT — PAIN - FUNCTIONAL ASSESSMENT: PAIN_FUNCTIONAL_ASSESSMENT: 0-10

## 2024-12-08 ASSESSMENT — PAIN SCALES - GENERAL: PAINLEVEL_OUTOF10: 0 - NO PAIN

## 2024-12-09 VITALS
BODY MASS INDEX: 21 KG/M2 | DIASTOLIC BLOOD PRESSURE: 57 MMHG | TEMPERATURE: 97.2 F | OXYGEN SATURATION: 98 % | RESPIRATION RATE: 16 BRPM | SYSTOLIC BLOOD PRESSURE: 93 MMHG | WEIGHT: 150 LBS | HEIGHT: 71 IN | HEART RATE: 89 BPM

## 2024-12-09 LAB
ALBUMIN SERPL BCP-MCNC: 3 G/DL (ref 3.4–5)
ALP SERPL-CCNC: 94 U/L (ref 33–136)
ALT SERPL W P-5'-P-CCNC: 9 U/L (ref 10–52)
ANION GAP SERPL CALC-SCNC: 11 MMOL/L (ref 10–20)
AST SERPL W P-5'-P-CCNC: 13 U/L (ref 9–39)
BILIRUB SERPL-MCNC: 1.2 MG/DL (ref 0–1.2)
BUN SERPL-MCNC: 21 MG/DL (ref 6–23)
CALCIUM SERPL-MCNC: 8.7 MG/DL (ref 8.6–10.3)
CHLORIDE SERPL-SCNC: 105 MMOL/L (ref 98–107)
CO2 SERPL-SCNC: 27 MMOL/L (ref 21–32)
CREAT SERPL-MCNC: 1.01 MG/DL (ref 0.5–1.3)
EGFRCR SERPLBLD CKD-EPI 2021: 71 ML/MIN/1.73M*2
ERYTHROCYTE [DISTWIDTH] IN BLOOD BY AUTOMATED COUNT: 16.4 % (ref 11.5–14.5)
GLUCOSE SERPL-MCNC: 100 MG/DL (ref 74–99)
HCT VFR BLD AUTO: 30.4 % (ref 41–52)
HGB BLD-MCNC: 9.6 G/DL (ref 13.5–17.5)
HOLD SPECIMEN: NORMAL
HOLD SPECIMEN: NORMAL
MCH RBC QN AUTO: 31.1 PG (ref 26–34)
MCHC RBC AUTO-ENTMCNC: 31.6 G/DL (ref 32–36)
MCV RBC AUTO: 98 FL (ref 80–100)
NRBC BLD-RTO: 0 /100 WBCS (ref 0–0)
PLATELET # BLD AUTO: 222 X10*3/UL (ref 150–450)
POTASSIUM SERPL-SCNC: 3.7 MMOL/L (ref 3.5–5.3)
PROT SERPL-MCNC: 5.9 G/DL (ref 6.4–8.2)
RBC # BLD AUTO: 3.09 X10*6/UL (ref 4.5–5.9)
SODIUM SERPL-SCNC: 139 MMOL/L (ref 136–145)
WBC # BLD AUTO: 10.9 X10*3/UL (ref 4.4–11.3)

## 2024-12-09 PROCEDURE — 36415 COLL VENOUS BLD VENIPUNCTURE: CPT | Performed by: INTERNAL MEDICINE

## 2024-12-09 PROCEDURE — 2500000004 HC RX 250 GENERAL PHARMACY W/ HCPCS (ALT 636 FOR OP/ED): Performed by: INTERNAL MEDICINE

## 2024-12-09 PROCEDURE — 85027 COMPLETE CBC AUTOMATED: CPT | Performed by: INTERNAL MEDICINE

## 2024-12-09 PROCEDURE — 96375 TX/PRO/DX INJ NEW DRUG ADDON: CPT | Mod: 59

## 2024-12-09 PROCEDURE — 99223 1ST HOSP IP/OBS HIGH 75: CPT | Performed by: INTERNAL MEDICINE

## 2024-12-09 PROCEDURE — 80053 COMPREHEN METABOLIC PANEL: CPT | Performed by: INTERNAL MEDICINE

## 2024-12-09 PROCEDURE — G0378 HOSPITAL OBSERVATION PER HR: HCPCS

## 2024-12-09 PROCEDURE — 2500000001 HC RX 250 WO HCPCS SELF ADMINISTERED DRUGS (ALT 637 FOR MEDICARE OP): Performed by: INTERNAL MEDICINE

## 2024-12-09 PROCEDURE — 99239 HOSP IP/OBS DSCHRG MGMT >30: CPT

## 2024-12-09 PROCEDURE — 96361 HYDRATE IV INFUSION ADD-ON: CPT | Mod: 59

## 2024-12-09 RX ORDER — ACETAMINOPHEN 325 MG/1
650 TABLET ORAL EVERY 4 HOURS PRN
Status: DISCONTINUED | OUTPATIENT
Start: 2024-12-09 | End: 2024-12-09 | Stop reason: HOSPADM

## 2024-12-09 RX ORDER — LORAZEPAM 0.5 MG/1
0.5 TABLET ORAL 2 TIMES DAILY
Status: DISCONTINUED | OUTPATIENT
Start: 2024-12-09 | End: 2024-12-09 | Stop reason: HOSPADM

## 2024-12-09 RX ORDER — ACETAMINOPHEN 650 MG/1
650 SUPPOSITORY RECTAL EVERY 4 HOURS PRN
Status: DISCONTINUED | OUTPATIENT
Start: 2024-12-09 | End: 2024-12-09 | Stop reason: HOSPADM

## 2024-12-09 RX ORDER — ACETAMINOPHEN 160 MG/5ML
650 SOLUTION ORAL EVERY 4 HOURS PRN
Status: DISCONTINUED | OUTPATIENT
Start: 2024-12-09 | End: 2024-12-09 | Stop reason: HOSPADM

## 2024-12-09 RX ORDER — TAMSULOSIN HYDROCHLORIDE 0.4 MG/1
0.4 CAPSULE ORAL DAILY
Qty: 30 CAPSULE | Refills: 0 | Status: SHIPPED | OUTPATIENT
Start: 2024-12-09 | End: 2025-01-08

## 2024-12-09 RX ORDER — CIPROFLOXACIN 500 MG/1
500 TABLET ORAL 2 TIMES DAILY
COMMUNITY
Start: 2024-12-08 | End: 2024-12-15

## 2024-12-09 RX ORDER — LORAZEPAM 0.5 MG/1
0.5 TABLET ORAL EVERY 4 HOURS PRN
COMMUNITY

## 2024-12-09 RX ORDER — ONDANSETRON 4 MG/1
4 TABLET, ORALLY DISINTEGRATING ORAL EVERY 8 HOURS PRN
Status: DISCONTINUED | OUTPATIENT
Start: 2024-12-09 | End: 2024-12-09 | Stop reason: HOSPADM

## 2024-12-09 RX ORDER — METOPROLOL SUCCINATE 50 MG/1
50 TABLET, EXTENDED RELEASE ORAL DAILY
Status: DISCONTINUED | OUTPATIENT
Start: 2024-12-09 | End: 2024-12-09

## 2024-12-09 RX ORDER — CEFTRIAXONE 1 G/50ML
1 INJECTION, SOLUTION INTRAVENOUS EVERY 24 HOURS
Status: DISCONTINUED | OUTPATIENT
Start: 2024-12-09 | End: 2024-12-09 | Stop reason: HOSPADM

## 2024-12-09 RX ORDER — DIVALPROEX SODIUM 125 MG/1
250 CAPSULE, COATED PELLETS ORAL 2 TIMES DAILY
Status: DISCONTINUED | OUTPATIENT
Start: 2024-12-09 | End: 2024-12-09 | Stop reason: HOSPADM

## 2024-12-09 RX ORDER — ONDANSETRON HYDROCHLORIDE 2 MG/ML
4 INJECTION, SOLUTION INTRAVENOUS EVERY 8 HOURS PRN
Status: DISCONTINUED | OUTPATIENT
Start: 2024-12-09 | End: 2024-12-09 | Stop reason: HOSPADM

## 2024-12-09 RX ORDER — VENLAFAXINE 25 MG/1
50 TABLET ORAL EVERY EVENING
Status: DISCONTINUED | OUTPATIENT
Start: 2024-12-09 | End: 2024-12-09 | Stop reason: HOSPADM

## 2024-12-09 RX ORDER — PANTOPRAZOLE SODIUM 40 MG/1
40 TABLET, DELAYED RELEASE ORAL
Status: DISCONTINUED | OUTPATIENT
Start: 2024-12-09 | End: 2024-12-09 | Stop reason: HOSPADM

## 2024-12-09 RX ORDER — ALLOPURINOL 100 MG/1
100 TABLET ORAL DAILY
Status: DISCONTINUED | OUTPATIENT
Start: 2024-12-09 | End: 2024-12-09 | Stop reason: HOSPADM

## 2024-12-09 RX ORDER — PANTOPRAZOLE SODIUM 40 MG/10ML
40 INJECTION, POWDER, LYOPHILIZED, FOR SOLUTION INTRAVENOUS
Status: DISCONTINUED | OUTPATIENT
Start: 2024-12-09 | End: 2024-12-09 | Stop reason: HOSPADM

## 2024-12-09 RX ORDER — TRAZODONE HYDROCHLORIDE 50 MG/1
50 TABLET ORAL NIGHTLY
COMMUNITY

## 2024-12-09 RX ORDER — TRAZODONE HYDROCHLORIDE 50 MG/1
50 TABLET ORAL NIGHTLY
Status: DISCONTINUED | OUTPATIENT
Start: 2024-12-09 | End: 2024-12-09 | Stop reason: HOSPADM

## 2024-12-09 RX ORDER — TALC
3 POWDER (GRAM) TOPICAL NIGHTLY PRN
Status: DISCONTINUED | OUTPATIENT
Start: 2024-12-09 | End: 2024-12-09 | Stop reason: HOSPADM

## 2024-12-09 RX ADMIN — PANTOPRAZOLE SODIUM 40 MG: 40 INJECTION, POWDER, FOR SOLUTION INTRAVENOUS at 05:00

## 2024-12-09 RX ADMIN — ALLOPURINOL 100 MG: 100 TABLET ORAL at 09:41

## 2024-12-09 RX ADMIN — DIVALPROEX SODIUM 250 MG: 125 CAPSULE, COATED PELLETS ORAL at 09:41

## 2024-12-09 SDOH — SOCIAL STABILITY: SOCIAL INSECURITY: HAS ANYONE EVER THREATENED TO HURT YOUR FAMILY OR YOUR PETS?: UNABLE TO ASSESS

## 2024-12-09 SDOH — SOCIAL STABILITY: SOCIAL INSECURITY: HAVE YOU HAD ANY THOUGHTS OF HARMING ANYONE ELSE?: UNABLE TO ASSESS

## 2024-12-09 SDOH — SOCIAL STABILITY: SOCIAL INSECURITY: WITHIN THE LAST YEAR, HAVE YOU BEEN AFRAID OF YOUR PARTNER OR EX-PARTNER?: PATIENT UNABLE TO ANSWER

## 2024-12-09 SDOH — SOCIAL STABILITY: SOCIAL INSECURITY: HAVE YOU HAD THOUGHTS OF HARMING ANYONE ELSE?: UNABLE TO ASSESS

## 2024-12-09 SDOH — SOCIAL STABILITY: SOCIAL INSECURITY: DOES ANYONE TRY TO KEEP YOU FROM HAVING/CONTACTING OTHER FRIENDS OR DOING THINGS OUTSIDE YOUR HOME?: UNABLE TO ASSESS

## 2024-12-09 SDOH — SOCIAL STABILITY: SOCIAL INSECURITY: ABUSE: ADULT

## 2024-12-09 SDOH — SOCIAL STABILITY: SOCIAL INSECURITY: ARE THERE ANY APPARENT SIGNS OF INJURIES/BEHAVIORS THAT COULD BE RELATED TO ABUSE/NEGLECT?: NO

## 2024-12-09 SDOH — SOCIAL STABILITY: SOCIAL INSECURITY: WERE YOU ABLE TO COMPLETE ALL THE BEHAVIORAL HEALTH SCREENINGS?: NO

## 2024-12-09 SDOH — ECONOMIC STABILITY: INCOME INSECURITY: IN THE PAST 12 MONTHS HAS THE ELECTRIC, GAS, OIL, OR WATER COMPANY THREATENED TO SHUT OFF SERVICES IN YOUR HOME?: NO

## 2024-12-09 SDOH — SOCIAL STABILITY: SOCIAL INSECURITY: DO YOU FEEL UNSAFE GOING BACK TO THE PLACE WHERE YOU ARE LIVING?: UNABLE TO ASSESS

## 2024-12-09 SDOH — SOCIAL STABILITY: SOCIAL INSECURITY: ARE YOU OR HAVE YOU BEEN THREATENED OR ABUSED PHYSICALLY, EMOTIONALLY, OR SEXUALLY BY ANYONE?: UNABLE TO ASSESS

## 2024-12-09 SDOH — ECONOMIC STABILITY: HOUSING INSECURITY: IN THE PAST 12 MONTHS, HOW MANY TIMES HAVE YOU MOVED WHERE YOU WERE LIVING?: 1

## 2024-12-09 SDOH — SOCIAL STABILITY: SOCIAL INSECURITY: DO YOU FEEL ANYONE HAS EXPLOITED OR TAKEN ADVANTAGE OF YOU FINANCIALLY OR OF YOUR PERSONAL PROPERTY?: UNABLE TO ASSESS

## 2024-12-09 SDOH — ECONOMIC STABILITY: HOUSING INSECURITY: AT ANY TIME IN THE PAST 12 MONTHS, WERE YOU HOMELESS OR LIVING IN A SHELTER (INCLUDING NOW)?: PATIENT UNABLE TO ANSWER

## 2024-12-09 ASSESSMENT — ACTIVITIES OF DAILY LIVING (ADL)
TOILETING: DEPENDENT
FEEDING YOURSELF: DEPENDENT
JUDGMENT_ADEQUATE_SAFELY_COMPLETE_DAILY_ACTIVITIES: NO
DRESSING YOURSELF: DEPENDENT
HEARING - LEFT EAR: UNABLE TO ASSESS
ASSISTIVE_DEVICE: OTHER (COMMENT)
WALKS IN HOME: DEPENDENT
GROOMING: DEPENDENT
LACK_OF_TRANSPORTATION: PATIENT UNABLE TO ANSWER
LACK_OF_TRANSPORTATION: PATIENT UNABLE TO ANSWER
BATHING: DEPENDENT
ADEQUATE_TO_COMPLETE_ADL: UNABLE TO ASSESS
PATIENT'S MEMORY ADEQUATE TO SAFELY COMPLETE DAILY ACTIVITIES?: NO
HEARING - RIGHT EAR: UNABLE TO ASSESS

## 2024-12-09 ASSESSMENT — COGNITIVE AND FUNCTIONAL STATUS - GENERAL
MOBILITY SCORE: 24
DAILY ACTIVITIY SCORE: 23
EATING MEALS: A LITTLE
PATIENT BASELINE BEDBOUND: UNABLE TO ASSESS AT THIS TIME

## 2024-12-09 ASSESSMENT — PAIN - FUNCTIONAL ASSESSMENT: PAIN_FUNCTIONAL_ASSESSMENT: 0-10

## 2024-12-09 ASSESSMENT — PAIN SCALES - GENERAL: PAINLEVEL_OUTOF10: 0 - NO PAIN

## 2024-12-09 ASSESSMENT — PATIENT HEALTH QUESTIONNAIRE - PHQ9
SUM OF ALL RESPONSES TO PHQ9 QUESTIONS 1 & 2: 0
1. LITTLE INTEREST OR PLEASURE IN DOING THINGS: NOT AT ALL
2. FEELING DOWN, DEPRESSED OR HOPELESS: NOT AT ALL

## 2024-12-09 ASSESSMENT — LIFESTYLE VARIABLES
HOW OFTEN DO YOU HAVE A DRINK CONTAINING ALCOHOL: NEVER
AUDIT-C TOTAL SCORE: 0
AUDIT-C TOTAL SCORE: 0
HOW MANY STANDARD DRINKS CONTAINING ALCOHOL DO YOU HAVE ON A TYPICAL DAY: PATIENT DOES NOT DRINK
SKIP TO QUESTIONS 9-10: 1
HOW OFTEN DO YOU HAVE 6 OR MORE DRINKS ON ONE OCCASION: NEVER

## 2024-12-09 NOTE — ED PROVIDER NOTES
EMERGENCY DEPARTMENT ENCOUNTER      Pt Name: Slim Saldivar  MRN: [id number]  Birthdate 1935  Date of evaluation: 12/8/2024    HISTORY OF PRESENT ILLNESS    Slim Saldivar is an 89 y.o. male with history including dementia, CKD stage III, atrial fibrillation no longer on thinners, hyperlipidemia presenting to the emergency department for hematuria and urinary retention.  2 days ago patient pulled out his Calhoun cath with the balloon still intact.  Nursing staff replaced the Calhoun but noted hematuria.  Eventually there was no drainage to the Calhoun they tried flushing but were unsuccessful.  They then remove the Calhoun and patient had passage of large blood clots.  Over the course of today patient has had no urinary output.  He seems to be agitated and uncomfortable sent him in for retention.  Patient cannot provide any additional information due to underlying dementia.      PAST MEDICAL HISTORY     Past Medical History:   Diagnosis Date    Anemia     Chronic kidney disease     Varicella        SURGICAL HISTORY       Past Surgical History:   Procedure Laterality Date    CIRCUMCISION, PRIMARY      OTHER SURGICAL HISTORY  01/20/2022    Tonsillectomy    TONSILLECTOMY      VASECTOMY      WISDOM TOOTH EXTRACTION         CURRENT MEDICATIONS       Previous Medications    ACETAMINOPHEN (TYLENOL) 325 MG TABLET    Take 2 tablets (650 mg) by mouth every 4 hours if needed for mild pain (1 - 3).    ACETAMINOPHEN (TYLENOL) 500 MG TABLET    Take 1 tablet (500 mg) by mouth every 6 hours if needed for mild pain (1 - 3) or fever (temp greater than 38.0 C).    ACETAMINOPHEN (TYLENOL) 650 MG SUPPOSITORY    Insert 1 suppository (650 mg) into the rectum every 4 hours if needed for mild pain (1 - 3) or fever (temp greater than 38.0 C).    ALLOPURINOL (ZYLOPRIM) 100 MG TABLET    TAKE 1 TABLET DAILY    ALUM-MAG HYDROXIDE-SIMETH (MYLANTA) 200-200-20 MG/5 ML ORAL SUSPENSION    Take 30 mL by mouth every 4 hours if needed for indigestion or  heartburn.    ANTI-DIARRHEAL, LOPERAMIDE, 2 MG TABLET    Take 1 tablet (2 mg) by mouth 3 times a day as needed for diarrhea.    CHOLECALCIFEROL (VITAMIN D-3) 50 MCG (2,000 UNIT) CAPSULE    Take 1 capsule (50 mcg) by mouth once daily.    DIVALPROEX SPRINKLE (DEPAKOTE SPRINKLE) 125 MG DR CAPSULE    Take 2 capsules (250 mg) by mouth 2 times a day.    LORAZEPAM (ATIVAN) 0.5 MG TABLET    Take 1 tablet (0.5 mg) by mouth 2 times a day.    METOPROLOL SUCCINATE XL (TOPROL-XL) 50 MG 24 HR TABLET    Take 1 tablet (50 mg) by mouth once daily.    MILK OF MAGNESIA 400 MG/5 ML SUSPENSION    Take 30 mL by mouth once daily as needed for constipation.    SODIUM PHOSPHATES (ENEMA) 19-7 GRAM/118 ML ENEMA ENEMA    Insert 133 mL (1 enema) into the rectum once daily as needed for constipation.    VENLAFAXINE (EFFEXOR) 50 MG TABLET    Take 1 tablet (50 mg) by mouth once daily.    WARFARIN (COUMADIN) 3 MG TABLET    Take 2.5 tablets (7.5 mg) by mouth once daily at bedtime. Take as directed per After Visit Summary.  Adjust as needed to maintain INR 2-3    WARFARIN (COUMADIN) 4 MG TABLET    Take 1 tablet (4 mg) by mouth once daily at bedtime.       ALLERGIES     Patient has no known allergies.    FAMILY HISTORY       Family History   Problem Relation Name Age of Onset    Hypertension Mother Vida Brown     Stroke Mother Vida [facility]     Heart disease Father [facility]     Cancer Sister Ashley Brownlee         SOCIAL HISTORY       Social History     Socioeconomic History    Marital status: Single   Tobacco Use    Smoking status: Former     Current packs/day: 1.00     Types: Cigarettes     Passive exposure: Past    Smokeless tobacco: Never   Vaping Use    Vaping status: Never Used   Substance and Sexual Activity    Alcohol use: Not Currently    Drug use: Never    Sexual activity: Not Currently     Partners: Female     Social Drivers of Health     Financial Resource Strain: Low Risk  (10/29/2024)    Overall Financial Resource Strain (CARDIA)      Difficulty of Paying Living Expenses: Not hard at all   Food Insecurity: Patient Unable To Answer (10/29/2024)    Hunger Vital Sign     Worried About Running Out of Food in the Last Year: Patient unable to answer     Ran Out of Food in the Last Year: Patient unable to answer   Transportation Needs: No Transportation Needs (10/29/2024)    PRAPARE - Transportation     Lack of Transportation (Medical): No     Lack of Transportation (Non-Medical): No   Physical Activity: Inactive (10/29/2024)    Exercise Vital Sign     Days of Exercise per Week: 0 days     Minutes of Exercise per Session: 0 min   Stress: Patient Unable To Answer (10/29/2024)    [employer] [place] [employer] - Occupational Stress Questionnaire     Feeling of Stress : Patient unable to answer   Social Connections: Patient Unable To Answer (10/29/2024)    Social Connection and Isolation Panel [NHANES]     Frequency of Communication with Friends and Family: Patient unable to answer     Frequency of Social Gatherings with Friends and Family: Patient unable to answer     Attends [employer] Services: Patient unable to answer     Active Member of Clubs or Organizations: Patient unable to answer     Attends Club or Organization Meetings: Patient unable to answer     Marital Status: Patient unable to answer   Intimate Partner Violence: Patient Unable To Answer (10/29/2024)    Humiliation, Afraid, Rape, and Kick questionnaire     Fear of Current or Ex-Partner: Patient unable to answer     Emotionally Abused: Patient unable to answer     Physically Abused: Patient unable to answer     Sexually Abused: Patient unable to answer   Housing Stability: Low Risk  (10/29/2024)    Housing Stability Vital Sign     Unable to Pay for Housing in the Last Year: No     Number of Times Moved in the Last Year: 0     Homeless in the Last Year: No       PHYSICAL EXAM       ED Triage Vitals   Temperature Heart Rate Respirations BP   12/08/24 1949 12/08/24 1949 12/08/24 1949  12/08/24 2027   37 °C (98.6 °F) (!) 110 20 109/60      Pulse Ox Temp Source Heart Rate Source Patient Position   12/08/24 2027 12/08/24 1949 12/08/24 2027 12/08/24 2027   98 % Temporal Monitor Lying      BP Location FiO2 (%)     12/08/24 2027 --     Right arm        Physical Exam  Vitals and nursing note reviewed.   Constitutional:       General: He is not in acute distress.     Appearance: He is well-developed.   HENT:      Head: Normocephalic and atraumatic.   Cardiovascular:      Rate and Rhythm: Normal rate and regular rhythm.      Heart sounds: No murmur heard.  Pulmonary:      Effort: Pulmonary effort is normal. No respiratory distress.      Breath sounds: Normal breath sounds.   Abdominal:      General: There is distension (Suprapubic).      Palpations: Abdomen is soft.      Tenderness: There is abdominal tenderness.      Comments: Suprapubic tenderness,   Genitourinary:     Comments: Urethral tear inferior approximately 5 mm slow bleeding  Skin:     General: Skin is warm and dry.      Capillary Refill: Capillary refill takes less than 2 seconds.   Neurological:      Mental Status: He is alert. Mental status is at baseline.      Motor: No weakness.      Comments: Moving all extremities, pushing staff away          DIAGNOSTIC RESULTS     LABS:  Labs Reviewed - No data to display    All other labs were within normal range or not returned as of this dictation.    Imaging  No orders to display        Procedures  Procedures     EMERGENCY DEPARTMENT COURSE/MDM:   Medical Decision Making  Slim Saldivar is an 89 y.o. male with history including dementia, CKD stage III, atrial fibrillation no longer on thinners, hyperlipidemia presenting to the emergency department for hematuria and urinary retention.  Patient is aggressive on initial evaluation.  Bedside bladder scan showed greater than 700 cc of urine.  Three-way Calhoun was placed and urine was dark and concentrated but no evidence of blood.  Because there was no  acute evidence of blood in urine decided not to irrigate the Calhoun.  Urinalysis sent for evaluation.    Urine came back positive for UTI.  Attending spoke with family.  He is a hospice patient.  After attending spoke with family they would like patient to be treated for UTI including labs and IV antibiotics.  Discussed with patient that he may still get worse even with the infection.  He does not feel comfortable having this patient be sent home and not being treated aggressively for the infection.  Patient started on Rocephin lab workup ordered.    Lab workup reviewed interpreted independently.  Patient has elevated white count 11.6 consistent with acute infectious process.  Renal function at baseline.  No other concerning findings on workup.  Troponin is slightly elevated at 25 which is similar to his baseline.  Patient's heart rate improved with a liter of lactated ringer.  Patient will be admitted to medicine for continued management and treatment of UTI with IV antibiotics.        ED Course as of 12/09/24 0350   Sun Dec 08, 2024   2130 800 cc of hematuria returned. [TL]   2142 Signs of infection in the urine noted. [TL]   2146 Attempted to call patient's nursing facility of [facility] however no answer was received. [TL]   2204 Spoke to the patient's son  Herbert who states that he would not be comfortable with his father being returned back to the nursing facility at this time given persistent tachycardia.  Patient is reportedly DNR comfort care and enrolled in hospice.  Patient's son states that he is concerned that they would not give oral antibiotics and he is not comfortable with the idea of him not being treated aggressively for his infection.  Did wish to pursue sepsis workup at this time and hospitalization if indicated. [TL]   2302 EKG performed at 22: 35 and independently reviewed by provider: Reveals atrial fibrillation with rapid ventricular response with a rate of 111  bpm, normal axis, normal intervals, no ST changes, no T wave abnormalities, no ectopy. No STEMI. [TL]   2302 I suspect A-fib is compensatory from underlying infection.  Will hold on any rate limiting agents at this time. [TL]   2303 Leukocytosis uptrending from prior noted.  Consistent with infection. [TL]      ED Course User Index  [TL] Jose Flynn DO         Diagnoses as of 12/09/24 0350   Urinary tract infection associated with indwelling urethral catheter, initial encounter (CMS-HCC)        External records reviewed: recent inpatient, clinic, and prior ED notes  Labs and Diagnostic imaging independently reviewed/interpreted by me.    Patient plan, care, lab results and imaging were all discussed with attending.    ED Medications administered this visit:    Medications   lidocaine 2 % mucosal jelly (Uro-Jet) 1 Application (has no administration in time range)     New Prescriptions from this visit:    New Prescriptions    No medications on file       (Please note that portions of this note were completed with a voice recognition program.  Efforts were made to edit the dictations but occasionally words are mis-transcribed.)     Adrianna Simon DO  Resident  12/09/24 0456       Adrianna Simon DO  Resident  12/10/24 1592

## 2024-12-09 NOTE — PROGRESS NOTES
SNF PROGRESS NOTE      Cc-  dementia/ fall       Patient is a Jim Dhillon 89 y.o. male who is being seen on the dementia unit for dementia. He is being seen for his 30 day exam.     Patient is being seen after a fall. Cathetar was placed in. He returned from the hospital without issues.         Past Medical History:   Diagnosis Date    Atrial fibrillation (HCC)     BPH (benign prostatic hyperplasia)     Chronic kidney disease     Dementia (HCC)     Hyperlipidemia      Patient has no allergy information on record.    VS reviewed      Gen- Alert and oriented x 1   Heart- RRR no murmur no LE edema   Lungs- CTA b/l no resp distress RA oxygen   Abd- bs x 4      + clarke        Assessment and Plan    Dementia   Ativan   Psych   Depakote   Effexor   A fib   Coumadin-- monitor INR   Metoprolol   HLD   Urinary retention   F/u urology 12/23  Flomax   Clarke      Significant Events      11/10/24- Fall and went to the ER       YAIR Jeronimo DO     Electronically signed by: Pearl Camacho DO on 11/27/2024

## 2024-12-09 NOTE — H&P
History Of Present Illness  Slim Saldivar is a 89 y.o. male presenting with reports of confusion and urinary retention.  Patient reportedly removed his Calhoun catheter 2 days prior to assessment with the Calhoun balloon still fully inflated.  Calhoun catheter was replaced and there was noted to be hematuria with the passage of blood clots.  Upon arrival to this facility, Calhoun catheter was again replaced and red-tinged urine was noted as output.  Of note, patient is a documented DNAR within our system.  Patient's son per ED report had noted he was DNR CC however attempts to contact his son myself not been successful.  Regardless, patient's POA/son was comfortable with the idea of hospital admission and treatment of the underlying infection and was interested in discussing additional placement options as he has been reportedly unsatisfied with the care being provided.     Past Medical History  Past Medical History:   Diagnosis Date    Anemia     Chronic kidney disease     Varicella        Surgical History  Past Surgical History:   Procedure Laterality Date    CIRCUMCISION, PRIMARY      OTHER SURGICAL HISTORY  01/20/2022    Tonsillectomy    TONSILLECTOMY      VASECTOMY      WISDOM TOOTH EXTRACTION          Social History  He reports that he has quit smoking. His smoking use included cigarettes. He has been exposed to tobacco smoke. He has never used smokeless tobacco. He reports that he does not currently use alcohol. He reports that he does not use drugs.    Family History  Family History   Problem Relation Name Age of Onset    Hypertension Mother Vida Saldivar     Stroke Mother Vida Saldivar     Heart disease Father Bird Saldivar     Cancer Sister Ashley Brownlee         Allergies  Patient has no known allergies.    Review of Systems   Unable to perform ROS: Mental status change        Physical Exam  Vitals reviewed.   Constitutional:       Appearance: Normal appearance. He is ill-appearing.   HENT:      Head: Normocephalic and  "atraumatic.      Nose: Nose normal.      Mouth/Throat:      Mouth: Mucous membranes are moist.   Eyes:      Extraocular Movements: Extraocular movements intact.      Conjunctiva/sclera: Conjunctivae normal.      Pupils: Pupils are equal, round, and reactive to light.   Cardiovascular:      Rate and Rhythm: Normal rate and regular rhythm.      Pulses: Normal pulses.      Heart sounds: Normal heart sounds.   Pulmonary:      Effort: Pulmonary effort is normal.      Breath sounds: Normal breath sounds.   Abdominal:      General: Bowel sounds are normal.      Palpations: Abdomen is soft.   Musculoskeletal:         General: Normal range of motion.      Cervical back: Normal range of motion and neck supple.   Skin:     General: Skin is warm and dry.   Neurological:      General: No focal deficit present.      Mental Status: He is alert. Mental status is at baseline.   Psychiatric:         Mood and Affect: Mood normal.         Behavior: Behavior normal.          Last Recorded Vitals  Blood pressure 108/62, pulse 87, temperature 37.3 °C (99.1 °F), temperature source Temporal, resp. rate 18, height 1.803 m (5' 11\"), weight 68 kg (150 lb), SpO2 98%.    Relevant Results  Scheduled medications  cefTRIAXone, 1 g, intravenous, q24h  pantoprazole, 40 mg, oral, Daily before breakfast   Or  pantoprazole, 40 mg, intravenous, Daily before breakfast      Continuous medications     PRN medications  PRN medications: acetaminophen **OR** acetaminophen **OR** acetaminophen, acetaminophen **OR** acetaminophen **OR** acetaminophen, melatonin, ondansetron ODT **OR** ondansetron  XR chest 1 view    Result Date: 12/8/2024  Interpreted By:  Jake Wagner, STUDY: XR CHEST 1 VIEW;  12/8/2024 10:24 pm   INDICATION: Signs/Symptoms:tachycardia, UTI.   COMPARISON: Chest radiograph 11/26/2024.   ACCESSION NUMBER(S): [id number]   ORDERING CLINICIAN: GISEL TORIBIO   FINDINGS:   CARDIOMEDIASTINAL SILHOUETTE: Cardiomediastinal silhouette is mildly " enlarged and stable.   LUNGS/PLEURA: There are no consolidations.There are no pleural effusions. There is no demonstrated pneumothorax.     BONES: No evidence of acute osseous abnormality.       1.  No evidence of acute cardiopulmonary process. Cardiomegaly, similar to prior.     Signed by: Jake Wagner 12/8/2024 11:12 PM Dictation workstation:   [id number]    ECG 12 lead    Result Date: 11/30/2024  Atrial fibrillation with rapid ventricular response Low voltage QRS Nonspecific ST and T wave abnormality , probably digitalis effect Abnormal ECG When compared with ECG of 28-OCT-2024 21:51, Nonspecific T wave abnormality has replaced inverted T waves in Inferior leads Nonspecific T wave abnormality, worse in Anterior leads Confirmed by Warren Jauregui ([id number]) on 11/30/2024 10:56:52 PM    XR chest 1 view    Result Date: 11/26/2024  Interpreted By:  Brendan Fonseca, STUDY: XR CHEST 1 VIEW;  11/26/2024 2:50 pm   INDICATION: Signs/Symptoms:fall.     COMPARISON: None.   ACCESSION NUMBER(S): [id number]   ORDERING CLINICIAN: MERRICK BLUE   FINDINGS:         CARDIOMEDIASTINAL SILHOUETTE: Cardiomediastinal silhouette is moderately enlarged.   LUNGS: No consolidation or effusion seen. Multiple lines over the right hemithorax compatible with skin folds or outside material. No definite pneumothorax seen.   ABDOMEN: No remarkable upper abdominal findings.   BONES: No acute osseous changes.       Moderate enlargement of cardiac silhouette. No consolidation or effusion seen     MACRO: None   Signed by: Brendan Fonseca 11/26/2024 2:55 PM Dictation workstation:   [id number]    CT cervical spine wo IV contrast    Result Date: 11/26/2024  Interpreted By:  Socrates Montejo, STUDY: CT CERVICAL SPINE WO IV CONTRAST; 11/26/2024 1:47 pm   INDICATION: Signs/Symptoms:fall.   COMPARISON: None.   ACCESSION NUMBER(S): [id number]   ORDERING CLINICIAN: MERRICK BLUE   TECHNIQUE: Contiguous axial CT images were obtained at  2 mm slice thickness  through the cervical spine without contrast administration. The images were then reconstructed in the coronal and sagittal planes.   FINDINGS: There is no CT evidence of acute fracture identified. No prevertebral soft tissue swelling is identified.   ALIGNMENT: The vertebral bodies are well aligned without evidence of subluxation.   VERTEBRAE/DISC SPACES: Moderate to severe disc space narrowing and marginal osteophyte formation is seen at the C5-6 level. Moderate disc space narrowing and bridging osteophyte formation is seen at the C6-7 level. Mild-to-moderate disc space narrowing and small marginal osteophytes are present at the C3-4 and C4-5 levels. Moderate to severe facet degenerative changes are seen throughout the cervical spine on the left.   ADDITIONAL FINDINGS: Evaluation of the visualized soft tissues of the neck is limited by the lack of intravenous contrast.  Within this limitation, no gross mass or lymphadenopathy is identified. Dense atherosclerotic calcifications are seen in the carotid bulbs bilaterally.       1.  No CT evidence of acute fracture. 2.  Degenerative changes throughout the cervical spine, as above.   Signed by: Socrates Montejo 11/26/2024 2:08 PM Dictation workstation:   [id number]    CT head wo IV contrast    Result Date: 11/26/2024  Interpreted By:  Socrates Montejo, STUDY: CT HEAD WO IV CONTRAST; 11/26/2024 1:47 pm   INDICATION: Signs/Symptoms:fall on eliquis.   COMPARISON: CT head dated 11/10/2024   ACCESSION NUMBER(S): [id number]   ORDERING CLINICIAN: MERRICK BLUE   TECHNIQUE: Contiguous axial CT images were obtained through the head at 5 mm slice thickness without contrast administration.   FINDINGS: INTRACRANIAL: Moderate diffuse volume loss is seen bilaterally. Mild periventricular white matter changes are seen.  The grey-white differentiation is intact. There is no mass effect or midline shift. There is no extraaxial fluid collection. There is no intracranial hemorrhage.  The  calvarium  is unremarkable.   EXTRACRANIAL: Visualized paranasal sinuses and mastoids are clear.       1. Diffuse volume loss and periventricular white matter changes, most consistent with small vessel ischemic disease. 2. No evidence of acute cortical infarct or intracranial hemorrhage.     MACRO: None   Signed by: Socrates Montejo 11/26/2024 2:06 PM Dictation workstation:   [id number]    CT head wo IV contrast    Result Date: 11/10/2024  Interpreted By:  Chris Gaytan, STUDY: CT HEAD WO IV CONTRAST; CT CERVICAL SPINE WO IV CONTRAST; ; 11/10/2024 9:44 pm   INDICATION: Signs/Symptoms:fall, head injury, on blood thinner; Signs/Symptoms:fall, head injury, on anticoagulation.     COMPARISON: Noncontrast CT exams of head and cervical spine of 10/28/2024.   ACCESSION NUMBER(S): [id number]; [id number]   ORDERING CLINICIAN: MALENA HUNTER   TECHNIQUE: Noncontrast CT exams of the head and cervical spine with multiplanar reformations.   FINDINGS: BRAIN PARENCHYMA: Advanced volume loss.  There is periventricular and subcortical white matter hypoattenuation, most in keeping with chronic microvascular ischemic change. Gray-white matter interfaces are preserved. No mass, mass effect or midline shift.   HEMORRHAGE: No acute intracranial hemorrhage. VENTRICLES and EXTRA-AXIAL SPACES: Normal size. EXTRACRANIAL SOFT TISSUES: Within normal limits. PARANASAL SINUSES/MASTOIDS: The visualized paranasal sinuses and mastoid air cells are aerated. CALVARIUM: No depressed skull fracture. No destructive osseous lesion.   OTHER FINDINGS: None.   CERVICAL SPINE:   Straightening of normal cervical lordosis could reflect positioning or muscle spasm. ALIGNMENT: Grade 1 degenerative anterolisthesis at C7-T1. Alignment is otherwise normal. VERTEBRAE: No acute fracture. SPINAL CANAL: No critical spinal canal stenosis. PREVERTEBRAL SOFT TISSUES: No prevertebral soft tissue swelling. LUNG APICES: Imaged portion of the lung apices are within normal  limits.   OTHER FINDINGS: None.       No evidence of acute intracranial abnormality.   No acute cervical spine fracture or malalignment.     MACRO: None   Signed by: Chris Gaytan 11/10/2024 10:52 PM Dictation workstation:   [id number]    CT cervical spine wo IV contrast    Result Date: 11/10/2024  Interpreted By:  Chris Gayatn, STUDY: CT HEAD WO IV CONTRAST; CT CERVICAL SPINE WO IV CONTRAST; ; 11/10/2024 9:44 pm   INDICATION: Signs/Symptoms:fall, head injury, on blood thinner; Signs/Symptoms:fall, head injury, on anticoagulation.     COMPARISON: Noncontrast CT exams of head and cervical spine of 10/28/2024.   ACCESSION NUMBER(S): [id number]; [id number]   ORDERING CLINICIAN: MALENA HUNTER   TECHNIQUE: Noncontrast CT exams of the head and cervical spine with multiplanar reformations.   FINDINGS: BRAIN PARENCHYMA: Advanced volume loss.  There is periventricular and subcortical white matter hypoattenuation, most in keeping with chronic microvascular ischemic change. Gray-white matter interfaces are preserved. No mass, mass effect or midline shift.   HEMORRHAGE: No acute intracranial hemorrhage. VENTRICLES and EXTRA-AXIAL SPACES: Normal size. EXTRACRANIAL SOFT TISSUES: Within normal limits. PARANASAL SINUSES/MASTOIDS: The visualized paranasal sinuses and mastoid air cells are aerated. CALVARIUM: No depressed skull fracture. No destructive osseous lesion.   OTHER FINDINGS: None.   CERVICAL SPINE:   Straightening of normal cervical lordosis could reflect positioning or muscle spasm. ALIGNMENT: Grade 1 degenerative anterolisthesis at C7-T1. Alignment is otherwise normal. VERTEBRAE: No acute fracture. SPINAL CANAL: No critical spinal canal stenosis. PREVERTEBRAL SOFT TISSUES: No prevertebral soft tissue swelling. LUNG APICES: Imaged portion of the lung apices are within normal limits.   OTHER FINDINGS: None.       No evidence of acute intracranial abnormality.   No acute cervical spine fracture or malalignment.      MACRO: None   Signed by: Chris Gaytan 11/10/2024 10:52 PM Dictation workstation:   [id number]   Results for orders placed or performed during the hospital encounter of 12/08/24 (from the past 24 hours)   Urinalysis with Reflex Culture and Microscopic   Result Value Ref Range    Color, Urine Light-Orange (N) Light-Yellow, Yellow, Dark-Yellow    Appearance, Urine Turbid (N) Clear    Specific Gravity, Urine 1.016 1.005 - 1.035    pH, Urine 6.0 5.0, 5.5, 6.0, 6.5, 7.0, 7.5, 8.0    Protein, Urine 30 (1+) (A) NEGATIVE, 10 (TRACE), 20 (TRACE) mg/dL    Glucose, Urine Normal Normal mg/dL    Blood, Urine OVER (3+) (A) NEGATIVE    Ketones, Urine 10 (1+) (A) NEGATIVE mg/dL    Bilirubin, Urine NEGATIVE NEGATIVE    Urobilinogen, Urine Normal Normal mg/dL    Nitrite, Urine 2+ (A) NEGATIVE    Leukocyte Esterase, Urine 500 Dain/µL (A) NEGATIVE   Extra Urine Gray Tube   Result Value Ref Range    Extra Tube Hold for add-ons.    Microscopic Only, Urine   Result Value Ref Range    WBC, Urine >50 (A) 1-5, NONE /HPF    RBC, Urine >20 (A) NONE, 1-2, 3-5 /HPF    Bacteria, Urine 1+ (A) NONE SEEN /HPF   Renal function panel   Result Value Ref Range    Glucose 98 74 - 99 mg/dL    Sodium 139 136 - 145 mmol/L    Potassium 4.6 3.5 - 5.3 mmol/L    Chloride 104 98 - 107 mmol/L    Bicarbonate 27 21 - 32 mmol/L    Anion Gap 13 10 - 20 mmol/L    Urea Nitrogen 25 (H) 6 - 23 mg/dL    Creatinine 1.18 0.50 - 1.30 mg/dL    eGFR 59 (L) >60 mL/min/1.73m*2    Calcium 8.9 8.6 - 10.3 mg/dL    Phosphorus 3.3 2.5 - 4.9 mg/dL    Albumin 3.2 (L) 3.4 - 5.0 g/dL   Hepatic function panel   Result Value Ref Range    Albumin 3.2 (L) 3.4 - 5.0 g/dL    Bilirubin, Total 1.2 0.0 - 1.2 mg/dL    Bilirubin, Direct 0.2 0.0 - 0.3 mg/dL    Alkaline Phosphatase 103 33 - 136 U/L    ALT 13 10 - 52 U/L    AST 21 9 - 39 U/L    Total Protein 6.4 6.4 - 8.2 g/dL   CBC and Auto Differential   Result Value Ref Range    WBC 11.6 (H) 4.4 - 11.3 x10*3/uL    nRBC 0.0 0.0 - 0.0 /100 WBCs     RBC 3.55 (L) 4.50 - 5.90 x10*6/uL    Hemoglobin 11.0 (L) 13.5 - 17.5 g/dL    Hematocrit 35.4 (L) 41.0 - 52.0 %     80 - 100 fL    MCH 31.0 26.0 - 34.0 pg    MCHC 31.1 (L) 32.0 - 36.0 g/dL    RDW 16.3 (H) 11.5 - 14.5 %    Platelets 291 150 - 450 x10*3/uL    Neutrophils % 85.9 40.0 - 80.0 %    Immature Granulocytes %, Automated 0.4 0.0 - 0.9 %    Lymphocytes % 6.8 13.0 - 44.0 %    Monocytes % 6.5 2.0 - 10.0 %    Eosinophils % 0.1 0.0 - 6.0 %    Basophils % 0.3 0.0 - 2.0 %    Neutrophils Absolute 9.98 (H) 1.60 - 5.50 x10*3/uL    Immature Granulocytes Absolute, Automated 0.05 0.00 - 0.50 x10*3/uL    Lymphocytes Absolute 0.79 (L) 0.80 - 3.00 x10*3/uL    Monocytes Absolute 0.75 0.05 - 0.80 x10*3/uL    Eosinophils Absolute 0.01 0.00 - 0.40 x10*3/uL    Basophils Absolute 0.04 0.00 - 0.10 x10*3/uL   Lactate   Result Value Ref Range    Lactate 2.0 0.4 - 2.0 mmol/L   Troponin I, High Sensitivity   Result Value Ref Range    Troponin I, High Sensitivity 25 (H) 0 - 20 ng/L   Protime-INR   Result Value Ref Range    Protime 11.9 9.8 - 12.8 seconds    INR 1.1 0.9 - 1.1   PST Top   Result Value Ref Range    Extra Tube Hold for add-ons.           Assessment/Plan   Assessment & Plan  UTI (urinary tract infection)      Patient presents from his nursing facility due to increased confusion and recent self removal of his Calhoun catheter with associated urethral trauma.  He was noted to have significant hematuria which has lessened.  CODE STATUS per ED report is DNR CC however this has not been confirmed with patient's POA and patient does not have capacity at this time to make his own medical decisions given his confusion.    -To be admitted to regular medical floors without telemetry.  Calhoun catheter to be maintained and I's and O's to be monitored.  Should hematuria worsen or signs of outflow obstruction present, would consider urology consultation at that time and initiation of three-way Calhoun catheter with continuous bladder  irrigation.    -Continue to treat with IV Rocephin.  Urine cultures currently pending    -During patient's previous hospital admission, memory care versus SNF was discussed significantly.  Per report, patient's family is unhappy with the care being provided at his current facility.  Appreciate the assistance of case management/social work and helping to facilitate an appropriate discharge plan.    Diet: Regular  Fluids: N/A  DVT prophylaxis: SCDs  Telemetry: Not Indicated    Home Medications: Pending    I spent >75 minutes in the professional and overall care of this patient.      Suraj Gordon, DO

## 2024-12-09 NOTE — PROGRESS NOTES
12/09/24 1116   Discharge Planning   Living Arrangements Other (Comment)   Support Systems Children   Type of Residence Assisted living   Care Facility Name [facility]   Home or Post Acute Services Other (Comment)   Type of Post Acute Facility Services Assisted living   Expected Discharge Disposition Home   Does the patient need discharge transport arranged? Yes   RoundTrip coordination needed? Yes     Pt medically ready for discharge today. Returning to [facility]. Nursing is arranging transport.

## 2024-12-09 NOTE — DISCHARGE SUMMARY
Discharge Diagnosis  UTI (urinary tract infection)    Issues Requiring Follow-Up    #?Acute cystitis  #Accidental removal of Calhoun catheter with urethral trauma associated with hematuria  #Acute on chronic urinary retention   -Calhoun catheter placed 12/9, void trial per facility   -Continued ciprofloxacin 500 mg twice daily   -VSS, hbg stable with solution of hematuria    Discharge Meds     Medication List      CONTINUE taking these medications     * acetaminophen 650 mg suppository; Commonly known as: Tylenol   * acetaminophen 500 mg tablet; Commonly known as: Tylenol   * acetaminophen 325 mg tablet; Commonly known as: Tylenol   allopurinol 100 mg tablet; Commonly known as: Zyloprim; TAKE 1 TABLET   DAILY   alum-mag hydroxide-simeth 200-200-20 mg/5 mL oral suspension; Commonly   known as: Mylanta   Anti-DiarrheaL (loperamide) 2 mg tablet; Generic drug: loperamide   ciprofloxacin 500 mg tablet; Commonly known as: Cipro   divalproex sprinkle 125 mg DR capsule; Commonly known as: Depakote   Sprinkle   Enema 19-7 gram/118 mL enema enema; Generic drug: sodium phosphates   * LORazepam 0.5 mg tablet; Commonly known as: Ativan   * LORazepam 0.5 mg tablet; Commonly known as: Ativan   metoprolol succinate XL 50 mg 24 hr capsule; Commonly known as:   Kapspargo   Milk of Magnesia 400 mg/5 mL suspension; Generic drug: magnesium   hydroxide   tamsulosin 0.4 mg 24 hr capsule; Commonly known as: Flomax; Take 1   capsule (0.4 mg) by mouth once daily.   traZODone 50 mg tablet; Commonly known as: Desyrel   venlafaxine 50 mg tablet; Commonly known as: Effexor  * This list has 5 medication(s) that are the same as other medications   prescribed for you. Read the directions carefully, and ask your doctor or   other care provider to review them with you.     STOP taking these medications     warfarin 3 mg tablet; Commonly known as: Coumadin       Test Results Pending At Discharge  Pending Labs       Order Current Status    Urine Culture In  process    Blood Culture Preliminary result    Blood Culture Preliminary result            Hospital Course  89-year-old male presenting from SNF with complaint of increased confusion and recent self removal of Calhoun catheter associated with urethral trauma/hematuria.  Catheter reinserted with hematuria resolving this morning with good output recorded.  Initially treated with broad-spectrum antibiotics Rocephin urine cultures pending, medical stable for discharge back to facility urine cultures, will resume ciprofloxacin 5 mg daily void trial per facility's protocol.    Pertinent Physical Exam At Time of Discharge  Physical Exam  Vitals and nursing note reviewed.   Constitutional:       Appearance: He is not toxic-appearing.   HENT:      Head: Normocephalic and atraumatic.      Mouth/Throat:      Mouth: Mucous membranes are moist.      Pharynx: Oropharynx is clear.   Eyes:      Conjunctiva/sclera: Conjunctivae normal.      Pupils: Pupils are equal, round, and reactive to light.   Pulmonary:      Effort: Pulmonary effort is normal.   Abdominal:      General: Abdomen is flat.      Palpations: Abdomen is soft.   Musculoskeletal:      Cervical back: Neck supple.   Skin:     General: Skin is warm.      Capillary Refill: Capillary refill takes less than 2 seconds.   Neurological:      Mental Status: He is alert. Mental status is at baseline.      Comments: Demented at baseline   Psychiatric:         Mood and Affect: Mood normal.         Behavior: Behavior normal.         Outpatient Follow-Up  Future Appointments   Date Time Provider Department Center   12/23/2024  2:00 PM JOVON Patrick-CNP [id number] Hector Ceja, DO  PGY3, internal medicine [facility]

## 2024-12-09 NOTE — PROGRESS NOTES
Pharmacy Medication History Review    Slim Saldivar is a 89 y.o. male admitted for UTI (urinary tract infection). Pharmacy reviewed the patient's [id number] medications and allergies for accuracy.    The list below reflects the updated PTA list.   Prior to Admission Medications   Prescriptions Last Dose Informant   Anti-DiarrheaL, loperamide, 2 mg tablet Unknown Other   Sig: Take 1 tablet (2 mg) by mouth 3 times a day as needed for diarrhea.   LORazepam (Ativan) 0.5 mg tablet Unknown Other   Sig: Take 1 tablet (0.5 mg) by mouth 2 times a day.   LORazepam (Ativan) 0.5 mg tablet Unknown Other   Sig: Take 1 tablet (0.5 mg) by mouth every 4 hours if needed for anxiety.   Milk of Magnesia 400 mg/5 mL suspension Unknown Other   Sig: Take 30 mL by mouth once daily as needed for constipation.   acetaminophen (Tylenol) 325 mg tablet Unknown Other   Sig: Take 2 tablets (650 mg) by mouth every 4 hours if needed for mild pain (1 - 3).   acetaminophen (Tylenol) 500 mg tablet Unknown Other   Sig: Take 1 tablet (500 mg) by mouth every 6 hours if needed for mild pain (1 - 3) or fever (temp greater than 38.0 C).   acetaminophen (Tylenol) 650 mg suppository Unknown Other   Sig: Insert 1 suppository (650 mg) into the rectum every 4 hours if needed for mild pain (1 - 3) or fever (temp greater than 38.0 C).   allopurinol (Zyloprim) 100 mg tablet Unknown Other   Sig: TAKE 1 TABLET DAILY   alum-mag hydroxide-simeth (Mylanta) 200-200-20 mg/5 mL oral suspension Unknown Other   Sig: Take 30 mL by mouth every 4 hours if needed for indigestion or heartburn.   ciprofloxacin (Cipro) 500 mg tablet Unknown Other   Sig: Take 1 tablet (500 mg) by mouth 2 times a day.   divalproex sprinkle (Depakote Sprinkle) 125 mg DR capsule Unknown Other   Sig: Take 2 capsules (250 mg) by mouth 2 times a day.   metoprolol succinate XL ([place]) 50 mg 24 hr capsule Unknown Other   Sig: Take 1 capsule (50 mg) by mouth once daily.   sodium phosphates  "(Enema) 19-7 gram/118 mL enema enema Unknown Other   Sig: Insert 133 mL (1 enema) into the rectum once daily as needed for constipation.   tamsulosin (Flomax) 0.4 mg 24 hr capsule Unknown Other   Sig: Take 1 capsule (0.4 mg) by mouth once daily.   traZODone (Desyrel) 50 mg tablet Unknown Other   Sig: Take 1 tablet (50 mg) by mouth once daily at bedtime.   venlafaxine (Effexor) 50 mg tablet Unknown Other   Sig: Take 1 tablet (50 mg) by mouth once daily in the evening.   warfarin (Coumadin) 3 mg tablet NOT TAKING Other   Sig: Take 2.5 tablets (7.5 mg) by mouth once daily at bedtime. Take as directed per After Visit Summary.  Adjust as needed to maintain INR 2-3   Patient not taking: Reported on 11/26/2024      Facility-Administered Medications: None        The list below reflects the updated allergy list. Please review each documented allergy for additional clarification and justification.  Allergies  Reviewed by Toño Bradley, EMT on 12/8/2024   No Known Allergies         Patient was unable to be assessed for M2B at discharge.     Sources:   [facility] Order Summary Report printed at [facility] 12/8/24 18:47:34 ET, faxed from [facility] - uploaded to chart    Medications ADDED:  Cipro 500mg  Lorazepam 0.25mg (PRN)  Trazodone 50mg  Medications CHANGED:  Metoprolol 50mg - changed formulation from tablet to [place] sprinkle capsule  Venlafaxine - time of administration changed from Qday to Qevening  Medications REMOVED/MARKED NOT TAKING:   Warfarin 3mg  Warfarin 4mg  Vit D 50mcg     Additional Comments:  Ciprofloxacin duration of therapy: 7 days, scheduled end: 12/15/24  Has active orders for scheduled and PRN lorazepam    Sushila Aquino, PharmD  Transitions of Care Pharmacist  12/09/24     Secure Chat preferred   If no response call [phone] or Vocera \"Med Rec\"    "

## 2024-12-10 LAB
ATRIAL RATE: 113 BPM
Q ONSET: 225 MS
QRS COUNT: 19 BEATS
QRS DURATION: 68 MS
QT INTERVAL: 310 MS
QTC CALCULATION(BAZETT): 421 MS
QTC FREDERICIA: 380 MS
R AXIS: 58 DEGREES
T AXIS: 55 DEGREES
T OFFSET: 380 MS
VENTRICULAR RATE: 111 BPM

## 2024-12-11 LAB — BACTERIA UR CULT: ABNORMAL

## 2024-12-11 NOTE — SIGNIFICANT EVENT
Pharmacy Culture Review  Patient with blood cultures positive for S. Aureus from recent admission on 12/8. Discussed result with discharging provider, Dr. Alex Jerry, since patient was discharged with plans to pursue hospice/comfort care only will not recommend re-admitting pt for further aggressive treatment/workup as this is not consistent with goals of care.    Rochelle Salcedo, PharmD, BCPS, BCIDP  Clinical Pharmacy Specialist, Infectious Diseases  [phone]

## 2024-12-13 LAB
BACTERIA BLD AEROBE CULT: ABNORMAL
BACTERIA BLD CULT: ABNORMAL
GRAM STAIN: ABNORMAL
GRAM STN SPEC: ABNORMAL

## 2024-12-16 ENCOUNTER — PATIENT OUTREACH (OUTPATIENT)
Dept: CARE COORDINATION | Facility: CLINIC | Age: 89
End: 2024-12-16
Payer: MEDICARE

## 2024-12-16 NOTE — PROGRESS NOTES
First attempt made to reach patient for transitions of care outreach and no contact made with patient. Left voicemail with my name and contact information.     Patient discharged to SNF.

## 2024-12-23 ENCOUNTER — APPOINTMENT (OUTPATIENT)
Dept: UROLOGY | Facility: CLINIC | Age: 89
End: 2024-12-23
Payer: MEDICARE